# Patient Record
Sex: FEMALE | Race: WHITE | NOT HISPANIC OR LATINO | Employment: OTHER
[De-identification: names, ages, dates, MRNs, and addresses within clinical notes are randomized per-mention and may not be internally consistent; named-entity substitution may affect disease eponyms.]

---

## 2023-03-29 LAB
APPEARANCE, URINE: CLEAR
BILIRUBIN, URINE: NEGATIVE
BLOOD, URINE: NEGATIVE
COLOR, URINE: YELLOW
GLUCOSE, URINE: NEGATIVE MG/DL
KETONES, URINE: NEGATIVE MG/DL
LEUKOCYTE ESTERASE, URINE: ABNORMAL
MUCUS, URINE: NORMAL /LPF
NITRITE, URINE: NEGATIVE
PH, URINE: 5 (ref 5–8)
PROTEIN, URINE: NEGATIVE MG/DL
RBC, URINE: <1 /HPF (ref 0–5)
SPECIFIC GRAVITY, URINE: 1.01 (ref 1–1.03)
SQUAMOUS EPITHELIAL CELLS, URINE: 1 /HPF
TRANSITIONAL EPITHELIAL CELLS, URINE: <1 /HPF
UROBILINOGEN, URINE: <2 MG/DL (ref 0–1.9)
WBC, URINE: <1 /HPF (ref 0–5)

## 2023-03-31 LAB
URINE CULTURE: ABNORMAL
URINE CULTURE: ABNORMAL

## 2024-02-08 ENCOUNTER — TELEPHONE (OUTPATIENT)
Dept: SCHEDULING | Age: 74
End: 2024-02-08
Payer: MEDICARE

## 2024-02-08 ENCOUNTER — TELEPHONE (OUTPATIENT)
Dept: CARDIOLOGY | Facility: CLINIC | Age: 74
End: 2024-02-08
Payer: MEDICARE

## 2024-02-08 NOTE — TELEPHONE ENCOUNTER
Left a message on patient identified voice mail instructing her to seek medical attention as soon as possible for reported chest pain, tightness and nausea.  Suggested she may want to contact/follow up with MD who prescribed the nitroglycerin she took or prescriber of Crestor. Reiterated the urgency of seeking medical care for any type of chest pain. Encouraged her to call the office if she would like/needs a referral to general cardiology.

## 2024-02-09 ENCOUNTER — APPOINTMENT (OUTPATIENT)
Dept: CARDIOLOGY | Facility: HOSPITAL | Age: 74
End: 2024-02-09
Payer: MEDICARE

## 2024-02-09 ENCOUNTER — HOSPITAL ENCOUNTER (EMERGENCY)
Facility: HOSPITAL | Age: 74
Discharge: HOME | End: 2024-02-09
Attending: EMERGENCY MEDICINE
Payer: MEDICARE

## 2024-02-09 ENCOUNTER — APPOINTMENT (OUTPATIENT)
Dept: RADIOLOGY | Facility: HOSPITAL | Age: 74
End: 2024-02-09
Payer: MEDICARE

## 2024-02-09 VITALS
SYSTOLIC BLOOD PRESSURE: 157 MMHG | HEIGHT: 66 IN | WEIGHT: 153 LBS | HEART RATE: 68 BPM | RESPIRATION RATE: 20 BRPM | OXYGEN SATURATION: 98 % | TEMPERATURE: 98.3 F | BODY MASS INDEX: 24.59 KG/M2 | DIASTOLIC BLOOD PRESSURE: 74 MMHG

## 2024-02-09 DIAGNOSIS — R07.9 CHEST PAIN: Primary | ICD-10-CM

## 2024-02-09 DIAGNOSIS — R94.5 ABNORMAL LIVER FUNCTION: ICD-10-CM

## 2024-02-09 DIAGNOSIS — R07.9 CHEST PAIN, UNSPECIFIED TYPE: ICD-10-CM

## 2024-02-09 LAB
ALBUMIN SERPL BCP-MCNC: 4.3 G/DL (ref 3.4–5)
ALP SERPL-CCNC: 77 U/L (ref 33–136)
ALT SERPL W P-5'-P-CCNC: 192 U/L (ref 7–45)
ANION GAP SERPL CALC-SCNC: 12 MMOL/L (ref 10–20)
APAP SERPL-MCNC: <10 UG/ML
AST SERPL W P-5'-P-CCNC: 351 U/L (ref 9–39)
BASOPHILS # BLD AUTO: 0.04 X10*3/UL (ref 0–0.1)
BASOPHILS NFR BLD AUTO: 0.4 %
BILIRUB SERPL-MCNC: 0.9 MG/DL (ref 0–1.2)
BUN SERPL-MCNC: 14 MG/DL (ref 6–23)
CALCIUM SERPL-MCNC: 9.6 MG/DL (ref 8.6–10.3)
CARDIAC TROPONIN I PNL SERPL HS: 5 NG/L (ref 0–13)
CARDIAC TROPONIN I PNL SERPL HS: 5 NG/L (ref 0–13)
CHLORIDE SERPL-SCNC: 106 MMOL/L (ref 98–107)
CO2 SERPL-SCNC: 27 MMOL/L (ref 21–32)
CREAT SERPL-MCNC: 0.8 MG/DL (ref 0.5–1.05)
EGFRCR SERPLBLD CKD-EPI 2021: 77 ML/MIN/1.73M*2
EOSINOPHIL # BLD AUTO: 0.06 X10*3/UL (ref 0–0.4)
EOSINOPHIL NFR BLD AUTO: 0.5 %
ERYTHROCYTE [DISTWIDTH] IN BLOOD BY AUTOMATED COUNT: 13.5 % (ref 11.5–14.5)
FLUAV RNA RESP QL NAA+PROBE: NOT DETECTED
FLUBV RNA RESP QL NAA+PROBE: NOT DETECTED
GLUCOSE SERPL-MCNC: 99 MG/DL (ref 74–99)
HCT VFR BLD AUTO: 42.7 % (ref 36–46)
HGB BLD-MCNC: 14.5 G/DL (ref 12–16)
IMM GRANULOCYTES # BLD AUTO: 0.03 X10*3/UL (ref 0–0.5)
IMM GRANULOCYTES NFR BLD AUTO: 0.3 % (ref 0–0.9)
LIPASE SERPL-CCNC: 23 U/L (ref 9–82)
LYMPHOCYTES # BLD AUTO: 2.2 X10*3/UL (ref 0.8–3)
LYMPHOCYTES NFR BLD AUTO: 20 %
MCH RBC QN AUTO: 30.6 PG (ref 26–34)
MCHC RBC AUTO-ENTMCNC: 34 G/DL (ref 32–36)
MCV RBC AUTO: 90 FL (ref 80–100)
MONOCYTES # BLD AUTO: 0.86 X10*3/UL (ref 0.05–0.8)
MONOCYTES NFR BLD AUTO: 7.8 %
NEUTROPHILS # BLD AUTO: 7.81 X10*3/UL (ref 1.6–5.5)
NEUTROPHILS NFR BLD AUTO: 71 %
NRBC BLD-RTO: 0 /100 WBCS (ref 0–0)
PLATELET # BLD AUTO: 238 X10*3/UL (ref 150–450)
POTASSIUM SERPL-SCNC: 4 MMOL/L (ref 3.5–5.3)
PROT SERPL-MCNC: 7.4 G/DL (ref 6.4–8.2)
RBC # BLD AUTO: 4.74 X10*6/UL (ref 4–5.2)
SARS-COV-2 RNA RESP QL NAA+PROBE: NOT DETECTED
SODIUM SERPL-SCNC: 141 MMOL/L (ref 136–145)
WBC # BLD AUTO: 11 X10*3/UL (ref 4.4–11.3)

## 2024-02-09 PROCEDURE — 2500000001 HC RX 250 WO HCPCS SELF ADMINISTERED DRUGS (ALT 637 FOR MEDICARE OP): Performed by: EMERGENCY MEDICINE

## 2024-02-09 PROCEDURE — 76705 ECHO EXAM OF ABDOMEN: CPT | Mod: FOREIGN READ | Performed by: RADIOLOGY

## 2024-02-09 PROCEDURE — 99285 EMERGENCY DEPT VISIT HI MDM: CPT | Mod: 25 | Performed by: EMERGENCY MEDICINE

## 2024-02-09 PROCEDURE — 80053 COMPREHEN METABOLIC PANEL: CPT | Performed by: EMERGENCY MEDICINE

## 2024-02-09 PROCEDURE — 36415 COLL VENOUS BLD VENIPUNCTURE: CPT | Performed by: EMERGENCY MEDICINE

## 2024-02-09 PROCEDURE — 87636 SARSCOV2 & INF A&B AMP PRB: CPT | Performed by: EMERGENCY MEDICINE

## 2024-02-09 PROCEDURE — 2500000004 HC RX 250 GENERAL PHARMACY W/ HCPCS (ALT 636 FOR OP/ED): Performed by: STUDENT IN AN ORGANIZED HEALTH CARE EDUCATION/TRAINING PROGRAM

## 2024-02-09 PROCEDURE — 85025 COMPLETE CBC W/AUTO DIFF WBC: CPT | Performed by: EMERGENCY MEDICINE

## 2024-02-09 PROCEDURE — 84484 ASSAY OF TROPONIN QUANT: CPT | Performed by: EMERGENCY MEDICINE

## 2024-02-09 PROCEDURE — 71045 X-RAY EXAM CHEST 1 VIEW: CPT

## 2024-02-09 PROCEDURE — 76705 ECHO EXAM OF ABDOMEN: CPT

## 2024-02-09 PROCEDURE — 99285 EMERGENCY DEPT VISIT HI MDM: CPT | Mod: 25

## 2024-02-09 PROCEDURE — 93005 ELECTROCARDIOGRAM TRACING: CPT

## 2024-02-09 PROCEDURE — 71045 X-RAY EXAM CHEST 1 VIEW: CPT | Mod: FOREIGN READ | Performed by: RADIOLOGY

## 2024-02-09 PROCEDURE — 80143 DRUG ASSAY ACETAMINOPHEN: CPT | Performed by: EMERGENCY MEDICINE

## 2024-02-09 PROCEDURE — 83690 ASSAY OF LIPASE: CPT | Performed by: EMERGENCY MEDICINE

## 2024-02-09 RX ORDER — GUAIFENESIN 600 MG/1
600 TABLET, EXTENDED RELEASE ORAL EVERY 12 HOURS PRN
Status: DISCONTINUED | OUTPATIENT
Start: 2024-02-09 | End: 2024-02-09 | Stop reason: HOSPADM

## 2024-02-09 RX ORDER — ONDANSETRON HYDROCHLORIDE 2 MG/ML
4 INJECTION, SOLUTION INTRAVENOUS EVERY 8 HOURS PRN
Status: DISCONTINUED | OUTPATIENT
Start: 2024-02-09 | End: 2024-02-09 | Stop reason: HOSPADM

## 2024-02-09 RX ORDER — NAPROXEN SODIUM 220 MG/1
324 TABLET, FILM COATED ORAL ONCE
Status: COMPLETED | OUTPATIENT
Start: 2024-02-09 | End: 2024-02-09

## 2024-02-09 RX ORDER — PANTOPRAZOLE SODIUM 40 MG/1
40 TABLET, DELAYED RELEASE ORAL
Status: DISCONTINUED | OUTPATIENT
Start: 2024-02-10 | End: 2024-02-09 | Stop reason: HOSPADM

## 2024-02-09 RX ORDER — ENOXAPARIN SODIUM 100 MG/ML
40 INJECTION SUBCUTANEOUS EVERY 24 HOURS
Status: DISCONTINUED | OUTPATIENT
Start: 2024-02-09 | End: 2024-02-09 | Stop reason: HOSPADM

## 2024-02-09 RX ORDER — BISACODYL 5 MG
10 TABLET, DELAYED RELEASE (ENTERIC COATED) ORAL DAILY PRN
Status: DISCONTINUED | OUTPATIENT
Start: 2024-02-09 | End: 2024-02-09 | Stop reason: HOSPADM

## 2024-02-09 RX ORDER — ONDANSETRON 4 MG/1
4 TABLET, FILM COATED ORAL EVERY 8 HOURS PRN
Status: DISCONTINUED | OUTPATIENT
Start: 2024-02-09 | End: 2024-02-09 | Stop reason: HOSPADM

## 2024-02-09 RX ORDER — PANTOPRAZOLE SODIUM 40 MG/10ML
40 INJECTION, POWDER, LYOPHILIZED, FOR SOLUTION INTRAVENOUS
Status: DISCONTINUED | OUTPATIENT
Start: 2024-02-10 | End: 2024-02-09 | Stop reason: HOSPADM

## 2024-02-09 RX ORDER — POLYETHYLENE GLYCOL 3350 17 G/17G
17 POWDER, FOR SOLUTION ORAL DAILY
Status: DISCONTINUED | OUTPATIENT
Start: 2024-02-09 | End: 2024-02-09 | Stop reason: HOSPADM

## 2024-02-09 RX ORDER — BISACODYL 10 MG/1
10 SUPPOSITORY RECTAL DAILY PRN
Status: DISCONTINUED | OUTPATIENT
Start: 2024-02-09 | End: 2024-02-09 | Stop reason: HOSPADM

## 2024-02-09 RX ORDER — TALC
3 POWDER (GRAM) TOPICAL DAILY
Status: DISCONTINUED | OUTPATIENT
Start: 2024-02-09 | End: 2024-02-09 | Stop reason: HOSPADM

## 2024-02-09 RX ADMIN — POLYETHYLENE GLYCOL 3350 17 G: 17 POWDER, FOR SOLUTION ORAL at 18:13

## 2024-02-09 RX ADMIN — ASPIRIN 81 MG CHEWABLE TABLET 324 MG: 81 TABLET CHEWABLE at 16:04

## 2024-02-09 ASSESSMENT — ENCOUNTER SYMPTOMS
CHILLS: 0
ABDOMINAL PAIN: 0
COUGH: 0
SORE THROAT: 0
FEVER: 0
HEMATURIA: 0
PALPITATIONS: 1
ARTHRALGIAS: 0
VOMITING: 0
WHEEZING: 0
SEIZURES: 0
EYE PAIN: 0
BACK PAIN: 0
CHEST TIGHTNESS: 1
COLOR CHANGE: 0
DYSURIA: 0
SHORTNESS OF BREATH: 0
NAUSEA: 1

## 2024-02-09 ASSESSMENT — COLUMBIA-SUICIDE SEVERITY RATING SCALE - C-SSRS
1. IN THE PAST MONTH, HAVE YOU WISHED YOU WERE DEAD OR WISHED YOU COULD GO TO SLEEP AND NOT WAKE UP?: NO
2. HAVE YOU ACTUALLY HAD ANY THOUGHTS OF KILLING YOURSELF?: NO
6. HAVE YOU EVER DONE ANYTHING, STARTED TO DO ANYTHING, OR PREPARED TO DO ANYTHING TO END YOUR LIFE?: NO

## 2024-02-09 ASSESSMENT — PAIN - FUNCTIONAL ASSESSMENT: PAIN_FUNCTIONAL_ASSESSMENT: 0-10

## 2024-02-09 ASSESSMENT — LIFESTYLE VARIABLES
HAVE PEOPLE ANNOYED YOU BY CRITICIZING YOUR DRINKING: NO
HAVE YOU EVER FELT YOU SHOULD CUT DOWN ON YOUR DRINKING: NO
EVER FELT BAD OR GUILTY ABOUT YOUR DRINKING: NO

## 2024-02-09 NOTE — PROGRESS NOTES
Emergency Medicine Transition of Care Note.    I received Tracy Santoyo in signout from Dr. Palomares.  Please see the previous ED provider note for all HPI, PE and MDM up to the time of signout at 4:30 PM. This is in addition to the primary record.    In brief Tracy Santoyo is an 73 y.o. female presenting for   Chief Complaint   Patient presents with    Chest Pain     At the time of signout we were awaiting: Admission to the hospital    ED Course as of 02/09/24 1839 Fri Feb 09, 2024   1726 ECG: Sinus rhythm, rate 59, , QTc 436.  No ST elevation/depression meeting STEMI criteria. [BR]   1826 Patient signed to me pending ultrasound of the right upper quadrant and hospitalization ultrasound negative for any pathology sent for mild dilatation of pancreatic duct.  Patient troponin negative x 2.  On my assessment the patient is alert and awake O x 3 she is pain-free she wants be discharged home.  I had a long discussion with the patient she had decision making was made I told that in the absence of recent cardiac stress test okay tachycardic catheterization the possibility of unstable angina/unstable plaque cannot be ruled out and she is at high risk for MI/sudden death but patient still does not be hospitalized and she does not leave A either.  So we did a shared decision-making currently she is pain-free EKG is unremarkable troponin x 2 is negative she will see her cardiologist this coming Tuesday Dr. Turner in the interim if she gets any worse and her pain returns she will return to the ED immediately for evaluation.  Again the patient is leaving the ED she does not be hospitalized despite my attempts to convince her. [MT]      ED Course User Index  [BR] Navid Palomares MD  [MT] Crystal Powell MD         Diagnoses as of 02/09/24 1839   Chest pain   Chest pain, unspecified type   Abnormal liver function       Medical Decision Making  73-year-old white female history of no CAD in the past takes nitro at home  today came in after he had an episode of chest pain at home relieved nitroglycerin.  Currently pain-free.  Had EKG which was unremarkable and 2 negative troponins.  Labs concerning for transaminitis ultrasound upper quadrant showed some dilatation of the pinky duct otherwise negative ultrasound.  Patient is pain-free.  Patient was been hospitalized discussed with hospitalist.  Patient on my assessment is pain-free wants to go home I had a long discussion.  She is absolutely refusing to be hospitalized and does not want to leave AMA either.  She is competent we had a shared decision-making she does understand that she is high risk for a massive MI and/or sudden death but she still wants to go home and see her cardiologist Dr. Turner as scheduled next week.  In the interim I have urged that if he changes his mind and her pain returns return to the ED immediately for evaluation.  Currently she is pain-free EKG unremarkable stable vital signs and clinical troponins.    Final diagnoses:   [R07.9] Chest pain   [R07.9] Chest pain, unspecified type   [R94.5] Abnormal liver function           Procedure  Procedures    Crystal Powell MD

## 2024-02-09 NOTE — ED PROVIDER NOTES
HPI   Chief Complaint   Patient presents with    Chest Pain       HPI  Tracy Snatoyo is a 73 y.o. female who presents for chest pain. States that her chest pain started at noon today, rated 8/10 pain that does not radiate anywhere. She did have associated nausea at the start of her chest pain but no episodes of vomiting. Has had this in the past, once roughly a year ago around this time of year and then more recently Thursday night 2/08/24 at 2:45 am. Today she took two of her nitroglycerine pills and the chest pain eased.  Notes that she has been experiencing increased dyspnea with exertion over the last several days.  Notes similar symptoms approximately 1 year ago for which she was evaluated at [facility], workup at that time was reportedly largely unremarkable.  Patient denies any history of ACS/AMI personally.  Denies any history of PE or DVT.  Denies any pain with deep inspiration.  Denies any lower extremity edema or pain.  Denies any other recent illness or injury.                      [place] Coma Scale Score: 15                  No current facility-administered medications on file prior to encounter.     No current outpatient medications on file prior to encounter.        No Known Allergies    Review of Systems   Constitutional:  Negative for chills and fever.   HENT:  Negative for ear pain and sore throat.    Eyes:  Negative for pain and visual disturbance.   Respiratory:  Positive for chest tightness. Negative for cough, shortness of breath and wheezing.    Cardiovascular:  Positive for chest pain and palpitations.   Gastrointestinal:  Positive for nausea. Negative for abdominal pain and vomiting.   Genitourinary:  Negative for dysuria and hematuria.   Musculoskeletal:  Negative for arthralgias and back pain.   Skin:  Negative for color change and rash.   Neurological:  Negative for seizures and syncope.   All other systems reviewed and are negative.      ROS: Complete 12 point review of  systems performed, otherwise negative except as noted in the history of present illness    PMH: Reviewed, documented below in note. Pertinents in HPI  PSH: Reviewed and documented below in note. Pertinents in HPI  SH: No tobacco alcohol or illicits   Fam: Reviewed, noncontributory to patients current complaint  MEDS: Reviewed and documented below in note. Pertinents in HPI  ALLERGIES: Reviewed and documented below in note.    Patient History   Past Medical History:   Diagnosis Date    Encounter for fitting and adjustment of other specified devices 08/23/2022    Fitting and adjustment of pessary    Encounter for fitting and adjustment of other specified devices 12/02/2019    Fitting and adjustment of pessary    Personal history of other diseases of the nervous system and sense organs 01/15/2019    History of trigeminal neuralgia    Presence of urogenital implants 09/06/2022    Vaginal pessary present     Past Surgical History:   Procedure Laterality Date    OTHER SURGICAL HISTORY  01/15/2019    Abdominoplasty    OTHER SURGICAL HISTORY  01/15/2019    Cholecystectomy    OTHER SURGICAL HISTORY  01/15/2019    Urethropexy    OTHER SURGICAL HISTORY  01/15/2019    Hysterectomy    OTHER SURGICAL HISTORY  01/15/2019    Liver surgery    OTHER SURGICAL HISTORY  01/15/2019    Sinus surgery    OTHER SURGICAL HISTORY  01/15/2019    Craniotomy    OTHER SURGICAL HISTORY  10/18/2022    Sacrospinous fixation of vaginal vault     No family history on file.  Social History     Tobacco Use    Smoking status: Not on file    Smokeless tobacco: Not on file   Substance Use Topics    Alcohol use: Not on file    Drug use: Not on file       Physical Exam   ED Triage Vitals   Temperature Heart Rate Respirations BP   02/09/24 1300 02/09/24 1300 02/09/24 1300 02/09/24 1300   36.8 °C (98.3 °F) 60 20 164/71      Pulse Ox Temp src Heart Rate Source Patient Position   02/09/24 1300 -- -- 02/09/24 1431   98 %   Sitting      BP Location FiO2 (%)      02/09/24 1431 --     Left arm        Physical Exam  Vitals and nursing note reviewed.   Constitutional:       General: She is not in acute distress.     Appearance: She is well-developed.   HENT:      Head: Normocephalic and atraumatic.   Eyes:      Conjunctiva/sclera: Conjunctivae normal.   Cardiovascular:      Rate and Rhythm: Normal rate and regular rhythm.      Pulses:           Radial pulses are 2+ on the right side and 2+ on the left side.      Heart sounds: No murmur heard.  Pulmonary:      Effort: Pulmonary effort is normal. No tachypnea, bradypnea or respiratory distress.      Breath sounds: Normal breath sounds. No decreased breath sounds, wheezing, rhonchi or rales.   Chest:      Chest wall: No tenderness.   Abdominal:      Palpations: Abdomen is soft.      Tenderness: There is no abdominal tenderness.   Musculoskeletal:         General: No swelling.      Cervical back: Neck supple.      Right lower leg: No edema.      Left lower leg: No edema.   Skin:     General: Skin is warm and dry.      Capillary Refill: Capillary refill takes less than 2 seconds.   Neurological:      Mental Status: She is alert and oriented to person, place, and time.      GCS: GCS eye subscore is 4. GCS verbal subscore is 5. GCS motor subscore is 6.      Cranial Nerves: Cranial nerves 2-12 are intact.      Sensory: Sensation is intact.      Motor: Motor function is intact.      Deep Tendon Reflexes:      Reflex Scores:       Patellar reflexes are 2+ on the right side and 2+ on the left side.  Psychiatric:         Mood and Affect: Mood normal.       Labs Reviewed - No data to display  Pain Management Panel           No data to display              No orders to display     ED Course & MDM   ED Course as of 02/09/24 1727   Fri Feb 09, 2024   1726 ECG: Sinus rhythm, rate 59, , QTc 436.  No ST elevation/depression meeting STEMI criteria. [BR]      ED Course User Index  [BR] Navid Palomares MD           Medical Decision  Making  Patient presenting with dull substernal chest pain that improved with nitroglycerin, patient currently pain-free at time of ED evaluation.  ECG showing no ischemic changes.  Patient has an otherwise benign cardiopulmonary, abdominal examination.  Demonstrating normal range vital signs at time of assessment, in no acute distress.  Given substernal chest pain radiating upwards with improvement with nitroglycerin, does raise concern for cardiac source of chest pain, will also assess for intrathoracic process such as pneumonia, pneumothorax, mass with chest x-ray, will obtain CMP, lipase to assess for hepatobiliary/epigastric pathology although patient does not have any abdominal tenderness and seems to localize most of this to her chest.    Patient's troponin unremarkable, chest x-ray clear, LFTs were mildly elevated, did order acetaminophen level which was undetectably low and on reassessment patient denies utilizing any acetaminophen containing products.  Is status post cholecystectomy.  Did order right upper quadrant ultrasound to assess for structural liver pathology as well.  Given patient's chest pain resolving with nitroglycerin and with her elevated heart score (5) plan to admit for further cardiac evaluation, patient signed out pending right upper quadrant ultrasound results, discussion with hospitalist.  Patient signed out in stable condition.        Procedure  Procedures          The diagnosis and plan of care was also discussed with the patient. All questions were answered. The patient was receptive and agreeable to the plan of care. The patient was instructed to return to the emergency department if any symptoms recurred, worsened, or if there were any additional concerns.    *Disclaimer: This note was dictated by speech recognition. Minor errors in transcription may be present. Please call with questions.    Aleksey Mcmahan, [facility]  Medical student on Emergency Medicine service       Navid NAVARRO  MD Jelani  02/09/24 9806

## 2024-02-12 ENCOUNTER — OFFICE VISIT (OUTPATIENT)
Dept: CARDIOLOGY | Facility: CLINIC | Age: 74
End: 2024-02-12
Payer: MEDICARE

## 2024-02-12 VITALS
BODY MASS INDEX: 25.07 KG/M2 | WEIGHT: 156 LBS | HEART RATE: 66 BPM | TEMPERATURE: 98 F | HEIGHT: 66 IN | DIASTOLIC BLOOD PRESSURE: 70 MMHG | SYSTOLIC BLOOD PRESSURE: 135 MMHG

## 2024-02-12 DIAGNOSIS — R07.9 CHEST PAIN, UNSPECIFIED TYPE: Primary | ICD-10-CM

## 2024-02-12 DIAGNOSIS — R93.1 AGATSTON CORONARY ARTERY CALCIUM SCORE BETWEEN 200 AND 399: ICD-10-CM

## 2024-02-12 PROBLEM — R07.89 OTHER CHEST PAIN: Status: ACTIVE | Noted: 2024-02-12

## 2024-02-12 PROBLEM — R07.89 OTHER CHEST PAIN: Status: RESOLVED | Noted: 2024-02-12 | Resolved: 2024-02-12

## 2024-02-12 LAB
ATRIAL RATE: 58 BPM
P AXIS: 54 DEGREES
PR INTERVAL: 168 MS
Q ONSET: 251 MS
QRS COUNT: 10 BEATS
QRS DURATION: 101 MS
QT INTERVAL: 440 MS
QTC CALCULATION(BAZETT): 436 MS
QTC FREDERICIA: 437 MS
R AXIS: 46 DEGREES
T AXIS: 42 DEGREES
T OFFSET: 471 MS
VENTRICULAR RATE: 59 BPM

## 2024-02-12 PROCEDURE — 1160F RVW MEDS BY RX/DR IN RCRD: CPT | Performed by: INTERNAL MEDICINE

## 2024-02-12 PROCEDURE — 99214 OFFICE O/P EST MOD 30 MIN: CPT | Performed by: INTERNAL MEDICINE

## 2024-02-12 PROCEDURE — 1036F TOBACCO NON-USER: CPT | Performed by: INTERNAL MEDICINE

## 2024-02-12 PROCEDURE — 1126F AMNT PAIN NOTED NONE PRSNT: CPT | Performed by: INTERNAL MEDICINE

## 2024-02-12 PROCEDURE — 1157F ADVNC CARE PLAN IN RCRD: CPT | Performed by: INTERNAL MEDICINE

## 2024-02-12 PROCEDURE — 1159F MED LIST DOCD IN RCRD: CPT | Performed by: INTERNAL MEDICINE

## 2024-02-12 RX ORDER — NITROGLYCERIN 0.3 MG/1
0.3 TABLET SUBLINGUAL
COMMUNITY
Start: 2023-03-13

## 2024-02-12 RX ORDER — MULTIVITAMIN
1 TABLET ORAL DAILY
COMMUNITY
Start: 2022-01-26

## 2024-02-12 RX ORDER — MAGNESIUM 250 MG
250 TABLET ORAL 2 TIMES DAILY
COMMUNITY

## 2024-02-12 RX ORDER — ROSUVASTATIN CALCIUM 5 MG/1
1 TABLET, COATED ORAL DAILY
COMMUNITY
End: 2024-02-12 | Stop reason: SINTOL

## 2024-02-12 RX ORDER — OMEGA-3-ACID ETHYL ESTERS 1 G/1
1 CAPSULE, LIQUID FILLED ORAL 2 TIMES DAILY
COMMUNITY

## 2024-02-12 RX ORDER — DIMETHICONE 13 MG/ML
1 LOTION TOPICAL DAILY
Status: ON HOLD | COMMUNITY
Start: 2022-07-27 | End: 2024-03-15 | Stop reason: ALTCHOICE

## 2024-02-12 RX ORDER — DILTIAZEM HYDROCHLORIDE 240 MG/1
240 CAPSULE, COATED, EXTENDED RELEASE ORAL
COMMUNITY
Start: 2023-05-24 | End: 2024-05-16 | Stop reason: SDUPTHER

## 2024-02-12 RX ORDER — NITROFURANTOIN MACROCRYSTALS 50 MG/1
50 CAPSULE ORAL
COMMUNITY
Start: 2022-08-23

## 2024-02-12 RX ORDER — ESTRADIOL 0.1 MG/G
CREAM VAGINAL
COMMUNITY
Start: 2019-11-27

## 2024-02-12 RX ORDER — UBIDECARENONE 75 MG
CAPSULE ORAL
COMMUNITY
Start: 2022-07-27

## 2024-02-12 RX ORDER — ASPIRIN 325 MG
1 TABLET, DELAYED RELEASE (ENTERIC COATED) ORAL 2 TIMES DAILY
COMMUNITY
Start: 2022-07-27

## 2024-02-12 RX ORDER — PANTOPRAZOLE SODIUM 40 MG/1
40 TABLET, DELAYED RELEASE ORAL
Qty: 30 TABLET | Refills: 11 | Status: ON HOLD
Start: 2024-02-12 | End: 2024-03-15 | Stop reason: ALTCHOICE

## 2024-02-12 RX ORDER — POLYETHYLENE GLYCOL 3350 17 G/17G
POWDER, FOR SOLUTION ORAL
COMMUNITY
Start: 2022-01-26

## 2024-02-12 RX ORDER — LEVOTHYROXINE SODIUM 125 UG/1
125 TABLET ORAL
COMMUNITY
Start: 2023-08-21

## 2024-02-12 RX ORDER — MELATON/THEAN/VAL/LEM/CHAM/LAV 10MG-200MG
TABLET,IMMED, EXTENDED RELEASE, BIPHASIC ORAL
Status: ON HOLD | COMMUNITY
Start: 2022-07-27 | End: 2024-03-15 | Stop reason: ALTCHOICE

## 2024-02-12 RX ORDER — IBUPROFEN 600 MG/1
TABLET ORAL
COMMUNITY
Start: 2022-10-07

## 2024-02-12 NOTE — PROGRESS NOTES
Chief Complaint:   Chest Pain     History of Present Illness     Tracy Santoyo is a 73 y.o. 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   The patient is tolerating guideline-directed medical therapy with statin medication and is compliant.  The patient exercises regularly and follows a heart healthy diet.  The patient was in the ER 2/9/14 for SSCP (pressure) that awoke her from sleep non-radiating with nausea and no SOB lasted 15-20 mins relieved with SL NTG x 2. Discharged from ED. No further CP.    Review of Systems  All pertinent systems have been reviewed and are negative except for what is stated in the history of present illness.    All other systems have been reviewed and are negative and noncontributory to this patient's current ailments.   .       Previous History     Past Medical History:  She has a past medical history of Agatston coronary artery calcium score between 200 and 399 (02/12/2024), Encounter for fitting and adjustment of other specified devices (08/23/2022), Encounter for fitting and adjustment of other specified devices (12/02/2019), Other chest pain (02/12/2024), Personal history of other diseases of the nervous system and sense organs (01/15/2019), and Presence of urogenital implants (09/06/2022).    Past Surgical History:  She has a past surgical history that includes Other surgical history (01/15/2019); Other surgical history (01/15/2019); Other surgical history (01/15/2019); Other surgical history (01/15/2019); Other surgical history (01/15/2019); Other surgical history (01/15/2019); Other surgical history (01/15/2019); and Other surgical history (10/18/2022).      Social History:  She reports that she has never smoked. She has never used smokeless tobacco. No history on file for alcohol use and drug use.    Family History:  No family history on file.     Allergies:  Adhesive tape-silicones    Outpatient  "Medications:  Current Outpatient Medications   Medication Instructions    co-enzyme Q-10 50 mg capsule 1 capsule, oral, 2 times daily    cranberry extract 425 mg capsule 1 capsule, oral, Daily    cyanocobalamin (Vitamin B-12) 500 mcg tablet oral    dilTIAZem CD (CARDIZEM CD) 240 mg, oral, Daily RT    estradiol (Estrace) 0.01 % (0.1 mg/gram) vaginal cream vaginal    EVENING PRIMROSE OIL ORAL oral    folic acid/vit B complex and C (B complex-vitamin C-folic acid) 400 mcg tablet extended release oral    ibuprofen 600 mg tablet oral    levothyroxine (SYNTHROID, LEVOXYL) 125 mcg, oral, Daily RT    magnesium 250 mg, oral, 2 times daily    multivitamin tablet 1 tablet, oral, Daily    nitrofurantoin (MACRODANTIN) 50 mg, Enteral    nitroglycerin (NITROSTAT) 0.3 mg, sublingual    omega-3 acid ethyl esters (LOVAZA) 1 g, oral, 2 times daily    polyethylene glycol (Miralax) 17 gram/dose powder oral       Physical Examination   Vitals:  Visit Vitals  /70 (BP Location: Right arm)   Pulse 66   Temp 36.7 °C (98 °F)   Ht 1.676 m (5' 6\")   Wt 70.8 kg (156 lb)   BMI 25.18 kg/m²   Smoking Status Never   BSA 1.82 m²      Physical Exam  Vitals reviewed.   Constitutional:       General: She is not in acute distress.     Appearance: Normal appearance.   HENT:      Head: Normocephalic and atraumatic.      Nose: Nose normal.   Eyes:      Conjunctiva/sclera: Conjunctivae normal.   Cardiovascular:      Rate and Rhythm: Normal rate and regular rhythm.      Pulses: Normal pulses.      Heart sounds: No murmur heard.  Pulmonary:      Effort: Pulmonary effort is normal. No respiratory distress.      Breath sounds: Normal breath sounds. No wheezing, rhonchi or rales.   Abdominal:      General: Bowel sounds are normal. There is no distension.      Palpations: Abdomen is soft.      Tenderness: There is no abdominal tenderness.   Musculoskeletal:         General: No swelling.      Right lower leg: No edema.      Left lower leg: No edema.   Skin:   " "  General: Skin is warm and dry.      Capillary Refill: Capillary refill takes less than 2 seconds.   Neurological:      General: No focal deficit present.      Mental Status: She is alert.   Psychiatric:         Mood and Affect: Mood normal.            Labs/Imaging/Cardiac Studies     Last Labs:  CBC -  Lab Results   Component Value Date    WBC 11.0 02/09/2024    HGB 14.5 02/09/2024    HCT 42.7 02/09/2024    MCV 90 02/09/2024     02/09/2024       CMP -  Lab Results   Component Value Date    CALCIUM 9.6 02/09/2024    PROT 7.4 02/09/2024    ALBUMIN 4.3 02/09/2024     (H) 02/09/2024     (H) 02/09/2024    ALKPHOS 77 02/09/2024    BILITOT 0.9 02/09/2024       LIPID PANEL -   Lab Results   Component Value Date    CHOL 222 (H) 11/01/2022    HDL 48.8 11/01/2022    CHHDL 4.5 11/01/2022    VLDL 27 11/01/2022    TRIG 135 11/01/2022    NHDL 173 07/29/2021       RENAL FUNCTION PANEL -   Lab Results   Component Value Date    K 4.0 02/09/2024       No results found for: \"BNP\", \"HGBA1C\"    ECG:    Echo:  No echocardiogram results found for the past 12 months       Assessment and Recommendations     Assessment/Plan   1. Chest pain, unspecified type  Chest pain: The patient presents with atypical anginal chest pain.  The ECG is non-ischemic.  ER no ACS.  The differential diagnosis includes CAD, gastrointestinal, pulmonary, and musculoskeletal etiologies.  There is no clinical evidence to suggest acute coronary syndrome, aortic dissection, or pulmonary embolism.  The ECG shows no evidence of ischemia.  An outpatient evaluation of the patient's chest pain is appropriate with a stress test which will be scheduled as soon as can be arranged. For severe and/or prolonged chest pain, the patient was instructed to call 911.  Start PPI for possible esophageal spasm and schedule CCTA.  Continue Cardizem.  IF CCTA negative then referral to GI.  Start ASA 81 every day.      2. Agatston coronary artery calcium score between " 200 and 399  Hold statin for elevated LFTs.       Wood Turner MD    Exclusive of any other services or procedures performed, I, Wood Turner MD , spent 30 minutes in duration for this visit today.  This time consisted of chart review, obtaining history, and/or performing the exam as documented above as well as documenting the clinical information for the encounter in the electronic record, discussing treatment options, plans, and/or goals with patient, family, and/or caregiver, refilling medications, updating the electronic record, ordering medicines, lab work, imaging, referrals, and/or procedures as documented above and communicating with other [facility] professionals. I have discussed the results of laboratory, radiology, and cardiology studies with the patient and their family/caregiver.

## 2024-02-14 ENCOUNTER — APPOINTMENT (OUTPATIENT)
Dept: CARDIOLOGY | Facility: CLINIC | Age: 74
End: 2024-02-14
Payer: MEDICARE

## 2024-02-28 ENCOUNTER — HOSPITAL ENCOUNTER (OUTPATIENT)
Dept: RADIOLOGY | Facility: HOSPITAL | Age: 74
Discharge: HOME | End: 2024-02-28
Payer: MEDICARE

## 2024-02-28 VITALS
OXYGEN SATURATION: 98 % | TEMPERATURE: 97.4 F | WEIGHT: 153 LBS | RESPIRATION RATE: 16 BRPM | BODY MASS INDEX: 24.59 KG/M2 | HEIGHT: 66 IN | DIASTOLIC BLOOD PRESSURE: 64 MMHG | SYSTOLIC BLOOD PRESSURE: 120 MMHG | HEART RATE: 56 BPM

## 2024-02-28 DIAGNOSIS — R07.9 CHEST PAIN, UNSPECIFIED TYPE: ICD-10-CM

## 2024-02-28 DIAGNOSIS — R93.1 AGATSTON CORONARY ARTERY CALCIUM SCORE BETWEEN 200 AND 399: ICD-10-CM

## 2024-02-28 DIAGNOSIS — R93.1 ABNORMAL FINDINGS ON DIAGNOSTIC IMAGING OF HEART AND CORONARY CIRCULATION: ICD-10-CM

## 2024-02-28 PROCEDURE — 75574 CT ANGIO HRT W/3D IMAGE: CPT | Performed by: RADIOLOGY

## 2024-02-28 PROCEDURE — 2500000005 HC RX 250 GENERAL PHARMACY W/O HCPCS: Performed by: RADIOLOGY

## 2024-02-28 PROCEDURE — 75580 N-INVAS EST C FFR SW ALY CTA: CPT | Performed by: RADIOLOGY

## 2024-02-28 PROCEDURE — 2550000001 HC RX 255 CONTRASTS: Performed by: INTERNAL MEDICINE

## 2024-02-28 PROCEDURE — 75574 CT ANGIO HRT W/3D IMAGE: CPT

## 2024-02-28 PROCEDURE — 2500000001 HC RX 250 WO HCPCS SELF ADMINISTERED DRUGS (ALT 637 FOR MEDICARE OP): Performed by: RADIOLOGY

## 2024-02-28 PROCEDURE — 75580 N-INVAS EST C FFR SW ALY CTA: CPT

## 2024-02-28 RX ORDER — METOPROLOL TARTRATE 1 MG/ML
5 INJECTION, SOLUTION INTRAVENOUS ONCE AS NEEDED
Status: DISCONTINUED | OUTPATIENT
Start: 2024-02-28 | End: 2024-02-29 | Stop reason: HOSPADM

## 2024-02-28 RX ORDER — METOPROLOL TARTRATE 1 MG/ML
5 INJECTION, SOLUTION INTRAVENOUS ONCE
Status: COMPLETED | OUTPATIENT
Start: 2024-02-28 | End: 2024-02-28

## 2024-02-28 RX ORDER — METOPROLOL TARTRATE 50 MG/1
100 TABLET ORAL ONCE AS NEEDED
Status: DISCONTINUED | OUTPATIENT
Start: 2024-02-28 | End: 2024-02-29 | Stop reason: HOSPADM

## 2024-02-28 RX ORDER — ASPIRIN 81 MG/1
81 TABLET ORAL DAILY
COMMUNITY

## 2024-02-28 RX ORDER — NITROGLYCERIN 0.4 MG/1
0.8 TABLET SUBLINGUAL ONCE
Status: COMPLETED | OUTPATIENT
Start: 2024-02-28 | End: 2024-02-28

## 2024-02-28 RX ORDER — LORAZEPAM 2 MG/ML
0.5 INJECTION INTRAMUSCULAR EVERY 5 MIN PRN
Status: DISCONTINUED | OUTPATIENT
Start: 2024-02-28 | End: 2024-02-29 | Stop reason: HOSPADM

## 2024-02-28 RX ORDER — METOPROLOL TARTRATE 50 MG/1
100 TABLET ORAL ONCE
Status: DISCONTINUED | OUTPATIENT
Start: 2024-02-28 | End: 2024-02-29 | Stop reason: HOSPADM

## 2024-02-28 RX ADMIN — METOPROLOL TARTRATE 5 MG: 5 INJECTION, SOLUTION INTRAVENOUS at 13:39

## 2024-02-28 RX ADMIN — NITROGLYCERIN 0.8 MG: 0.4 TABLET SUBLINGUAL at 13:54

## 2024-02-28 RX ADMIN — IOHEXOL 70 ML: 350 INJECTION, SOLUTION INTRAVENOUS at 13:52

## 2024-02-28 ASSESSMENT — PAIN - FUNCTIONAL ASSESSMENT: PAIN_FUNCTIONAL_ASSESSMENT: 0-10

## 2024-02-28 ASSESSMENT — PAIN SCALES - GENERAL: PAINLEVEL_OUTOF10: 0 - NO PAIN

## 2024-02-29 ENCOUNTER — TELEPHONE (OUTPATIENT)
Dept: CARDIOLOGY | Facility: CLINIC | Age: 74
End: 2024-02-29
Payer: MEDICARE

## 2024-02-29 NOTE — TELEPHONE ENCOUNTER
----- Message from Wood Turner MD sent at 2/29/2024  8:28 AM EST -----  Coronary CTA severe LAD.  Arrange [facility].  Start ASA 81 every day. No exercise.  ----- Message -----  From: Interface, Radiology Results In  Sent: 2/28/2024   4:15 PM EST  To: Wood Turner MD

## 2024-03-05 DIAGNOSIS — I25.118 CORONARY ARTERY DISEASE OF NATIVE ARTERY OF NATIVE HEART WITH STABLE ANGINA PECTORIS (CMS-HCC): Primary | ICD-10-CM

## 2024-03-15 ENCOUNTER — HOSPITAL ENCOUNTER (OUTPATIENT)
Facility: HOSPITAL | Age: 74
Setting detail: OUTPATIENT SURGERY
Discharge: HOME | End: 2024-03-15
Attending: INTERNAL MEDICINE | Admitting: INTERNAL MEDICINE
Payer: MEDICARE

## 2024-03-15 VITALS
OXYGEN SATURATION: 98 % | WEIGHT: 153 LBS | HEART RATE: 62 BPM | TEMPERATURE: 97.7 F | BODY MASS INDEX: 24.69 KG/M2 | RESPIRATION RATE: 14 BRPM | SYSTOLIC BLOOD PRESSURE: 144 MMHG | DIASTOLIC BLOOD PRESSURE: 65 MMHG

## 2024-03-15 DIAGNOSIS — I25.10 ATHEROSCLEROTIC HEART DISEASE OF NATIVE CORONARY ARTERY WITHOUT ANGINA PECTORIS: ICD-10-CM

## 2024-03-15 DIAGNOSIS — R07.9 CHEST PAIN: ICD-10-CM

## 2024-03-15 DIAGNOSIS — R94.30 ABNORMAL RESULT OF CARDIOVASCULAR FUNCTION STUDY, UNSPECIFIED: ICD-10-CM

## 2024-03-15 DIAGNOSIS — I25.118 CORONARY ARTERY DISEASE OF NATIVE ARTERY OF NATIVE HEART WITH STABLE ANGINA PECTORIS (CMS-HCC): Primary | ICD-10-CM

## 2024-03-15 LAB
ALBUMIN SERPL BCP-MCNC: 3.8 G/DL (ref 3.4–5)
ALP SERPL-CCNC: 63 U/L (ref 33–136)
ALT SERPL W P-5'-P-CCNC: 14 U/L (ref 7–45)
ANION GAP SERPL CALC-SCNC: 14 MMOL/L (ref 10–20)
ANION GAP SERPL CALC-SCNC: 15 MMOL/L (ref 10–20)
AST SERPL W P-5'-P-CCNC: 14 U/L (ref 9–39)
BILIRUB SERPL-MCNC: 0.7 MG/DL (ref 0–1.2)
BUN SERPL-MCNC: 11 MG/DL (ref 6–23)
BUN SERPL-MCNC: 12 MG/DL (ref 6–23)
CALCIUM SERPL-MCNC: 8.6 MG/DL (ref 8.6–10.3)
CALCIUM SERPL-MCNC: 9.4 MG/DL (ref 8.6–10.3)
CHLORIDE SERPL-SCNC: 104 MMOL/L (ref 98–107)
CHLORIDE SERPL-SCNC: 106 MMOL/L (ref 98–107)
CO2 SERPL-SCNC: 23 MMOL/L (ref 21–32)
CO2 SERPL-SCNC: 27 MMOL/L (ref 21–32)
CREAT SERPL-MCNC: 0.65 MG/DL (ref 0.5–1.05)
CREAT SERPL-MCNC: 0.83 MG/DL (ref 0.5–1.05)
EGFRCR SERPLBLD CKD-EPI 2021: 74 ML/MIN/1.73M*2
EGFRCR SERPLBLD CKD-EPI 2021: >90 ML/MIN/1.73M*2
ERYTHROCYTE [DISTWIDTH] IN BLOOD BY AUTOMATED COUNT: 13.6 % (ref 11.5–14.5)
GLUCOSE SERPL-MCNC: 96 MG/DL (ref 74–99)
GLUCOSE SERPL-MCNC: 99 MG/DL (ref 74–99)
HCT VFR BLD AUTO: 41.9 % (ref 36–46)
HGB BLD-MCNC: 14.5 G/DL (ref 12–16)
MCH RBC QN AUTO: 31.3 PG (ref 26–34)
MCHC RBC AUTO-ENTMCNC: 34.6 G/DL (ref 32–36)
MCV RBC AUTO: 91 FL (ref 80–100)
NRBC BLD-RTO: 0 /100 WBCS (ref 0–0)
PLATELET # BLD AUTO: 276 X10*3/UL (ref 150–450)
POTASSIUM SERPL-SCNC: 3.6 MMOL/L (ref 3.5–5.3)
POTASSIUM SERPL-SCNC: 3.8 MMOL/L (ref 3.5–5.3)
PROT SERPL-MCNC: 6.1 G/DL (ref 6.4–8.2)
RBC # BLD AUTO: 4.63 X10*6/UL (ref 4–5.2)
SODIUM SERPL-SCNC: 140 MMOL/L (ref 136–145)
SODIUM SERPL-SCNC: 141 MMOL/L (ref 136–145)
WBC # BLD AUTO: 6.4 X10*3/UL (ref 4.4–11.3)

## 2024-03-15 PROCEDURE — 93458 L HRT ARTERY/VENTRICLE ANGIO: CPT | Performed by: INTERNAL MEDICINE

## 2024-03-15 PROCEDURE — 80053 COMPREHEN METABOLIC PANEL: CPT | Performed by: NURSE PRACTITIONER

## 2024-03-15 PROCEDURE — C1769 GUIDE WIRE: HCPCS | Performed by: INTERNAL MEDICINE

## 2024-03-15 PROCEDURE — 2550000001 HC RX 255 CONTRASTS: Performed by: INTERNAL MEDICINE

## 2024-03-15 PROCEDURE — 7100000010 HC PHASE TWO TIME - EACH INCREMENTAL 1 MINUTE: Performed by: INTERNAL MEDICINE

## 2024-03-15 PROCEDURE — 99222 1ST HOSP IP/OBS MODERATE 55: CPT | Performed by: NURSE PRACTITIONER

## 2024-03-15 PROCEDURE — 80048 BASIC METABOLIC PNL TOTAL CA: CPT | Mod: CCI | Performed by: NURSE PRACTITIONER

## 2024-03-15 PROCEDURE — 2500000004 HC RX 250 GENERAL PHARMACY W/ HCPCS (ALT 636 FOR OP/ED): Performed by: INTERNAL MEDICINE

## 2024-03-15 PROCEDURE — 36415 COLL VENOUS BLD VENIPUNCTURE: CPT | Performed by: NURSE PRACTITIONER

## 2024-03-15 PROCEDURE — 99153 MOD SED SAME PHYS/QHP EA: CPT | Performed by: INTERNAL MEDICINE

## 2024-03-15 PROCEDURE — 2500000005 HC RX 250 GENERAL PHARMACY W/O HCPCS: Performed by: INTERNAL MEDICINE

## 2024-03-15 PROCEDURE — 2720000007 HC OR 272 NO HCPCS: Performed by: INTERNAL MEDICINE

## 2024-03-15 PROCEDURE — 99152 MOD SED SAME PHYS/QHP 5/>YRS: CPT | Performed by: INTERNAL MEDICINE

## 2024-03-15 PROCEDURE — 2500000004 HC RX 250 GENERAL PHARMACY W/ HCPCS (ALT 636 FOR OP/ED): Performed by: NURSE PRACTITIONER

## 2024-03-15 PROCEDURE — C1894 INTRO/SHEATH, NON-LASER: HCPCS | Performed by: INTERNAL MEDICINE

## 2024-03-15 PROCEDURE — C1760 CLOSURE DEV, VASC: HCPCS | Performed by: INTERNAL MEDICINE

## 2024-03-15 PROCEDURE — 85027 COMPLETE CBC AUTOMATED: CPT | Performed by: NURSE PRACTITIONER

## 2024-03-15 PROCEDURE — 7100000009 HC PHASE TWO TIME - INITIAL BASE CHARGE: Performed by: INTERNAL MEDICINE

## 2024-03-15 RX ORDER — ACETAMINOPHEN 650 MG/1
650 SUPPOSITORY RECTAL EVERY 6 HOURS PRN
Status: DISCONTINUED | OUTPATIENT
Start: 2024-03-15 | End: 2024-03-18 | Stop reason: HOSPADM

## 2024-03-15 RX ORDER — VERAPAMIL HYDROCHLORIDE 2.5 MG/ML
INJECTION, SOLUTION INTRAVENOUS AS NEEDED
Status: DISCONTINUED | OUTPATIENT
Start: 2024-03-15 | End: 2024-03-15 | Stop reason: HOSPADM

## 2024-03-15 RX ORDER — LIDOCAINE HYDROCHLORIDE 10 MG/ML
INJECTION, SOLUTION EPIDURAL; INFILTRATION; INTRACAUDAL; PERINEURAL AS NEEDED
Status: DISCONTINUED | OUTPATIENT
Start: 2024-03-15 | End: 2024-03-15 | Stop reason: HOSPADM

## 2024-03-15 RX ORDER — HEPARIN SODIUM 1000 [USP'U]/ML
INJECTION, SOLUTION INTRAVENOUS; SUBCUTANEOUS AS NEEDED
Status: DISCONTINUED | OUTPATIENT
Start: 2024-03-15 | End: 2024-03-15 | Stop reason: HOSPADM

## 2024-03-15 RX ORDER — ISOSORBIDE MONONITRATE 30 MG/1
30 TABLET, EXTENDED RELEASE ORAL DAILY
Qty: 30 TABLET | Refills: 1 | Status: SHIPPED | OUTPATIENT
Start: 2024-03-15 | End: 2024-04-02 | Stop reason: SDUPTHER

## 2024-03-15 RX ORDER — ACETAMINOPHEN 325 MG/1
650 TABLET ORAL EVERY 6 HOURS PRN
Status: DISCONTINUED | OUTPATIENT
Start: 2024-03-15 | End: 2024-03-18 | Stop reason: HOSPADM

## 2024-03-15 RX ORDER — SODIUM CHLORIDE 9 MG/ML
100 INJECTION, SOLUTION INTRAVENOUS CONTINUOUS
Status: DISCONTINUED | OUTPATIENT
Start: 2024-03-15 | End: 2024-03-15

## 2024-03-15 RX ORDER — FENTANYL CITRATE 50 UG/ML
INJECTION, SOLUTION INTRAMUSCULAR; INTRAVENOUS AS NEEDED
Status: DISCONTINUED | OUTPATIENT
Start: 2024-03-15 | End: 2024-03-15 | Stop reason: HOSPADM

## 2024-03-15 RX ORDER — ACETAMINOPHEN 160 MG/5ML
650 SOLUTION ORAL EVERY 6 HOURS PRN
Status: DISCONTINUED | OUTPATIENT
Start: 2024-03-15 | End: 2024-03-18 | Stop reason: HOSPADM

## 2024-03-15 RX ORDER — SODIUM CHLORIDE 9 MG/ML
1.5 INJECTION, SOLUTION INTRAVENOUS CONTINUOUS
Status: DISCONTINUED | OUTPATIENT
Start: 2024-03-15 | End: 2024-03-15 | Stop reason: HOSPADM

## 2024-03-15 RX ORDER — RANOLAZINE 500 MG/1
500 TABLET, EXTENDED RELEASE ORAL 2 TIMES DAILY
Qty: 60 TABLET | Refills: 1 | Status: SHIPPED | OUTPATIENT
Start: 2024-03-15 | End: 2024-03-15 | Stop reason: HOSPADM

## 2024-03-15 RX ORDER — MIDAZOLAM HYDROCHLORIDE 1 MG/ML
INJECTION, SOLUTION INTRAMUSCULAR; INTRAVENOUS AS NEEDED
Status: DISCONTINUED | OUTPATIENT
Start: 2024-03-15 | End: 2024-03-15 | Stop reason: HOSPADM

## 2024-03-15 RX ADMIN — SODIUM CHLORIDE 100 ML/HR: 9 INJECTION, SOLUTION INTRAVENOUS at 07:47

## 2024-03-15 RX ADMIN — SODIUM CHLORIDE 1.5 ML/KG/HR: 9 INJECTION, SOLUTION INTRAVENOUS at 09:15

## 2024-03-15 ASSESSMENT — ENCOUNTER SYMPTOMS
ENDOCRINE NEGATIVE: 1
SHORTNESS OF BREATH: 1
ALLERGIC/IMMUNOLOGIC NEGATIVE: 1
CONSTITUTIONAL NEGATIVE: 1
PSYCHIATRIC NEGATIVE: 1
NEUROLOGICAL NEGATIVE: 1
EYES NEGATIVE: 1
GASTROINTESTINAL NEGATIVE: 1
MUSCULOSKELETAL NEGATIVE: 1
HEMATOLOGIC/LYMPHATIC NEGATIVE: 1

## 2024-03-15 ASSESSMENT — PAIN SCALES - GENERAL
PAINLEVEL_OUTOF10: 0 - NO PAIN
PAINLEVEL_OUTOF10: 8
PAINLEVEL_OUTOF10: 0 - NO PAIN
PAINLEVEL_OUTOF10: 7
PAINLEVEL_OUTOF10: 7
PAINLEVEL_OUTOF10: 0 - NO PAIN
PAINLEVEL_OUTOF10: 7
PAINLEVEL_OUTOF10: 7
PAINLEVEL_OUTOF10: 0 - NO PAIN
PAINLEVEL_OUTOF10: 8
PAINLEVEL_OUTOF10: 0 - NO PAIN

## 2024-03-15 ASSESSMENT — COLUMBIA-SUICIDE SEVERITY RATING SCALE - C-SSRS
2. HAVE YOU ACTUALLY HAD ANY THOUGHTS OF KILLING YOURSELF?: NO
1. IN THE PAST MONTH, HAVE YOU WISHED YOU WERE DEAD OR WISHED YOU COULD GO TO SLEEP AND NOT WAKE UP?: NO
6. HAVE YOU EVER DONE ANYTHING, STARTED TO DO ANYTHING, OR PREPARED TO DO ANYTHING TO END YOUR LIFE?: NO

## 2024-03-15 NOTE — POST-PROCEDURE NOTE
Physician Transition of Care Summary  Invasive Cardiovascular Lab    Procedure Date: 3/15/2024  Attending:    * Wood Turner - Primary  Resident/Fellow/Other Assistant: Surgeon(s) and Role:    Indications:   Pre-op Diagnosis     * Coronary artery disease of native artery of native heart with stable angina pectoris (CMS/HCC) [I25.118]    Post-procedure diagnosis:   Post-op Diagnosis     * Coronary artery disease of native artery of native heart with stable angina pectoris (CMS/HCC) [I25.118]    Procedure(s):   Left Heart Cath with Coronary Angiography and LV  21562 - IA CATH PLMT L HRT & ARTS W/NJX & ANGIO IMG S&I        Procedure Findings:   70% mid-LAD    Description of the Procedure:   Right radial LHC    Complications:   None    Stents/Implants:       Anticoagulation/Antiplatelet Plan:   ASA    Estimated Blood Loss:   5 mL    Anesthesia: Moderate Sedation Anesthesia Staff: No anesthesia staff entered.    Any Specimen(s) Removed:   Order Name Source Comment Collection Info Order Time   CBC Blood, Venous  Collected By: Aleksey Morales RN 3/15/2024  7:25 AM     Release result to [facility]   Immediate        BASIC METABOLIC PANEL Blood, Venous  Collected By: Aleksey Morales RN 3/15/2024  7:25 AM     Release result to [employer] [employer]   Immediate        COMPREHENSIVE METABOLIC PANEL Blood, Venous  Collected By: Erika Link RN 3/15/2024  8:57 AM     Release result to [employer] [employer]   Immediate            Disposition:   Home      Electronically signed by: Wood Turner MD, 3/15/2024 10:30 AM

## 2024-03-15 NOTE — DISCHARGE INSTRUCTIONS
Start Imdur 30 mg daily.    Follow up with Dr. Turner, his office will contact you to schedule.           CARDIAC CATHETERIZATION DISCHARGE INSTRUCTIONS     FOR SUDDEN AND SEVERE CHEST PAIN, SHORTNESS OF BREATH, EXCESSIVE BLEEDING, SIGNS OF STROKE, OR CHANGES IN MENTAL STATUS YOU SHOULD CALL 911 IMMEDIATELY.     If your provider has prescribed aspirin and/or clopidogrel (Plavix), or prasugrel (Effient), or ticagrelor (Brilinta), DO NOT STOP THESE MEDICATIONS for any reason without talking to your cardiologist first. If any of these were prescribed, you must take them every day without missing a single dose. If you are getting low on these medications, contact your provider immediately for a refill.     FOR NEXT 24 HOURS  - Upon discharge, you should return home and rest for the remainder of the day and evening. You do not have to stay on bed rest but should not be very active.  It is recommended a responsible adult be with you for the first 24 hours after the procedure.    - No driving for 24 hours after procedure. Please arrange for someone to drive you home from the hospital today.     - Do not drive, operate machinery, or use power tools for 24 hours after your procedure.     - Do not make any legal decisions for 24 hours after your procedure.     - Do not drink alcoholic beverages for 24 hours after your procedure.    WOUND CARE   *FOR FEMORAL (LEG) ACCESS*  ·      Avoid heavy lifting (over 10 pounds) for 7 days, squatting or excessive bending for 2 days, and strenuous exercise for 7 days.  ·      No submerged bathing, swimming, or hot tubs for the next 7 days, or until fully healed.  ·      Avoid sexual activity for 3-4 days until any groin discomfort has ceased.     *FOR RADIAL (WRIST) ACCESS*  ·      No lifting more than 5 pounds or excessive use of the wrist for 24 hours - for example, treat your wrist as if it is sprained.  ·      Do not engage in vigorous activities (tennis, golf, bowling, weights) for at  least 48 hours after the procedure.  ·      Do not submerge the wrist for 7 days after the procedure.  ·      You should expect mild tingling in your hand and tenderness at the puncture site for up to 3 days.    - The transparent dressing should be removed from the site 24 hours after the procedure.  Wash the site gently with soap and water. Rinse well and pat dry. Keep the area clean and dry. You may apply a Band-Aid to the site. Avoid lotions, ointments, or powders until fully healed.     - You may shower the day after your procedure.      - It is normal to notice a small bruise around the puncture site and/or a small grape sized or smaller lump. Any large bruising or large lump warrants a call to the office.     - If bleeding should occur, lay down and apply pressure to the affected area for 10 minutes.  If the bleeding stops notify your physician.  If there is a large amount of bleeding or spurting of blood CALL 911 immediately.  DO NOT drive yourself to the hospital.    - You may experience some tenderness, bruising or minimal inflammation.  If you have any concerns, you may contact the Cath Lab or if any of these symptoms become excessive, contact your cardiologist or go to the emergency room.     OTHER INSTRUCTIONS  - You may take acetaminophen (Tylenol) as directed for discomfort.  If pain is not relieved with acetaminophen (Tylenol), contact your doctor.    - If you notice or experience any of the following, you should notify your doctor or seek medical attention  Chest pain or discomfort  Change in mental status or weakness in extremities.  Dizziness, light headedness, or feeling faint.  Change in the site where the procedure was performed, such as bleeding or an increased area of bruising or swelling.  Tingling, numbness, pain, or coolness in the leg/arm beyond the site where the procedure was performed.  Signs of infection (i.e. shaking chills, temperature > 100 degrees Fahrenheit, warmth, redness) in  the leg/arm area where the procedure was performed.  Changes in urination   Bloody or black stools  Vomiting blood  Severe nose bleeds  Any excessive bleeding    - If you DO NOT have an appointment with your cardiologist within 2-4 weeks following your procedure, please contact their office.

## 2024-03-15 NOTE — H&P
History Of Present Illness  Tracy Santoyo is a 73 y.o. female presenting with CAD.     CT coronary angio with heartlow 2/28/24:   IMPRESSION:  1. Multiple calcified plaques in proximal and mid LAD causing to  50-70% stenosis. There is also dense calcified plaque in the proximal  1st diagonal causing approximately 50-70% stenosis. CT FFR suggest  presence of hemodynamically stenosis in the mid and distal LAD.  2. Minimal atherosclerotic calcification in the proximal LCX causing  no significant stenosis. There is also no significant atherosclerotic  disease or stenosis in the RCA.  3. Normal coronary anatomy with right dominant system.      Pmhx significant for CAD, HTN, HLD, PVCs.      Past Medical History  She has a past medical history of Agatston coronary artery calcium score between 200 and 399 (02/12/2024), Encounter for fitting and adjustment of other specified devices (08/23/2022), Encounter for fitting and adjustment of other specified devices (12/02/2019), Hypertension, Other chest pain (02/12/2024), Personal history of other diseases of the nervous system and sense organs (01/15/2019), and Presence of urogenital implants (09/06/2022).    Surgical History  She has a past surgical history that includes Other surgical history (01/15/2019); Other surgical history (01/15/2019); Other surgical history (01/15/2019); Other surgical history (01/15/2019); Other surgical history (01/15/2019); Other surgical history (01/15/2019); Other surgical history (01/15/2019); and Other surgical history (10/18/2022).     Social History  She reports that she has never smoked. She has never used smokeless tobacco. She reports that she does not drink alcohol and does not use drugs.    Family History  No family history on file.     Allergies  Adhesive tape-silicones    Home Medications  No current facility-administered medications on file prior to encounter.     Current Outpatient Medications on File Prior to Encounter   Medication  Sig Dispense Refill   • aspirin 81 mg EC tablet Take 1 tablet (81 mg) by mouth once daily.     • co-enzyme Q-10 50 mg capsule Take 1 capsule (50 mg) by mouth 2 times a day.     • cyanocobalamin (Vitamin B-12) 500 mcg tablet Take by mouth.     • dilTIAZem CD (Cardizem CD) 240 mg 24 hr capsule Take 1 capsule (240 mg) by mouth once daily.     • estradiol (Estrace) 0.01 % (0.1 mg/gram) vaginal cream Insert into the vagina.     • ibuprofen 600 mg tablet Take by mouth.     • levothyroxine (Synthroid, Levoxyl) 125 mcg tablet Take 1 tablet (125 mcg) by mouth once daily.     • magnesium 250 mg tablet Take 1 tablet (250 mg) by mouth twice a day.     • multivitamin tablet Take 1 tablet by mouth once daily.     • nitrofurantoin (Macrodantin) 50 mg capsule 1 capsule (50 mg) by Enteral route.     • nitroglycerin (Nitrostat) 0.3 mg SL tablet Place 1 tablet (0.3 mg) under the tongue.     • omega-3 acid ethyl esters (Lovaza) 1 gram capsule Take 1 capsule (1 g) by mouth 2 times a day.     • polyethylene glycol (Miralax) 17 gram/dose powder Take by mouth.     • [DISCONTINUED] cranberry extract 425 mg capsule Take 1 capsule by mouth once daily.     • [DISCONTINUED] EVENING PRIMROSE OIL ORAL Take by mouth.     • [DISCONTINUED] folic acid/vit B complex and C (B complex-vitamin C-folic acid) 400 mcg tablet extended release Take by mouth.     • [DISCONTINUED] pantoprazole (Protonix) 40 mg EC tablet Take 1 tablet (40 mg) by mouth once daily in the morning. Take before meals. Do not crush, chew, or split. 30 tablet 11          Inpatient Medications:  Scheduled medications   Medication Dose Route Frequency     PRN medications   Medication   • fentaNYL PF   • heparin   • lidocaine PF   • midazolam   • verapamil     Continuous Medications   Medication Dose Last Rate   • sodium chloride 0.9%  100 mL/hr 100 mL/hr (03/15/24 3423)         Review of Systems   Constitutional: Negative.    HENT: Negative.     Eyes: Negative.    Respiratory:  Positive  for shortness of breath (JOVEL).    Cardiovascular:  Positive for chest pain.   Gastrointestinal: Negative.    Endocrine: Negative.    Genitourinary: Negative.    Musculoskeletal: Negative.    Skin: Negative.    Allergic/Immunologic: Negative.    Neurological: Negative.    Hematological: Negative.    Psychiatric/Behavioral: Negative.            Physical Exam    General:  Patient is awake, alert, and oriented.  Patient is in no acute distress.  HEENT:  Pupils equal and reactive.  Normocephalic.  Moist mucosa.    Neck:  No JVD.   Cardiovascular:  Regular rate and rhythm.  Normal S1 and S2. No murmurs/rubs/gallops. Radial pulses 2+.   Pulmonary:  Clear to auscultation bilaterally.  Abdomen:  Soft. Non-tender.   Non-distended.  Positive bowel sounds.  Lower Extremities:  Pedal pulses 2+ No LE edema.  Neurologic:  Cranial nerves II-XII grossly intact.   No focal deficit.   Skin: Skin warm and dry, no lesions. Normal skin turgor.   Psychiatric: Normal affect.     Sedation Preparation   Last Recorded Vitals  Blood pressure 164/75, pulse 59, temperature 36.5 °C (97.7 °F), temperature source Oral, resp. rate 18, SpO2 99 %.         Vitals from the Past 24 Hours  Heart Rate:  [59-62]   Temp:  [36.5 °C (97.7 °F)]   Resp:  [16-18]   BP: (163-164)/(75)   SpO2:  [98 %-99 %]          Relevant Results    Labs    CBC:   Recent Labs     03/15/24  0737 02/09/24  1506 01/29/23  1801 11/01/22  0819 09/27/22  1214 01/14/22  0912   WBC 6.4 11.0 7.5 7.5 7.7 7.0   HGB 14.5 14.5 13.0 13.3 13.5 14.3   HCT 41.9 42.7 38.7 40.3 40.7 44.8    238 235 307 289 280   MCV 91 90 89 93 90 94     BMP/CMP:   Recent Labs     03/15/24  0737 02/09/24  1506 01/29/23  1801 11/01/22  0819 09/27/22  1214 06/19/21  0806    141 138 140 138 140   K 3.6 4.0 3.5 3.8 4.0 3.8    106 104 106 103 105   BUN 12 14 13 12 9 14   CREATININE 0.83 0.80 0.73 0.67 0.71 0.80   CO2 27 27 25 26 28 27   CALCIUM 9.4 9.6 9.3 9.1 9.1 9.5   PROT  --  7.4 6.5 6.7 6.6  --   "  BILITOT  --  0.9 0.6 0.7 0.7  --    ALKPHOS  --  77 50 57 56  --    ALT  --  192* 23 18 15  --    AST  --  351* 39 16 17  --    GLUCOSE 99 99 93 84 85 89      Lipid Panel:   Recent Labs     11/01/22  0819 04/26/22 0756 01/14/22  0912 07/29/21  1405   CHOL 222* 248* 274* 218*   HDL 48.8 53.2 54.7 44.8   CHHDL 4.5 4.7 5.0 4.9   VLDL 27 29 29 50*   TRIG 135 144 146 249*   NHDL  --   --   --  173     Cardiac       No lab exists for component: \"CK\", \"CKMBP\"   Hemoglobin A1C: No results for input(s): \"HGBA1C\" in the last 04154 hours.  TSH/ Free T4:   Recent Labs     11/01/22 0819 04/26/22 0756 01/14/22  0912   TSH 0.99 3.36 4.38*   FREET4 1.39*  --   --      Iron: No results for input(s): \"FERRITIN\", \"TIBC\", \"IRONSAT\", \"BNP\" in the last 59758 hours.  Coag:     ABO: No results found for: \"ABO\"    Past Cardiology Tests (Last 3 Years):  EKG:  Encounter Date: 02/09/24   ECG 12 lead   Result Value    Ventricular Rate 59    Atrial Rate 58    NC Interval 168    QRS Duration 101    QT Interval 440    QTC Calculation(Bazett) 436    P Axis 54    R Axis 46    T Axis 42    QRS Count 10    Q Onset 251    T Offset 471    QTC Fredericia 437    Narrative    Sinus rhythm  Atrial premature complex  Baseline wander in lead(s) V2    See ED provider note for full interpretation and clinical correlation  Confirmed by Kingsley Galo ([id number]) on 2/12/2024 6:56:41 PM     Echo:  No results found for this or any previous visit.    Ejection Fractions:  No results found for: \"EF\"  Cath:  No results found for this or any previous visit.    Stress Test:  No results found for this or any previous visit.    Cardiac Imaging:  Results for orders placed during the hospital encounter of 02/28/24    CT angio coronary art with heartflow if score >30%    Narrative  Interpreted By:  Rosa Maria Zhao,  STUDY:  CT ANGIO CORONARY ART WITH HEARTFLOW IF SCORE >30%;  2/28/2024 2:13 pm    INDICATION:  Signs/Symptoms:Chest pain.  .  There is need to define " coronary  anatomy.    COMPARISON:  None.    ACCESSION NUMBER(S):  [id number]    ORDERING CLINICIAN:  JOSEPHINE ISLAS    TECHNIQUE:  Using multi-detector CT technology,  axial, sequential imaging with  prospective gating was performed of the chest following the  intravenous administration of contrast material.  A low-osmolar  contrast agent was used (70 cc of Omnipaque 350). CT FFR analysis was  also performed.    The patient was premedicated with  5 mg i.v metoprolol and 0.8 mg  sublingual nitroglycerin for heart rate control and coronary  dilation, respectively.    For optimization of anatomic evaluation, multiplanar reconstruction,  maximum intensity projections, and advanced 3-D off-line  postprocessing were performed on a dedicated stand-alone workstation  under the direct supervision of the interpreting physician.    CT Dose-Length Product (DLP):   1122 mGy/cm  CT Dose Reduction Employed: Yes (Prospective triggering, iterative  reconstruction)    FINDINGS:  POTENTIAL STUDY LIMITATIONS:  None.    CORONARY ARTERIES:    CORONARY ANATOMY:  There is normal origin of the coronary arteries.    LEFT MAIN CORONARY ARTERY:  The left main is normal sized vessel that  trifurcates into  LAD,circumflex artery and ramus intermedius. A calcified plaque in  the distal left main coronary artery causing less than 30% stenosis.    LEFT ANTERIOR DESCENDING ARTERY:  The LAD is a normal size vessel that  wraps around the apex.  It gives rise to  2 acute diagonal branches.  Calcified and noncalcified plaques are identified in the proximal and  mid LAD. The most dominant area of stenosis is located within the  most proximal portion of the LAD causing approximately 50-70%  stenosis. There is also dense calcified plaque in the proximal 1st  diagonal causing approximately 50-70% stenosis.    LEFT CIRCUMFLEX ARTERY:  The LCX is a normal size vessel, which is  non-dominant.  It gives rise to  1 obtuse marginal branches.  Calcified plaque in  the proximal LCX causing no significant stenosis.    RAMUS INTERMEDIUS:  The ramus is a small sized vessel.  There is no significant atherosclerotic change or stenotic disease.    RIGHT CORONARY ARTERY:  The RCA is a normal size vessel, which is  dominant .  It gives rise to a  conus branch,  brandon branch, and  3 acute  marginal branches.  In its distal segment it bifurcates into the PDA  and PV branch. There is no significant atherosclerotic change or  stenotic disease.    CARDIAC CHAMBERS:  The cardiac chambers demonstrate normal atrioventricular and  ventriculoarterial concordance, and systemic and pulmonary venous  return.    Cardiac chambers are not enlarged    AORTIC VALVE:  The aortic valve is  trileaflet in morphology.  No calcifications.    MITRAL VALVE:  No thickening/calcification.    THORACIC AORTA:  The visualized thoracic aorta is normal in course, caliber, and  contour. Moderate atherosclerotic disease of the visualized portion  of the thoracic aorta. There is no acute aortic pathology, such as  dissection, intramural hematoma, or contained rupture. The aortic  arch is not included on this examination.    PERICARDIUM:  There is no pericardial effusion of thickening.    CHEST:  The chest wall is normal.  No significant lymphadenopathy or mass is seen in limited images of  the mediastinum. Limited imaging through the lungs reveals no gross  abnormalities. No pleural effusion or pneumothorax.    UPPER ABDOMEN:  Limited imaging through the upper abdomen reveals no abnormalities of  the visualized organs.    Impression  1. Multiple calcified plaques in proximal and mid LAD causing to  50-70% stenosis. There is also dense calcified plaque in the proximal  1st diagonal causing approximately 50-70% stenosis. CT FFR suggest  presence of hemodynamically stenosis in the mid and distal LAD.  2. Minimal atherosclerotic calcification in the proximal LCX causing  no significant stenosis. There is also no significant  atherosclerotic  disease or stenosis in the RCA.  3. Normal coronary anatomy with right dominant system.      MACRO:  None    Signed by: Rosa Maria Roth 2/28/2024 4:13 PM  Dictation workstation:   [id number]        === 02/28/24 ===    CT HEARTFLOW ANALYSIS     Assessment/Plan  Assessment/Plan   Principal Problem:    Coronary artery disease of native artery of native heart with stable angina pectoris (CMS/[facility])        #CAD  -C with Dr. Turner 3/15/24    I spent 30 minutes in the professional and overall care of this patient.      Dilcia Mohamud, APRN-CNP

## 2024-03-20 ENCOUNTER — TELEPHONE (OUTPATIENT)
Dept: CARDIOLOGY | Facility: CLINIC | Age: 74
End: 2024-03-20
Payer: MEDICARE

## 2024-03-20 DIAGNOSIS — I25.118 CORONARY ARTERY DISEASE OF NATIVE ARTERY OF NATIVE HEART WITH STABLE ANGINA PECTORIS (CMS-HCC): Primary | ICD-10-CM

## 2024-04-02 ENCOUNTER — HOSPITAL ENCOUNTER (OUTPATIENT)
Dept: CARDIOLOGY | Facility: CLINIC | Age: 74
Discharge: HOME | End: 2024-04-02
Payer: MEDICARE

## 2024-04-02 ENCOUNTER — OFFICE VISIT (OUTPATIENT)
Dept: CARDIOLOGY | Facility: CLINIC | Age: 74
End: 2024-04-02
Payer: MEDICARE

## 2024-04-02 VITALS
HEART RATE: 76 BPM | SYSTOLIC BLOOD PRESSURE: 120 MMHG | TEMPERATURE: 97.5 F | HEIGHT: 66 IN | BODY MASS INDEX: 24.88 KG/M2 | WEIGHT: 154.8 LBS | DIASTOLIC BLOOD PRESSURE: 65 MMHG

## 2024-04-02 DIAGNOSIS — E78.00 HYPERCHOLESTEREMIA: ICD-10-CM

## 2024-04-02 DIAGNOSIS — I25.10 ATHEROSCLEROTIC HEART DISEASE OF NATIVE CORONARY ARTERY WITHOUT ANGINA PECTORIS: ICD-10-CM

## 2024-04-02 DIAGNOSIS — I25.118 CORONARY ARTERY DISEASE OF NATIVE ARTERY OF NATIVE HEART WITH STABLE ANGINA PECTORIS (CMS-HCC): ICD-10-CM

## 2024-04-02 DIAGNOSIS — I10 ESSENTIAL HYPERTENSION: Primary | ICD-10-CM

## 2024-04-02 DIAGNOSIS — R07.9 CHEST PAIN: ICD-10-CM

## 2024-04-02 LAB
AORTIC VALVE PEAK VELOCITY: 1.31 M/S
AV PEAK GRADIENT: 6.9 MMHG
AVA (PEAK VEL): 2.5 CM2
EJECTION FRACTION APICAL 4 CHAMBER: 64
LEFT ATRIUM VOLUME AREA LENGTH INDEX BSA: 17.5 ML/M2
LEFT VENTRICLE INTERNAL DIMENSION DIASTOLE: 4.17 CM (ref 3.5–6)
LEFT VENTRICULAR OUTFLOW TRACT DIAMETER: 2.07 CM
LV EJECTION FRACTION BIPLANE: 63 %
MITRAL VALVE E/A RATIO: 0.88
MITRAL VALVE E/E' RATIO: 8.51
RIGHT VENTRICLE FREE WALL PEAK S': 11 CM/S
RIGHT VENTRICLE PEAK SYSTOLIC PRESSURE: 23.7 MMHG
TRICUSPID ANNULAR PLANE SYSTOLIC EXCURSION: 2.4 CM

## 2024-04-02 PROCEDURE — 93306 TTE W/DOPPLER COMPLETE: CPT | Performed by: INTERNAL MEDICINE

## 2024-04-02 PROCEDURE — 1159F MED LIST DOCD IN RCRD: CPT | Performed by: INTERNAL MEDICINE

## 2024-04-02 PROCEDURE — 93306 TTE W/DOPPLER COMPLETE: CPT

## 2024-04-02 PROCEDURE — 1160F RVW MEDS BY RX/DR IN RCRD: CPT | Performed by: INTERNAL MEDICINE

## 2024-04-02 PROCEDURE — 99214 OFFICE O/P EST MOD 30 MIN: CPT | Performed by: INTERNAL MEDICINE

## 2024-04-02 PROCEDURE — 3074F SYST BP LT 130 MM HG: CPT | Performed by: INTERNAL MEDICINE

## 2024-04-02 PROCEDURE — 1036F TOBACCO NON-USER: CPT | Performed by: INTERNAL MEDICINE

## 2024-04-02 PROCEDURE — 3078F DIAST BP <80 MM HG: CPT | Performed by: INTERNAL MEDICINE

## 2024-04-02 PROCEDURE — 1157F ADVNC CARE PLAN IN RCRD: CPT | Performed by: INTERNAL MEDICINE

## 2024-04-02 RX ORDER — ROSUVASTATIN CALCIUM 20 MG/1
20 TABLET, COATED ORAL DAILY
Qty: 30 TABLET | Refills: 11 | Status: SHIPPED | OUTPATIENT
Start: 2024-04-02 | End: 2025-04-02

## 2024-04-02 RX ORDER — ISOSORBIDE MONONITRATE 30 MG/1
30 TABLET, EXTENDED RELEASE ORAL DAILY
Qty: 90 TABLET | Refills: 3 | Status: SHIPPED | OUTPATIENT
Start: 2024-04-02 | End: 2025-04-02

## 2024-04-02 NOTE — PROGRESS NOTES
Chief Complaint:   Coronary Artery Disease     History of Present Illness     Tracy Santoyo is a 73 y.o. female presenting with coronary artery disease.  Tracy initially presented with angina and has been treated with anti-anginal medical therapy.  He has not undergone revascularization.  The patient is tolerating guideline-directed medical therapy with antiplatelet and statin medication and is compliant.  The patient walks regularly and follows a heart healthy diet.  The patient has been well since their cath and is not having any anginal symptoms but has stable dyspnea on exertion.      Review of symptoms  All pertinent systems have been reviewed and are negative except for what is stated in the history of present illness.    All other systems have been reviewed and are negative and noncontributory to this patient's current ailments.  .       Previous History     Past Medical History:  She has a past medical history of Agatston coronary artery calcium score between 200 and 399 (02/12/2024), Coronary artery disease involving native coronary artery of native heart without angina pectoris (04/02/2024), Encounter for fitting and adjustment of other specified devices (08/23/2022), Encounter for fitting and adjustment of other specified devices (12/02/2019), Essential hypertension (04/02/2024), Hypercholesteremia (04/02/2024), Hypertension, Other chest pain (02/12/2024), Personal history of other diseases of the nervous system and sense organs (01/15/2019), and Presence of urogenital implants (09/06/2022).    Past Surgical History:  She has a past surgical history that includes Other surgical history (01/15/2019); Other surgical history (01/15/2019); Other surgical history (01/15/2019); Other surgical history (01/15/2019); Other surgical history (01/15/2019); Other surgical history (01/15/2019); Other surgical history (01/15/2019); Other surgical history (10/18/2022); and Cardiac catheterization (N/A, 3/15/2024).     "  Social History:  She reports that she has never smoked. She has never used smokeless tobacco. She reports that she does not drink alcohol and does not use drugs.    Family History:  No family history on file.     Allergies:  Adhesive tape-silicones    Outpatient Medications:  Current Outpatient Medications   Medication Instructions    aspirin 81 mg, oral, Daily    co-enzyme Q-10 50 mg capsule 1 capsule, oral, 2 times daily    cyanocobalamin (Vitamin B-12) 500 mcg tablet oral    dilTIAZem CD (CARDIZEM CD) 240 mg, oral, Daily RT    estradiol (Estrace) 0.01 % (0.1 mg/gram) vaginal cream vaginal    ibuprofen 600 mg tablet oral    isosorbide mononitrate ER (IMDUR) 30 mg, oral, Daily, Do not crush or chew.    levothyroxine (SYNTHROID, LEVOXYL) 125 mcg, oral, Daily RT    magnesium 250 mg, oral, 2 times daily    multivitamin tablet 1 tablet, oral, Daily    nitrofurantoin (MACRODANTIN) 50 mg, Enteral    nitroglycerin (NITROSTAT) 0.3 mg, sublingual    omega-3 acid ethyl esters (LOVAZA) 1 g, oral, 2 times daily    polyethylene glycol (Miralax) 17 gram/dose powder oral       Physical Examination   Vitals:  Visit Vitals  /65 (BP Location: Left arm)   Pulse 76   Temp 36.4 °C (97.5 °F)   Ht 1.676 m (5' 6\")   Wt 70.2 kg (154 lb 12.8 oz)   BMI 24.99 kg/m²   OB Status Postmenopausal   Smoking Status Never   BSA 1.81 m²      Physical Exam  Vitals reviewed.   Constitutional:       General: She is not in acute distress.     Appearance: Normal appearance.   HENT:      Head: Normocephalic and atraumatic.      Nose: Nose normal.   Eyes:      Conjunctiva/sclera: Conjunctivae normal.   Cardiovascular:      Rate and Rhythm: Normal rate and regular rhythm.      Pulses: Normal pulses.      Heart sounds: No murmur heard.  Pulmonary:      Effort: Pulmonary effort is normal. No respiratory distress.      Breath sounds: Normal breath sounds. No wheezing, rhonchi or rales.   Abdominal:      General: Bowel sounds are normal. There is no " "distension.      Palpations: Abdomen is soft.      Tenderness: There is no abdominal tenderness.   Musculoskeletal:         General: No swelling.      Right lower leg: No edema.      Left lower leg: No edema.   Skin:     General: Skin is warm and dry.      Capillary Refill: Capillary refill takes less than 2 seconds.   Neurological:      General: No focal deficit present.      Mental Status: She is alert.   Psychiatric:         Mood and Affect: Mood normal.            Labs/Imaging/Cardiac Studies     Last Labs:  CBC -  Lab Results   Component Value Date    WBC 6.4 03/15/2024    HGB 14.5 03/15/2024    HCT 41.9 03/15/2024    MCV 91 03/15/2024     03/15/2024       CMP -  Lab Results   Component Value Date    CALCIUM 8.6 03/15/2024    PROT 6.1 (L) 03/15/2024    ALBUMIN 3.8 03/15/2024    AST 14 03/15/2024    ALT 14 03/15/2024    ALKPHOS 63 03/15/2024    BILITOT 0.7 03/15/2024       LIPID PANEL -   Lab Results   Component Value Date    CHOL 222 (H) 11/01/2022    HDL 48.8 11/01/2022    CHHDL 4.5 11/01/2022    VLDL 27 11/01/2022    TRIG 135 11/01/2022    NHDL 173 07/29/2021       RENAL FUNCTION PANEL -   Lab Results   Component Value Date    K 3.8 03/15/2024       No results found for: \"BNP\", \"HGBA1C\"    ECG:    Echo: Normal  No echocardiogram results found for the past 12 months       Assessment and Recommendations     Assessment/Plan   1. Essential hypertension  The patient's blood pressure has been well-controlled at today's appointment or by recent primary care provider's measurements/home measurements and meets their goal blood pressure per the ACC/AHA guidelines.  The patient has been compliant with their anti-hypertensive medications and is following a low sodium/DASH diet. I advised continuation of their present medical treatment and lifestyle modification.        2. Hypercholesteremia  The patient's lipids are well controlled on chronic statin therapy and they are meeting their goal LDL cholesterol per the " ACC/AHA guidelines.        3. Coronary artery disease of native artery of native heart with stable angina pectoris (CMS/HCC)  The patient's CAD, as detailed in the HPI, has been clinically stable, without any anginal symptoms or dyspnea.  The patient will continue treatment with guideline-directed medical therapy with antiplatelet and statin medications and was advised regular exercise and a heart healthy diet.  No further angina since on 2 anti-anginals.  No indication for PCI at this time to mid LAD.           Wood Turner MD    Exclusive of any other services or procedures performed, I, Wood Turner MD , spent 30 minutes in duration for this visit today.  This time consisted of chart review, obtaining history, and/or performing the exam as documented above as well as documenting the clinical information for the encounter in the electronic record, discussing treatment options, plans, and/or goals with patient, family, and/or caregiver, refilling medications, updating the electronic record, ordering medicines, lab work, imaging, referrals, and/or procedures as documented above and communicating with other [facility] professionals. I have discussed the results of laboratory, radiology, and cardiology studies with the patient and their family/caregiver.

## 2024-05-16 DIAGNOSIS — I10 ESSENTIAL HYPERTENSION: Primary | ICD-10-CM

## 2024-05-20 ENCOUNTER — TELEPHONE (OUTPATIENT)
Dept: SCHEDULING | Age: 74
End: 2024-05-20
Payer: MEDICARE

## 2024-05-20 DIAGNOSIS — I10 ESSENTIAL HYPERTENSION: ICD-10-CM

## 2024-05-20 RX ORDER — DILTIAZEM HYDROCHLORIDE 240 MG/1
240 CAPSULE, COATED, EXTENDED RELEASE ORAL
Qty: 30 CAPSULE | Refills: 11 | Status: SHIPPED | OUTPATIENT
Start: 2024-05-20 | End: 2024-05-23 | Stop reason: SDUPTHER

## 2024-05-23 RX ORDER — DILTIAZEM HYDROCHLORIDE 240 MG/1
240 CAPSULE, COATED, EXTENDED RELEASE ORAL
Qty: 30 CAPSULE | Refills: 11 | Status: SHIPPED | OUTPATIENT
Start: 2024-05-23 | End: 2025-05-23

## 2024-05-24 ENCOUNTER — LAB (OUTPATIENT)
Dept: LAB | Facility: LAB | Age: 74
End: 2024-05-24
Payer: MEDICARE

## 2024-05-24 DIAGNOSIS — I25.118 CORONARY ARTERY DISEASE OF NATIVE ARTERY OF NATIVE HEART WITH STABLE ANGINA PECTORIS (CMS-HCC): ICD-10-CM

## 2024-05-24 DIAGNOSIS — E78.00 HYPERCHOLESTEREMIA: ICD-10-CM

## 2024-05-24 DIAGNOSIS — I10 ESSENTIAL HYPERTENSION: ICD-10-CM

## 2024-05-24 DIAGNOSIS — R07.9 CHEST PAIN: ICD-10-CM

## 2024-05-24 LAB
ALBUMIN SERPL BCP-MCNC: 4.1 G/DL (ref 3.4–5)
ALP SERPL-CCNC: 66 U/L (ref 33–136)
ALT SERPL W P-5'-P-CCNC: 20 U/L (ref 7–45)
AST SERPL W P-5'-P-CCNC: 17 U/L (ref 9–39)
BILIRUB DIRECT SERPL-MCNC: 0.1 MG/DL (ref 0–0.3)
BILIRUB SERPL-MCNC: 0.6 MG/DL (ref 0–1.2)
CHOLEST SERPL-MCNC: 143 MG/DL (ref 0–199)
CHOLESTEROL/HDL RATIO: 2.4
HDLC SERPL-MCNC: 59.4 MG/DL
LDLC SERPL CALC-MCNC: 70 MG/DL
NON HDL CHOLESTEROL: 84 MG/DL (ref 0–149)
PROT SERPL-MCNC: 6.9 G/DL (ref 6.4–8.2)
TRIGL SERPL-MCNC: 68 MG/DL (ref 0–149)
VLDL: 14 MG/DL (ref 0–40)

## 2024-05-24 PROCEDURE — 36415 COLL VENOUS BLD VENIPUNCTURE: CPT

## 2024-05-24 PROCEDURE — 80076 HEPATIC FUNCTION PANEL: CPT

## 2024-05-24 PROCEDURE — 80061 LIPID PANEL: CPT

## 2024-07-31 ENCOUNTER — OFFICE VISIT (OUTPATIENT)
Dept: CARDIOLOGY | Facility: CLINIC | Age: 74
End: 2024-07-31
Payer: MEDICARE

## 2024-07-31 VITALS
SYSTOLIC BLOOD PRESSURE: 124 MMHG | DIASTOLIC BLOOD PRESSURE: 73 MMHG | OXYGEN SATURATION: 96 % | WEIGHT: 159 LBS | HEART RATE: 64 BPM | HEIGHT: 66 IN | BODY MASS INDEX: 25.55 KG/M2

## 2024-07-31 DIAGNOSIS — I20.89 STABLE ANGINA PECTORIS (CMS-HCC): Primary | ICD-10-CM

## 2024-07-31 DIAGNOSIS — I49.3 PREMATURE VENTRICULAR CONTRACTIONS: ICD-10-CM

## 2024-07-31 DIAGNOSIS — I10 ESSENTIAL HYPERTENSION: ICD-10-CM

## 2024-07-31 PROBLEM — E78.5 HYPERLIPIDEMIA: Status: ACTIVE | Noted: 2024-07-31

## 2024-07-31 PROBLEM — N81.89 PELVIC FLOOR WEAKNESS: Status: ACTIVE | Noted: 2024-07-31

## 2024-07-31 PROBLEM — N39.0 CHRONIC UTI: Status: ACTIVE | Noted: 2024-07-31

## 2024-07-31 PROBLEM — R00.2 HEART PALPITATIONS: Status: ACTIVE | Noted: 2024-07-31

## 2024-07-31 PROBLEM — Z86.010 HISTORY OF COLON POLYPS: Status: ACTIVE | Noted: 2024-07-31

## 2024-07-31 PROBLEM — E03.9 HYPOTHYROIDISM: Status: ACTIVE | Noted: 2024-07-31

## 2024-07-31 PROBLEM — N99.3 VAGINAL VAULT PROLAPSE, POSTHYSTERECTOMY: Status: ACTIVE | Noted: 2024-07-31

## 2024-07-31 PROBLEM — Z86.0100 HISTORY OF COLON POLYPS: Status: ACTIVE | Noted: 2024-07-31

## 2024-07-31 PROBLEM — N39.41 URGE INCONTINENCE: Status: ACTIVE | Noted: 2024-07-31

## 2024-07-31 PROCEDURE — 93005 ELECTROCARDIOGRAM TRACING: CPT | Performed by: INTERNAL MEDICINE

## 2024-07-31 PROCEDURE — 3078F DIAST BP <80 MM HG: CPT | Performed by: INTERNAL MEDICINE

## 2024-07-31 PROCEDURE — 1159F MED LIST DOCD IN RCRD: CPT | Performed by: INTERNAL MEDICINE

## 2024-07-31 PROCEDURE — 1157F ADVNC CARE PLAN IN RCRD: CPT | Performed by: INTERNAL MEDICINE

## 2024-07-31 PROCEDURE — 99214 OFFICE O/P EST MOD 30 MIN: CPT | Performed by: INTERNAL MEDICINE

## 2024-07-31 PROCEDURE — 3074F SYST BP LT 130 MM HG: CPT | Performed by: INTERNAL MEDICINE

## 2024-07-31 PROCEDURE — 1036F TOBACCO NON-USER: CPT | Performed by: INTERNAL MEDICINE

## 2024-07-31 RX ORDER — OMEGA-3 FATTY ACIDS/FISH OIL 340-1000MG
1000 CAPSULE ORAL
COMMUNITY

## 2024-07-31 RX ORDER — NITROGLYCERIN 0.3 MG/1
0.3 TABLET SUBLINGUAL EVERY 5 MIN PRN
Qty: 30 TABLET | Refills: 2 | Status: SHIPPED | OUTPATIENT
Start: 2024-07-31

## 2024-07-31 RX ORDER — GINKGO BILOBA LEAF EXTRACT 60 MG
1 CAPSULE ORAL DAILY
COMMUNITY
Start: 2022-07-27

## 2024-07-31 RX ORDER — DILTIAZEM HYDROCHLORIDE 240 MG/1
240 CAPSULE, COATED, EXTENDED RELEASE ORAL
Qty: 90 CAPSULE | Refills: 3 | Status: SHIPPED | OUTPATIENT
Start: 2024-07-31 | End: 2025-07-31

## 2024-07-31 ASSESSMENT — ENCOUNTER SYMPTOMS
LOSS OF SENSATION IN FEET: 0
DEPRESSION: 0
OCCASIONAL FEELINGS OF UNSTEADINESS: 0

## 2024-07-31 ASSESSMENT — PATIENT HEALTH QUESTIONNAIRE - PHQ9
2. FEELING DOWN, DEPRESSED OR HOPELESS: NOT AT ALL
1. LITTLE INTEREST OR PLEASURE IN DOING THINGS: NOT AT ALL
SUM OF ALL RESPONSES TO PHQ9 QUESTIONS 1 AND 2: 0

## 2024-07-31 NOTE — PATIENT INSTRUCTIONS
It was nice to see you today!  The rhythm looks very good - no  PVCs.  Continue the diltiazem   mg one a day.     Return in one year.

## 2024-07-31 NOTE — PROGRESS NOTES
Returns for follow up visit for    Chief Complaint   Patient presents with    Follow-up    PVCs     Patient denies lightheadedness, syncope, dyspnea, orthopnea and chest pain/discomfort.  JUANCARLOS Lopez RN         History Of Present Illness:    Tracy Santoyo is a 73 y.o. year old female patient   She was having significant chest discomfort associated with nausea and diaphoresis in April 2024. She was evaluated and severe single vessel disease was diagnosed. It was not amenable to stent and therefore isosorbide was initiated and she has had a very good response.   In addition to having resolution of her chest pain she  notes fewer palpitations.    Past Medical History:  Past Medical History:   Diagnosis Date    Agatston coronary artery calcium score between 200 and 399 02/12/2024    CDD=288 (3/21/23)    Coronary artery disease involving native coronary artery of native heart without angina pectoris 04/02/2024    [facility] (3/15/24) 70% mid LAD, 70% OM, 80% D1    Encounter for fitting and adjustment of other specified devices 08/23/2022    Fitting and adjustment of pessary    Encounter for fitting and adjustment of other specified devices 12/02/2019    Fitting and adjustment of pessary    Essential hypertension 04/02/2024    Hypercholesteremia 04/02/2024    Hypertension     Other chest pain 02/12/2024    Personal history of other diseases of the nervous system and sense organs 01/15/2019    History of trigeminal neuralgia    Presence of urogenital implants 09/06/2022    Vaginal pessary present       Past Surgical History:  Past Surgical History:   Procedure Laterality Date    CARDIAC CATHETERIZATION N/A 3/15/2024    Procedure: Left Heart Cath with Coronary Angiography and LV;  Surgeon: Wood Turner MD;  Location: [facility];  Service: Cardiovascular;  Laterality: N/A;    OTHER SURGICAL HISTORY  01/15/2019    Abdominoplasty    OTHER SURGICAL HISTORY  01/15/2019    Cholecystectomy    OTHER SURGICAL HISTORY   01/15/2019    Urethropexy    OTHER SURGICAL HISTORY  01/15/2019    Hysterectomy    OTHER SURGICAL HISTORY  01/15/2019    Liver surgery    OTHER SURGICAL HISTORY  01/15/2019    Sinus surgery    OTHER SURGICAL HISTORY  01/15/2019    Craniotomy    OTHER SURGICAL HISTORY  10/18/2022    Sacrospinous fixation of vaginal vault          Social History:  Caffeine:  none  Cigarettes:  none  ETOH:none  Drugs: none  Exercise: walks 1 x week, free weights 2 x week, golfs once a week,  Occ: retired   Marital status: M    Family History:  Father with CAD/MI age 70's     Mother with mitral valve issues     Allergies:  Allergies   Allergen Reactions    Carbamazepine Other     Myalgia- generalized weakness    Adhesive Tape-Silicones Itching and Hives        Outpatient Medications:  Current Outpatient Medications   Medication Instructions    aspirin 81 mg, oral, Daily    co-enzyme Q-10 50 mg capsule 1 capsule, oral, 2 times daily    cyanocobalamin (Vitamin B-12) 500 mcg tablet oral    dilTIAZem CD (CARDIZEM CD) 240 mg, oral, Daily RT    estradiol (Estrace) 0.01 % (0.1 mg/gram) vaginal cream vaginal    evening primrose oil 500 mg 1 capsule, oral, Daily    isosorbide mononitrate ER (IMDUR) 30 mg, oral, Daily, Do not crush or chew.    levothyroxine (SYNTHROID, LEVOXYL) 125 mcg, oral, Daily RT    magnesium 250 mg, oral, 2 times daily    multivitamin tablet 1 tablet, oral, Daily    nitrofurantoin (MACRODANTIN) 50 mg, Enteral    nitroglycerin (NITROSTAT) 0.3 mg, sublingual    omega-3 acid ethyl esters (LOVAZA) 1 g, oral, 2 times daily    omega-3 fatty acids-fish oil (Fish OiL) 340-1,000 mg capsule 1,000 mg, oral, Daily RT    polyethylene glycol (Miralax) 17 gram/dose powder oral    rosuvastatin (CRESTOR) 20 mg, oral, Daily         Last Recorded Vitals:      3/15/2024    11:00 AM 3/15/2024    11:15 AM 3/15/2024    11:29 AM 3/15/2024    11:45 AM 3/15/2024    12:30 PM 4/2/2024     9:21 AM 7/31/2024     3:44 PM   Vitals   Systolic 133 148  145  "144 120 124   Diastolic 63 73  69 65 65 73   Heart Rate 55 61 59 61 62 76 64   Temp      36.4 °C (97.5 °F)    Resp 12 12 12 14 14     Height (in)      1.676 m (5' 6\") 1.676 m (5' 6\")   Weight (lb)      154.8 159   BMI      24.99 kg/m2 25.66 kg/m2   BSA (m2)      1.81 m2 1.83 m2   Visit Report      Report Report        Physical Exam:  Physical Exam  Constitutional:       Appearance: Normal appearance.   HENT:      Head: Normocephalic.      Nose: Nose normal.   Neck:      Vascular: No carotid bruit.   Cardiovascular:      Rate and Rhythm: Normal rate and regular rhythm.      Pulses: Normal pulses.      Heart sounds: Normal heart sounds. No murmur heard.     No gallop.   Pulmonary:      Effort: Pulmonary effort is normal.      Breath sounds: Normal breath sounds.   Musculoskeletal:         General: Normal range of motion.      Right lower leg: No edema.      Left lower leg: No edema.   Skin:     General: Skin is warm and dry.   Neurological:      General: No focal deficit present.      Mental Status: She is alert and oriented to person, place, and time.   Psychiatric:         Mood and Affect: Mood normal.         Behavior: Behavior normal.          Last Cardiology Tests:  ECG:  Encounter Date: 24   ECG 12 lead   Result Value    Ventricular Rate 59    Atrial Rate 58    NY Interval 168    QRS Duration 101    QT Interval 440    QTC Calculation(Bazett) 436    P Axis 54    R Axis 46    T Axis 42    QRS Count 10    Q Onset 251    T Offset 471    QTC Fredericia 437    Narrative    Sinus rhythm  Atrial premature complex  Baseline wander in lead(s) V2    See ED provider note for full interpretation and clinical correlation  Confirmed by Kingsley Galo ([id number]) on 2024 6:56:41 PM      [id number]  NSR 64 bpm non specific ST abnormality    Echo:  Transthoracic echo (TTE) complete    Result Date: 2024        [facility]       [address] [place] [address]        Tel [phone] and Fax [phone] TRANSTHORACIC ECHOCARDIOGRAM REPORT  Patient Name:      MANE MILLER       Alta Physician:    [id number] Islas MD Study Date:        4/2/2024             Ordering Provider:    [id number] ISLAS MRN/PID:           [id number]             Fellow: Accession#:        [id number]         Nurse: Date of Birth/Age: 1950 / 73      Sonographer:          Jacy LIRA,                    years                                      RDCS Gender:            F                    Additional Staff: Height:            167.64 cm            Admit Date:           4/2/2024 Weight:            68.04 kg             Admission Status:     Outpatient BSA / BMI:         1.77 m2 / 24.21      Encounter#:           [id number]                    kg/m2                                         Department Location:  [facility] Blood Pressure: 120 /65 mmHg Study Type:    TRANSTHORACIC ECHO (TTE) COMPLETE Diagnosis/ICD: Atherosclerotic heart disease of native coronary artery without                angina pectoris-I25.10 Indication:    CAD CPT Code:      Echo Complete w Full Doppler-[id number]  Study Detail: The following Echo studies were performed: M-Mode, 2D, Doppler and               color flow. Image quality for this study is suboptimal.               Technically challenging study due to body habitus. A bubble study               was not performed.  PHYSICIAN INTERPRETATION: Left Ventricle: The left ventricular systolic function is normal. There are no regional wall motion abnormalities. The left ventricular cavity size is normal. There is moderate asymmetric left ventricular hypertrophy involving the septal wall. Spectral Doppler shows an impaired relaxation pattern of left ventricular diastolic filling. Left Atrium: The left atrium is normal in size. Right Ventricle: The right ventricle is normal  in size. There is normal right ventricular global systolic function. Right Atrium: The right atrium is normal in size. Aortic Valve: The aortic valve appears structurally normal. There is no evidence of aortic valve regurgitation. The peak instantaneous gradient of the aortic valve is 6.9 mmHg. Mitral Valve: The mitral valve is mildly thickened. There is mild mitral valve regurgitation. Tricuspid Valve: The tricuspid valve is structurally normal. There is mild tricuspid regurgitation. Pulmonic Valve: The pulmonic valve is structurally normal. There is mild pulmonic valve regurgitation. Pericardium: There is no pericardial effusion noted. Aorta: The aortic root is normal. In comparison to the previous echocardiogram(s): There are no prior studies on this patient for comparison purposes.  CONCLUSIONS:  1. Left ventricular systolic function is normal.  2. Spectral Doppler shows an impaired relaxation pattern of left ventricular diastolic filling.  3. There is moderate asymmetric left ventricular hypertrophy. QUANTITATIVE DATA SUMMARY: 2D MEASUREMENTS:                           Normal Ranges: LAs:           3.60 cm    (2.7-4.0cm) RVIDd:         1.92 cm    (0.9-3.6cm) IVSd:          1.50 cm    (0.6-1.1cm) LVPWd:         1.18 cm    (0.6-1.1cm) LVIDd:         4.17 cm    (3.9-5.9cm) LVIDs:         2.55 cm LV Mass Index: 117.5 g/m2 LV % FS        38.8 % LA VOLUME:                               Normal Ranges: LA Vol A4C:        20.5 ml    (22+/-6mL/m2) LA Vol A2C:        39.6 ml LA Vol BP:         31.0 ml LA Vol Index A4C:  11.6 ml/m2 LA Vol Index A2C:  22.4 ml/m2 LA Vol Index BP:   17.5 ml/m2 LA Area A4C:       9.9 cm2 LA Area A2C:       14.9 cm2 LA Major Axis A4C: 4.0 cm LA Major Axis A2C: 4.8 cm LA Vol A4C:        20.5 ml LA Vol A2C:        36.7 ml RA VOLUME BY A/L METHOD:                               Normal Ranges: RA Vol A4C:        20.1 ml    (8.3-19.5ml) RA Vol Index A4C:  11.4 ml/m2 RA Area A4C:       10.5 cm2 RA  Major Axis A4C: 4.7 cm LV SYSTOLIC FUNCTION BY 2D PLANIMETRY (MOD):                     Normal Ranges: EF-A4C View: 64.0 % (>=55%) EF-A2C View: 63.0 % EF-Biplane:  62.9 % LV DIASTOLIC FUNCTION:                           Normal Ranges: MV Peak E:    0.60 m/s    (0.7-1.2 m/s) MV Peak A:    0.68 m/s    (0.42-0.7 m/s) E/A Ratio:    0.88        (1.0-2.2) MV e'         0.07 m/s    (>8.0) MV lateral e' 0.06 m/s MV medial e'  0.07 m/s MV A Dur:     140.82 msec E/e' Ratio:   8.51        (<8.0) MITRAL VALVE:                 Normal Ranges: MV DT: 290 msec (150-240msec) AORTIC VALVE:                         Normal Ranges: AoV Vmax:      1.31 m/s (<=1.7m/s) AoV Peak P.9 mmHg (<20mmHg) LVOT Max Julio:  0.97 m/s (<=1.1m/s) LVOT VTI:      18.70 cm LVOT Diameter: 2.07 cm  (1.8-2.4cm) AoV Area,Vmax: 2.50 cm2 (2.5-4.5cm2)  RIGHT VENTRICLE: RV Basal 2.69 cm RV Mid   1.92 cm RV Major 5.6 cm TAPSE:   24.1 mm RV s'    0.11 m/s TRICUSPID VALVE/RVSP:                             Normal Ranges: Peak TR Velocity: 2.27 m/s Est. RA Pressure: 3 mmHg RV Syst Pressure: 23.7 mmHg (< 30mmHg) IVC Diam:         0.94 cm PULMONIC VALVE:                      Normal Ranges: PV Max Julio: 1.0 m/s  (0.6-0.9m/s) PV Max P.2 mmHg AORTA: Asc Ao Diam 3.18 cm  [id number] Wood Turner MD Electronically signed on 2024 at 10:41:19 AM  ** Final **         Cath:  3/2024    Cardiac Cath Post Procedure Notes:  Post Procedure Diagnosis: Severe 1V CAD.  Blood Loss:               Estimated blood loss during the procedure was 0 mls.  Specimens Removed:        Number of specimen(s) removed: none.        Recommendations:  Maximize medical therapy.  Agressive risk factor modification efforts.  Severe long mid-LAD and moderate branch vessel disease.     ____________________________________________________________________________________  CONCLUSIONS:   1. Single vessel disease.    Cardiac Imaging:  CT CARDIAC SCORING;  3/21/2023 10:10 am      INDICATION:   CP E78.5:  Hyperlipidemia.      COMPARISON:   None.      ACCESSION NUMBER(S):   [id number]      ORDERING CLINICIAN:   JOSEPHINE ISLAS      TECHNIQUE:   Using prospective ECG gating, CT scan of the coronary arteries was   performed without intravenous contrast. Coronary calcium scoring  was   performed according to the method of Agatston.      FINDINGS:   The score and distribution of calcium in the coronary arteries is as   follows:      LM 0,   .81,   LCx 12.84,   RCA 4.07,      Total   329.72       CT angio 2024    IMPRESSION:  1. Multiple calcified plaques in proximal and mid LAD causing to  50-70% stenosis. There is also dense calcified plaque in the proximal  1st diagonal causing approximately 50-70% stenosis. CT FFR suggest  presence of hemodynamically stenosis in the mid and distal LAD.  2. Minimal atherosclerotic calcification in the proximal LCX causing  no significant stenosis. There is also no significant atherosclerotic  disease or stenosis in the RCA.  3. Normal coronary anatomy with right dominant system.        Lab review: I have Chemistry CMP:   Lab Results   Component Value Date    ALBUMIN 4.1 05/24/2024    CALCIUM 8.6 03/15/2024    CO2 23 03/15/2024    CREATININE 0.65 03/15/2024    GLUCOSE 96 03/15/2024    BILITOT 0.6 05/24/2024    PROT 6.9 05/24/2024    ALT 20 05/24/2024    AST 17 05/24/2024    ALKPHOS 66 05/24/2024   , Chemistry BMP   Lab Results   Component Value Date    GLUCOSE 96 03/15/2024    CALCIUM 8.6 03/15/2024    CO2 23 03/15/2024    CREATININE 0.65 03/15/2024   , and CBC:  Lab Results   Component Value Date    WBC 6.4 03/15/2024    RBC 4.63 03/15/2024    HGB 14.5 03/15/2024    HCT 41.9 03/15/2024    MCV 91 03/15/2024    MCH 31.3 03/15/2024    MCHC 34.6 03/15/2024    RDW 13.6 03/15/2024    NRBC 0.0 03/15/2024       Assessment/Plan   Problem List Items Addressed This Visit             ICD-10-CM    Chest pain - Primary R07.9    Relevant Medications    nitroglycerin (Nitrostat) 0.3 mg SL tablet     Essential hypertension I10    Relevant Medications    dilTIAZem CD (Cardizem CD) 240 mg 24 hr capsule     Other Visit Diagnoses         Codes    Premature ventricular contractions     I49.3    Relevant Medications    dilTIAZem CD (Cardizem CD) 240 mg 24 hr capsule    nitroglycerin (Nitrostat) 0.3 mg SL tablet    Other Relevant Orders    ECG 12 lead (Clinic Performed)            Silvia Can MD

## 2024-08-01 LAB
ATRIAL RATE: 64 BPM
P AXIS: 73 DEGREES
P OFFSET: 181 MS
P ONSET: 146 MS
PR INTERVAL: 164 MS
Q ONSET: 228 MS
QRS COUNT: 11 BEATS
QRS DURATION: 76 MS
QT INTERVAL: 396 MS
QTC CALCULATION(BAZETT): 408 MS
QTC FREDERICIA: 404 MS
R AXIS: 14 DEGREES
T AXIS: 16 DEGREES
T OFFSET: 426 MS
VENTRICULAR RATE: 64 BPM

## 2024-09-24 ENCOUNTER — TELEPHONE (OUTPATIENT)
Dept: CARDIOLOGY | Facility: CLINIC | Age: 74
End: 2024-09-24
Payer: MEDICARE

## 2024-09-24 DIAGNOSIS — E78.00 HYPERCHOLESTEREMIA: Primary | ICD-10-CM

## 2024-09-27 ENCOUNTER — LAB (OUTPATIENT)
Dept: LAB | Facility: LAB | Age: 74
End: 2024-09-27
Payer: MEDICARE

## 2024-09-27 DIAGNOSIS — E78.00 HYPERCHOLESTEREMIA: ICD-10-CM

## 2024-09-27 LAB
ALBUMIN SERPL BCP-MCNC: 4 G/DL (ref 3.4–5)
ALP SERPL-CCNC: 61 U/L (ref 33–136)
ALT SERPL W P-5'-P-CCNC: 40 U/L (ref 7–45)
AST SERPL W P-5'-P-CCNC: 29 U/L (ref 9–39)
BILIRUB DIRECT SERPL-MCNC: 0.1 MG/DL (ref 0–0.3)
BILIRUB SERPL-MCNC: 0.6 MG/DL (ref 0–1.2)
CHOLEST SERPL-MCNC: 140 MG/DL (ref 0–199)
CHOLESTEROL/HDL RATIO: 2.4
HDLC SERPL-MCNC: 58.2 MG/DL
LDLC SERPL CALC-MCNC: 67 MG/DL
NON HDL CHOLESTEROL: 82 MG/DL (ref 0–149)
PROT SERPL-MCNC: 6.9 G/DL (ref 6.4–8.2)
TRIGL SERPL-MCNC: 74 MG/DL (ref 0–149)
VLDL: 15 MG/DL (ref 0–40)

## 2024-09-27 PROCEDURE — 80076 HEPATIC FUNCTION PANEL: CPT

## 2024-09-27 PROCEDURE — 80061 LIPID PANEL: CPT

## 2024-09-27 PROCEDURE — 36415 COLL VENOUS BLD VENIPUNCTURE: CPT

## 2024-10-02 ENCOUNTER — OFFICE VISIT (OUTPATIENT)
Dept: CARDIOLOGY | Facility: CLINIC | Age: 74
End: 2024-10-02
Payer: MEDICARE

## 2024-10-02 ENCOUNTER — HOSPITAL ENCOUNTER (OUTPATIENT)
Dept: CARDIOLOGY | Facility: CLINIC | Age: 74
Discharge: HOME | End: 2024-10-02
Payer: MEDICARE

## 2024-10-02 VITALS
BODY MASS INDEX: 25.5 KG/M2 | DIASTOLIC BLOOD PRESSURE: 61 MMHG | HEART RATE: 75 BPM | SYSTOLIC BLOOD PRESSURE: 119 MMHG | WEIGHT: 158 LBS

## 2024-10-02 DIAGNOSIS — I25.118 CORONARY ARTERY DISEASE OF NATIVE ARTERY OF NATIVE HEART WITH STABLE ANGINA PECTORIS: ICD-10-CM

## 2024-10-02 DIAGNOSIS — R00.2 HEART PALPITATIONS: ICD-10-CM

## 2024-10-02 DIAGNOSIS — R07.9 CHEST PAIN: ICD-10-CM

## 2024-10-02 DIAGNOSIS — I25.10 CORONARY ARTERY DISEASE INVOLVING NATIVE CORONARY ARTERY OF NATIVE HEART WITHOUT ANGINA PECTORIS: Primary | ICD-10-CM

## 2024-10-02 DIAGNOSIS — I25.10 CORONARY ARTERY DISEASE INVOLVING NATIVE CORONARY ARTERY OF NATIVE HEART WITHOUT ANGINA PECTORIS: ICD-10-CM

## 2024-10-02 DIAGNOSIS — E78.00 HYPERCHOLESTEREMIA: ICD-10-CM

## 2024-10-02 DIAGNOSIS — I10 ESSENTIAL HYPERTENSION: ICD-10-CM

## 2024-10-02 PROCEDURE — 3078F DIAST BP <80 MM HG: CPT | Performed by: INTERNAL MEDICINE

## 2024-10-02 PROCEDURE — 99214 OFFICE O/P EST MOD 30 MIN: CPT | Performed by: INTERNAL MEDICINE

## 2024-10-02 PROCEDURE — 3074F SYST BP LT 130 MM HG: CPT | Performed by: INTERNAL MEDICINE

## 2024-10-02 PROCEDURE — 1159F MED LIST DOCD IN RCRD: CPT | Performed by: INTERNAL MEDICINE

## 2024-10-02 PROCEDURE — 1157F ADVNC CARE PLAN IN RCRD: CPT | Performed by: INTERNAL MEDICINE

## 2024-10-02 PROCEDURE — 93246 EXT ECG>7D<15D RECORDING: CPT

## 2024-10-02 PROCEDURE — 1160F RVW MEDS BY RX/DR IN RCRD: CPT | Performed by: INTERNAL MEDICINE

## 2024-10-02 PROCEDURE — 1036F TOBACCO NON-USER: CPT | Performed by: INTERNAL MEDICINE

## 2024-10-02 RX ORDER — ISOSORBIDE MONONITRATE 30 MG/1
30 TABLET, EXTENDED RELEASE ORAL DAILY
Qty: 90 TABLET | Refills: 3 | Status: SHIPPED | OUTPATIENT
Start: 2024-10-02 | End: 2025-10-02

## 2024-10-02 NOTE — PROGRESS NOTES
Chief Complaint:   Coronary Artery Disease     History of Present Illness     Tracy Santoyo is a 73 y.o. female presenting with coronary artery disease.  Tracy initially presented with angina and has been treated with anti-anginal medical therapy for MVD by [facility] 3/15/24.  She has not undergone revascularization.  The patient is tolerating guideline-directed medical therapy with antiplatelet and statin medication and is compliant.  The patient exercises by walking and free weights and follows a heart healthy diet.  The patient has been well since their last office appointment and is not having any anginal symptoms or dyspnea on exertion.  This week had rapid irregular HB x 2 hrs. Different than PVC's.    Review of symptoms  All pertinent systems have been reviewed and are negative except for what is stated in the history of present illness.    All other systems have been reviewed and are negative and noncontributory to this patient's current ailments.  .       Previous History     Past Medical History:  She has a past medical history of Agatston coronary artery calcium score between 200 and 399 (02/12/2024), Coronary artery disease involving native coronary artery of native heart without angina pectoris (04/02/2024), Encounter for fitting and adjustment of other specified devices (08/23/2022), Encounter for fitting and adjustment of other specified devices (12/02/2019), Essential hypertension (04/02/2024), Hypercholesteremia (04/02/2024), Hypertension, Other chest pain (02/12/2024), Personal history of other diseases of the nervous system and sense organs (01/15/2019), and Presence of urogenital implants (09/06/2022).    Past Surgical History:  She has a past surgical history that includes Other surgical history (01/15/2019); Other surgical history (01/15/2019); Other surgical history (01/15/2019); Other surgical history (01/15/2019); Other surgical history (01/15/2019); Other surgical history (01/15/2019); Other  surgical history (01/15/2019); Other surgical history (10/18/2022); and Cardiac catheterization (N/A, 3/15/2024).      Social History:  She reports that she has never smoked. She has never used smokeless tobacco. She reports that she does not drink alcohol and does not use drugs.    Family History:  No family history on file.     Allergies:  Carbamazepine and Adhesive tape-silicones    Outpatient Medications:  Current Outpatient Medications   Medication Instructions    aspirin 81 mg, oral, Daily    co-enzyme Q-10 50 mg capsule 1 capsule, oral, 2 times daily    cyanocobalamin (Vitamin B-12) 500 mcg tablet oral    dilTIAZem CD (CARDIZEM CD) 240 mg, oral, Daily RT    estradiol (Estrace) 0.01 % (0.1 mg/gram) vaginal cream vaginal    evening primrose oil 500 mg 1 capsule, oral, Daily    isosorbide mononitrate ER (IMDUR) 30 mg, oral, Daily, Do not crush or chew.    levothyroxine (SYNTHROID, LEVOXYL) 125 mcg, oral, Daily RT    magnesium 250 mg, oral, 2 times daily    multivitamin tablet 1 tablet, oral, Daily    nitrofurantoin (MACRODANTIN) 50 mg, Enteral    nitroglycerin (NITROSTAT) 0.3 mg, sublingual, Every 5 min PRN    omega-3 acid ethyl esters (LOVAZA) 1 g, oral, 2 times daily    omega-3 fatty acids-fish oil (Fish OiL) 340-1,000 mg capsule 1,000 mg, oral, Daily RT    polyethylene glycol (Miralax) 17 gram/dose powder oral    rosuvastatin (CRESTOR) 20 mg, oral, Daily       Physical Examination   Vitals:  Visit Vitals  /61 (BP Location: Right arm, Patient Position: Sitting, BP Cuff Size: Adult)   Pulse 75   Wt 71.7 kg (158 lb)   BMI 25.50 kg/m²   OB Status Postmenopausal   Smoking Status Never   BSA 1.83 m²    Physical Exam  Vitals reviewed.   Constitutional:       General: She is not in acute distress.     Appearance: Normal appearance.   HENT:      Head: Normocephalic and atraumatic.      Nose: Nose normal.   Eyes:      Conjunctiva/sclera: Conjunctivae normal.   Cardiovascular:      Rate and Rhythm: Normal rate and  "regular rhythm.      Pulses: Normal pulses.      Heart sounds: No murmur heard.  Pulmonary:      Effort: Pulmonary effort is normal. No respiratory distress.      Breath sounds: Normal breath sounds. No wheezing, rhonchi or rales.   Abdominal:      General: Bowel sounds are normal. There is no distension.      Palpations: Abdomen is soft.      Tenderness: There is no abdominal tenderness.   Musculoskeletal:         General: No swelling.      Right lower leg: No edema.      Left lower leg: No edema.   Skin:     General: Skin is warm and dry.      Capillary Refill: Capillary refill takes less than 2 seconds.   Neurological:      General: No focal deficit present.      Mental Status: She is alert.   Psychiatric:         Mood and Affect: Mood normal.             Labs/Imaging/Cardiac Studies     Last Labs:  CBC -  Lab Results   Component Value Date    WBC 6.4 03/15/2024    HGB 14.5 03/15/2024    HCT 41.9 03/15/2024    MCV 91 03/15/2024     03/15/2024       CMP -  Lab Results   Component Value Date    CALCIUM 8.6 03/15/2024    PROT 6.9 09/27/2024    ALBUMIN 4.0 09/27/2024    AST 29 09/27/2024    ALT 40 09/27/2024    ALKPHOS 61 09/27/2024    BILITOT 0.6 09/27/2024       LIPID PANEL -   Lab Results   Component Value Date    CHOL 140 09/27/2024    HDL 58.2 09/27/2024    CHHDL 2.4 09/27/2024    VLDL 15 09/27/2024    TRIG 74 09/27/2024    NHDL 82 09/27/2024       RENAL FUNCTION PANEL -   Lab Results   Component Value Date    K 3.8 03/15/2024       No results found for: \"BNP\", \"HGBA1C\"    ECG:    Echo:  Transthoracic echo (TTE) complete    Result Date: 4/2/2024        [facility] [place], [place] [address]        Tel [phone] and Fax [phone] TRANSTHORACIC ECHOCARDIOGRAM REPORT  Patient Name:      MANE MILLER       Alta Physician:    Jean Carlos Turner                                                          "      MD Study Date:        4/2/2024             Ordering Provider:    [id number] JOSEPHINE ISLAS MRN/PID:           [id number]             Fellow: Accession#:        [id number]         Nurse: Date of Birth/Age: 1950 / 73      Sonographer:          Jacy Amador ACS,                    years                                      RDCS Gender:            F                    Additional Staff: Height:            167.64 cm            Admit Date:           4/2/2024 Weight:            68.04 kg             Admission Status:     Outpatient BSA / BMI:         1.77 m2 / 24.21      Encounter#:           [id number]                    kg/m2                                         Department Location:  [facility] Blood Pressure: 120 /65 mmHg Study Type:    TRANSTHORACIC ECHO (TTE) COMPLETE Diagnosis/ICD: Atherosclerotic heart disease of native coronary artery without                angina pectoris-I25.10 Indication:    CAD CPT Code:      Echo Complete w Full Doppler-[id number]  Study Detail: The following Echo studies were performed: M-Mode, 2D, Doppler and               color flow. Image quality for this study is suboptimal.               Technically challenging study due to body habitus. A bubble study               was not performed.  PHYSICIAN INTERPRETATION: Left Ventricle: The left ventricular systolic function is normal. There are no regional wall motion abnormalities. The left ventricular cavity size is normal. There is moderate asymmetric left ventricular hypertrophy involving the septal wall. Spectral Doppler shows an impaired relaxation pattern of left ventricular diastolic filling. Left Atrium: The left atrium is normal in size. Right Ventricle: The right ventricle is normal in size. There is normal right ventricular global systolic function. Right Atrium: The right atrium is normal in size. Aortic Valve: The aortic valve appears structurally normal. There is no evidence of aortic valve regurgitation. The peak instantaneous  gradient of the aortic valve is 6.9 mmHg. Mitral Valve: The mitral valve is mildly thickened. There is mild mitral valve regurgitation. Tricuspid Valve: The tricuspid valve is structurally normal. There is mild tricuspid regurgitation. Pulmonic Valve: The pulmonic valve is structurally normal. There is mild pulmonic valve regurgitation. Pericardium: There is no pericardial effusion noted. Aorta: The aortic root is normal. In comparison to the previous echocardiogram(s): There are no prior studies on this patient for comparison purposes.  CONCLUSIONS:  1. Left ventricular systolic function is normal.  2. Spectral Doppler shows an impaired relaxation pattern of left ventricular diastolic filling.  3. There is moderate asymmetric left ventricular hypertrophy. QUANTITATIVE DATA SUMMARY: 2D MEASUREMENTS:                           Normal Ranges: LAs:           3.60 cm    (2.7-4.0cm) RVIDd:         1.92 cm    (0.9-3.6cm) IVSd:          1.50 cm    (0.6-1.1cm) LVPWd:         1.18 cm    (0.6-1.1cm) LVIDd:         4.17 cm    (3.9-5.9cm) LVIDs:         2.55 cm LV Mass Index: 117.5 g/m2 LV % FS        38.8 % LA VOLUME:                               Normal Ranges: LA Vol A4C:        20.5 ml    (22+/-6mL/m2) LA Vol A2C:        39.6 ml LA Vol BP:         31.0 ml LA Vol Index A4C:  11.6 ml/m2 LA Vol Index A2C:  22.4 ml/m2 LA Vol Index BP:   17.5 ml/m2 LA Area A4C:       9.9 cm2 LA Area A2C:       14.9 cm2 LA Major Axis A4C: 4.0 cm LA Major Axis A2C: 4.8 cm LA Vol A4C:        20.5 ml LA Vol A2C:        36.7 ml RA VOLUME BY A/L METHOD:                               Normal Ranges: RA Vol A4C:        20.1 ml    (8.3-19.5ml) RA Vol Index A4C:  11.4 ml/m2 RA Area A4C:       10.5 cm2 RA Major Axis A4C: 4.7 cm LV SYSTOLIC FUNCTION BY 2D PLANIMETRY (MOD):                     Normal Ranges: EF-A4C View: 64.0 % (>=55%) EF-A2C View: 63.0 % EF-Biplane:  62.9 % LV DIASTOLIC FUNCTION:                           Normal Ranges: MV Peak E:    0.60 m/s     (0.7-1.2 m/s) MV Peak A:    0.68 m/s    (0.42-0.7 m/s) E/A Ratio:    0.88        (1.0-2.2) MV e'         0.07 m/s    (>8.0) MV lateral e' 0.06 m/s MV medial e'  0.07 m/s MV A Dur:     140.82 msec E/e' Ratio:   8.51        (<8.0) MITRAL VALVE:                 Normal Ranges: MV DT: 290 msec (150-240msec) AORTIC VALVE:                         Normal Ranges: AoV Vmax:      1.31 m/s (<=1.7m/s) AoV Peak P.9 mmHg (<20mmHg) LVOT Max Julio:  0.97 m/s (<=1.1m/s) LVOT VTI:      18.70 cm LVOT Diameter: 2.07 cm  (1.8-2.4cm) AoV Area,Vmax: 2.50 cm2 (2.5-4.5cm2)  RIGHT VENTRICLE: RV Basal 2.69 cm RV Mid   1.92 cm RV Major 5.6 cm TAPSE:   24.1 mm RV s'    0.11 m/s TRICUSPID VALVE/RVSP:                             Normal Ranges: Peak TR Velocity: 2.27 m/s Est. RA Pressure: 3 mmHg RV Syst Pressure: 23.7 mmHg (< 30mmHg) IVC Diam:         0.94 cm PULMONIC VALVE:                      Normal Ranges: PV Max Julio: 1.0 m/s  (0.6-0.9m/s) PV Max P.2 mmHg AORTA: Asc Ao Diam 3.18 cm  [id number] Wood Turner MD Electronically signed on 2024 at 10:41:19 AM  ** Final **         Assessment and Recommendations     Assessment/Plan       1. Essential hypertension  The patient's blood pressure has been well-controlled at today's appointment or by recent primary care provider's measurements/home measurements and meets their goal blood pressure per the ACC/AHA guidelines.  The patient has been compliant with their anti-hypertensive medications and is following a low sodium/DASH diet. I advised continuation of their present medical treatment and lifestyle modification.      - Follow Up In Cardiology    2. Hypercholesteremia  The patient's lipids are well controlled on chronic statin therapy and they are meeting their goal LDL cholesterol per the ACC/AHA guidelines.      - Follow Up In Cardiology    3. Coronary artery disease involving native coronary artery of native heart without angina pectoris  The patient's CAD, as detailed in the HPI, has  been clinically stable, without any anginal symptoms or dyspnea.  The patient will continue treatment with guideline-directed medical therapy with antiplatelet and statin medications and was advised regular exercise and a heart healthy diet.    '     4. Palpitations  2 week Holter.    Tracy Santoyo will return in 1 year for an office visit.       Wood Turner MD    Exclusive of any other services or procedures performed, I, Wood Turner MD , spent 30 minutes in duration for this visit today.  This time consisted of chart review, obtaining history, and/or performing the exam as documented above as well as documenting the clinical information for the encounter in the electronic record, discussing treatment options, plans, and/or goals with patient, family, and/or caregiver, refilling medications, updating the electronic record, ordering medicines, lab work, imaging, referrals, and/or procedures as documented above and communicating with other [facility] professionals. I have discussed the results of laboratory, radiology, and cardiology studies with the patient and their family/caregiver.

## 2024-10-22 ENCOUNTER — HOSPITAL ENCOUNTER (EMERGENCY)
Facility: HOSPITAL | Age: 74
Discharge: HOME | End: 2024-10-22
Payer: MEDICARE

## 2024-10-22 VITALS
DIASTOLIC BLOOD PRESSURE: 76 MMHG | WEIGHT: 155 LBS | TEMPERATURE: 98.2 F | SYSTOLIC BLOOD PRESSURE: 144 MMHG | HEART RATE: 75 BPM | OXYGEN SATURATION: 98 % | BODY MASS INDEX: 24.91 KG/M2 | RESPIRATION RATE: 17 BRPM | HEIGHT: 66 IN

## 2024-10-22 DIAGNOSIS — R04.0 ACUTE ANTERIOR EPISTAXIS: Primary | ICD-10-CM

## 2024-10-22 PROCEDURE — 2500000001 HC RX 250 WO HCPCS SELF ADMINISTERED DRUGS (ALT 637 FOR MEDICARE OP): Performed by: PHYSICIAN ASSISTANT

## 2024-10-22 PROCEDURE — 99283 EMERGENCY DEPT VISIT LOW MDM: CPT

## 2024-10-22 PROCEDURE — 30901 CONTROL OF NOSEBLEED: CPT | Mod: LT

## 2024-10-22 RX ORDER — OXYMETAZOLINE HCL 0.05 %
2 SPRAY, NON-AEROSOL (ML) NASAL EVERY 12 HOURS PRN
Status: DISCONTINUED | OUTPATIENT
Start: 2024-10-22 | End: 2024-10-22 | Stop reason: HOSPADM

## 2024-10-22 RX ORDER — AMOXICILLIN AND CLAVULANATE POTASSIUM 875; 125 MG/1; MG/1
1 TABLET, FILM COATED ORAL EVERY 12 HOURS
Qty: 14 TABLET | Refills: 0 | Status: SHIPPED | OUTPATIENT
Start: 2024-10-22 | End: 2024-10-29

## 2024-10-22 RX ORDER — SILVER NITRATE 38.21; 12.74 MG/1; MG/1
STICK TOPICAL ONCE
Status: COMPLETED | OUTPATIENT
Start: 2024-10-22 | End: 2024-10-22

## 2024-10-22 ASSESSMENT — COLUMBIA-SUICIDE SEVERITY RATING SCALE - C-SSRS
6. HAVE YOU EVER DONE ANYTHING, STARTED TO DO ANYTHING, OR PREPARED TO DO ANYTHING TO END YOUR LIFE?: NO
1. IN THE PAST MONTH, HAVE YOU WISHED YOU WERE DEAD OR WISHED YOU COULD GO TO SLEEP AND NOT WAKE UP?: NO
2. HAVE YOU ACTUALLY HAD ANY THOUGHTS OF KILLING YOURSELF?: NO

## 2024-10-22 ASSESSMENT — PAIN SCALES - GENERAL: PAINLEVEL_OUTOF10: 0 - NO PAIN

## 2024-10-22 ASSESSMENT — PAIN - FUNCTIONAL ASSESSMENT: PAIN_FUNCTIONAL_ASSESSMENT: 0-10

## 2024-10-22 NOTE — ED PROVIDER NOTES
HPI   Chief Complaint   Patient presents with    Epistaxis (Nose Bleed)     Pt c/o nosebleed (L nare) since 0900 today, significant amount when it started, flow is much less now. Pt states this is her 5th nosebleed in two weeks. Denies any associated S/Sx. Reports daily baby aspirin use.        History of present illness: 73-year-old female complains of nosebleed today.  Patient states this is the fifth time this is occurred over the last 2 weeks.  Patient states that each of the other times she was able to hold her nose for 15 minutes and it stopped bleeding.  Today she blew her nose and it began bleeding again.  she attempted holding pressure once and it began bleeding once again.  She says that it soiled A couple tissues but there was not significant amount of blood loss.  Denies dizziness lightheadedness chest pain shortness of breath fevers chills sweats.  Patient takes daily aspirin with no oral anticoagulants otherwise.    Review of systems: Constitutional, eye, ENT, cardiovascular, respiratory, gastrointestinal, genitourinary, neurologic, musculoskeletal, dermatologic, hematologic, endocrine systems were evaluated and were negative unless otherwise specified in history of present illness.    Medications: Reviewed and per nursing note.    Family history: Denies relevant medical conditions.    Social history: Denies tobacco, alcohol, drug use.      Physical exam:    Appearance: Well-developed, well-nourished, nontoxic-appearing, alert and oriented x3. Talking in complete sentences.    HEENT: Scant blood in left anterior septum.  No blood in oropharynx.  Head normocephalic atraumatic, extraocular movements intact, pupils equal round reactive to light, mucous membranes are moist and pink, normal facial symmetry. No pharyngeal erythema, edema, exudates. Uvula is midline with no stridor, drooling, trismus. No induration the floor of the mouth.  Normal tympanic membranes.    NECK:  Nml Inspection, no meningismus,  no thyromegaly, no lymphadenopathy, trachea is midline, no JVD.    Respiratory: Clear to auscultation bilaterally with normal bilateral excursion. No wheezes, rhonchi, rales.    Cardiovascular: Regular rate and rhythm, no murmurs rubs or gallops. Pulses 2+ symmetrically in the dorsalis pedis and radial pulses.    Abdomen/GI:  Soft, nontender.    :  No CVA tenderness    Neuro:  Oriented x 3, Speech Clear, cranial nerves grossly intact.    Musculoskeletal: Patient spontaneously moves all 4 extremities.    Skin:  No rashes.            Patient History   Past Medical History:   Diagnosis Date    Agatston coronary artery calcium score between 200 and 399 02/12/2024    TLD=962 (3/21/23)    Coronary artery disease involving native coronary artery of native heart without angina pectoris 04/02/2024    [facility] (3/15/24) 70% mid LAD, 70% OM, 80% D1    Encounter for fitting and adjustment of other specified devices 08/23/2022    Fitting and adjustment of pessary    Encounter for fitting and adjustment of other specified devices 12/02/2019    Fitting and adjustment of pessary    Essential hypertension 04/02/2024    Hypercholesteremia 04/02/2024    Hypertension     Other chest pain 02/12/2024    Personal history of other diseases of the nervous system and sense organs 01/15/2019    History of trigeminal neuralgia    Presence of urogenital implants 09/06/2022    Vaginal pessary present     Past Surgical History:   Procedure Laterality Date    CARDIAC CATHETERIZATION N/A 3/15/2024    Procedure: Left Heart Cath with Coronary Angiography and LV;  Surgeon: Wood Turner MD;  Location: [facility] Card[facility] Lab;  Service: Cardiovascular;  Laterality: N/A;    OTHER SURGICAL HISTORY  01/15/2019    Abdominoplasty    OTHER SURGICAL HISTORY  01/15/2019    Cholecystectomy    OTHER SURGICAL HISTORY  01/15/2019    Urethropexy    OTHER SURGICAL HISTORY  01/15/2019    Hysterectomy    OTHER SURGICAL HISTORY  01/15/2019    Liver surgery    OTHER SURGICAL  HISTORY  01/15/2019    Sinus surgery    OTHER SURGICAL HISTORY  01/15/2019    Craniotomy    OTHER SURGICAL HISTORY  10/18/2022    Sacrospinous fixation of vaginal vault     No family history on file.  Social History     Tobacco Use    Smoking status: Never    Smokeless tobacco: Never   Vaping Use    Vaping status: Never Used   Substance Use Topics    Alcohol use: Never    Drug use: Never       Physical Exam   ED Triage Vitals [10/22/24 1156]   Temperature Heart Rate Respirations BP   36.8 °C (98.2 °F) 61 16 132/72      Pulse Ox Temp Source Heart Rate Source Patient Position   97 % Temporal Monitor Lying      BP Location FiO2 (%)     Left arm --       Physical Exam      ED Course & MDM   Diagnoses as of 10/22/24 1329   Acute anterior epistaxis                 No data recorded     Neelam Coma Scale Score: 15 (10/22/24 1156 : Ruben Patrick RN)                           Medical Decision Making  23-year-old female with nosebleed today.  Differential diagnosis of anterior posterior epistaxis, acute blood loss, hypertension.  Blood pressure minimally elevated.  No hypotension.  No cardiopulmonary symptoms.  Patient has small amount of bleeding in left anterior nares.  Epistaxis repair performed with silver nitrate.  Patient bleeding discontinued.    Procedure: Epistaxis repair  Epistaxis repair was performed physician assistant.  Patient had bleeding left anterior nares.  Oxymetazoline on cotton swabs were placed in bilateral nares.  After removal there was small amount of bleeding in the left anterior septum.  Silver nitrate applied with complete resolution with cauterization.  Patient was observed for over 10 minutes with no residual bleeding.  No complications.    Patient will be discharged to home with prescription for Augmentin and given oxymetazoline.  To be used for 2 days.  Recommend nasal saline daily.  Patient is educated in signs and symptoms of worsening symptoms and reasons to come back to the emergency  department.  Will need to follow up with primary care provider and follow-up with ENT if persistent issues.  Patient does not report social determinants of health impacting ability to obtain care that is needed.  Patient agrees with plan.    This is a transcription.  Text was reviewed for errors, but some transcription errors may remain.  Please call for any questions.          Procedure  Procedures     Kobe Mcgovern PA-C  10/22/24 1415

## 2024-10-22 NOTE — ED TRIAGE NOTES
Pt c/o nosebleed (L nare) since 0900 today, significant amount when it started, flow is much less now. Pt states this is her 5th nosebleed in two weeks. Denies any associated S/Sx. Reports daily baby aspirin use.

## 2024-10-24 ENCOUNTER — TELEPHONE (OUTPATIENT)
Dept: CARDIOLOGY | Facility: CLINIC | Age: 74
End: 2024-10-24
Payer: MEDICARE

## 2024-10-24 NOTE — TELEPHONE ENCOUNTER
----- Message from Wood Turner sent at 10/24/2024  7:46 AM EDT -----  Holter just PAC's 9% no AF; Same meds.  Nothing serious.  ----- Message -----  From: Wood Turner MD  Sent: 10/23/2024   1:14 PM EDT  To: Wood Turner MD

## 2024-12-30 DIAGNOSIS — R06.02 SHORTNESS OF BREATH: Primary | ICD-10-CM

## 2024-12-30 DIAGNOSIS — R07.9 CHEST PAIN: ICD-10-CM

## 2024-12-30 DIAGNOSIS — I25.118 CORONARY ARTERY DISEASE OF NATIVE ARTERY OF NATIVE HEART WITH STABLE ANGINA PECTORIS: ICD-10-CM

## 2024-12-30 RX ORDER — ISOSORBIDE MONONITRATE 60 MG/1
60 TABLET, EXTENDED RELEASE ORAL DAILY
Qty: 30 TABLET | Refills: 0 | Status: SHIPPED | OUTPATIENT
Start: 2024-12-30 | End: 2025-01-29

## 2025-01-06 ENCOUNTER — LAB (OUTPATIENT)
Dept: LAB | Facility: LAB | Age: 75
End: 2025-01-06
Payer: MEDICARE

## 2025-01-06 DIAGNOSIS — R06.02 SHORTNESS OF BREATH: ICD-10-CM

## 2025-01-06 LAB
ANION GAP SERPL CALC-SCNC: 8 MMOL/L (ref 10–20)
BNP SERPL-MCNC: 101 PG/ML (ref 0–99)
BUN SERPL-MCNC: 13 MG/DL (ref 6–23)
CALCIUM SERPL-MCNC: 9.4 MG/DL (ref 8.6–10.3)
CHLORIDE SERPL-SCNC: 105 MMOL/L (ref 98–107)
CO2 SERPL-SCNC: 29 MMOL/L (ref 21–32)
CREAT SERPL-MCNC: 0.81 MG/DL (ref 0.5–1.05)
EGFRCR SERPLBLD CKD-EPI 2021: 75 ML/MIN/1.73M*2
GLUCOSE SERPL-MCNC: 81 MG/DL (ref 74–99)
POTASSIUM SERPL-SCNC: 4 MMOL/L (ref 3.5–5.3)
SODIUM SERPL-SCNC: 138 MMOL/L (ref 136–145)

## 2025-01-06 PROCEDURE — 80048 BASIC METABOLIC PNL TOTAL CA: CPT

## 2025-01-06 PROCEDURE — 83880 ASSAY OF NATRIURETIC PEPTIDE: CPT

## 2025-01-07 ENCOUNTER — TELEPHONE (OUTPATIENT)
Dept: CARDIOLOGY | Facility: CLINIC | Age: 75
End: 2025-01-07
Payer: MEDICARE

## 2025-01-07 NOTE — TELEPHONE ENCOUNTER
----- Message from Wood Turner sent at 1/7/2025 11:31 AM EST -----  No evidence her dyspnea is heart failure,  BNP and BMP normal.  ----- Message -----  From: Lab, Background User  Sent: 1/6/2025   8:52 PM EST  To: Wood Turner MD

## 2025-01-10 ENCOUNTER — LAB (OUTPATIENT)
Dept: LAB | Facility: LAB | Age: 75
End: 2025-01-10
Payer: MEDICARE

## 2025-01-10 DIAGNOSIS — R53.83 OTHER FATIGUE: ICD-10-CM

## 2025-01-10 DIAGNOSIS — Z80.0 FAMILY HISTORY OF MALIGNANT NEOPLASM OF DIGESTIVE ORGANS: ICD-10-CM

## 2025-01-10 DIAGNOSIS — E03.9 HYPOTHYROIDISM, UNSPECIFIED: Primary | ICD-10-CM

## 2025-01-10 LAB
BASOPHILS # BLD AUTO: 0.03 X10*3/UL (ref 0–0.1)
BASOPHILS NFR BLD AUTO: 0.4 %
EOSINOPHIL # BLD AUTO: 0.37 X10*3/UL (ref 0–0.4)
EOSINOPHIL NFR BLD AUTO: 5.5 %
ERYTHROCYTE [DISTWIDTH] IN BLOOD BY AUTOMATED COUNT: 14.3 % (ref 11.5–14.5)
HCT VFR BLD AUTO: 42.8 % (ref 36–46)
HGB BLD-MCNC: 14.4 G/DL (ref 12–16)
IMM GRANULOCYTES # BLD AUTO: 0.01 X10*3/UL (ref 0–0.5)
IMM GRANULOCYTES NFR BLD AUTO: 0.1 % (ref 0–0.9)
LYMPHOCYTES # BLD AUTO: 2.52 X10*3/UL (ref 0.8–3)
LYMPHOCYTES NFR BLD AUTO: 37.8 %
MCH RBC QN AUTO: 30.4 PG (ref 26–34)
MCHC RBC AUTO-ENTMCNC: 33.6 G/DL (ref 32–36)
MCV RBC AUTO: 91 FL (ref 80–100)
MONOCYTES # BLD AUTO: 0.54 X10*3/UL (ref 0.05–0.8)
MONOCYTES NFR BLD AUTO: 8.1 %
NEUTROPHILS # BLD AUTO: 3.2 X10*3/UL (ref 1.6–5.5)
NEUTROPHILS NFR BLD AUTO: 48.1 %
NRBC BLD-RTO: 0 /100 WBCS (ref 0–0)
PLATELET # BLD AUTO: 238 X10*3/UL (ref 150–450)
RBC # BLD AUTO: 4.73 X10*6/UL (ref 4–5.2)
TSH SERPL-ACNC: 1.35 MIU/L (ref 0.44–3.98)
WBC # BLD AUTO: 6.7 X10*3/UL (ref 4.4–11.3)

## 2025-01-10 PROCEDURE — 84484 ASSAY OF TROPONIN QUANT: CPT

## 2025-01-10 PROCEDURE — 85025 COMPLETE CBC W/AUTO DIFF WBC: CPT

## 2025-01-10 PROCEDURE — 84443 ASSAY THYROID STIM HORMONE: CPT

## 2025-01-15 ENCOUNTER — OFFICE VISIT (OUTPATIENT)
Dept: CARDIOLOGY | Facility: CLINIC | Age: 75
End: 2025-01-15
Payer: MEDICARE

## 2025-01-15 ENCOUNTER — HOSPITAL ENCOUNTER (OUTPATIENT)
Dept: CARDIOLOGY | Facility: CLINIC | Age: 75
Discharge: HOME | End: 2025-01-15
Payer: MEDICARE

## 2025-01-15 VITALS
DIASTOLIC BLOOD PRESSURE: 66 MMHG | BODY MASS INDEX: 25.82 KG/M2 | WEIGHT: 160 LBS | SYSTOLIC BLOOD PRESSURE: 144 MMHG | HEART RATE: 60 BPM

## 2025-01-15 DIAGNOSIS — I25.118 CORONARY ARTERY DISEASE OF NATIVE ARTERY OF NATIVE HEART WITH STABLE ANGINA PECTORIS: ICD-10-CM

## 2025-01-15 DIAGNOSIS — I25.10 CORONARY ARTERY DISEASE INVOLVING NATIVE CORONARY ARTERY OF NATIVE HEART WITHOUT ANGINA PECTORIS: ICD-10-CM

## 2025-01-15 DIAGNOSIS — I49.1 PAC (PREMATURE ATRIAL CONTRACTION): Primary | ICD-10-CM

## 2025-01-15 DIAGNOSIS — R06.09 DOE (DYSPNEA ON EXERTION): ICD-10-CM

## 2025-01-15 DIAGNOSIS — E78.2 MIXED HYPERLIPIDEMIA: ICD-10-CM

## 2025-01-15 DIAGNOSIS — I10 ESSENTIAL HYPERTENSION: ICD-10-CM

## 2025-01-15 DIAGNOSIS — R06.02 SHORTNESS OF BREATH: ICD-10-CM

## 2025-01-15 DIAGNOSIS — R07.9 CHEST PAIN: ICD-10-CM

## 2025-01-15 DIAGNOSIS — I20.9 ANGINA PECTORIS, UNSPECIFIED: ICD-10-CM

## 2025-01-15 LAB
AORTIC VALVE PEAK VELOCITY: 1.48 M/S
AV PEAK GRADIENT: 9 MMHG
AVA (PEAK VEL): 2.08 CM2
EJECTION FRACTION APICAL 4 CHAMBER: 66.2
EJECTION FRACTION: 66 %
LEFT ATRIUM VOLUME AREA LENGTH INDEX BSA: 26.8 ML/M2
LEFT VENTRICLE INTERNAL DIMENSION DIASTOLE: 4.72 CM (ref 3.5–6)
LEFT VENTRICULAR OUTFLOW TRACT DIAMETER: 2.03 CM
MITRAL VALVE E/A RATIO: 0.73
RIGHT VENTRICLE FREE WALL PEAK S': 13 CM/S
RIGHT VENTRICLE PEAK SYSTOLIC PRESSURE: 30.2 MMHG
SCAN RESULT: NORMAL
TRICUSPID ANNULAR PLANE SYSTOLIC EXCURSION: 2.5 CM

## 2025-01-15 PROCEDURE — 1036F TOBACCO NON-USER: CPT | Performed by: INTERNAL MEDICINE

## 2025-01-15 PROCEDURE — 99215 OFFICE O/P EST HI 40 MIN: CPT | Performed by: INTERNAL MEDICINE

## 2025-01-15 PROCEDURE — 3077F SYST BP >= 140 MM HG: CPT | Performed by: INTERNAL MEDICINE

## 2025-01-15 PROCEDURE — 3078F DIAST BP <80 MM HG: CPT | Performed by: INTERNAL MEDICINE

## 2025-01-15 PROCEDURE — 93306 TTE W/DOPPLER COMPLETE: CPT | Performed by: INTERNAL MEDICINE

## 2025-01-15 PROCEDURE — 1159F MED LIST DOCD IN RCRD: CPT | Performed by: INTERNAL MEDICINE

## 2025-01-15 PROCEDURE — 1157F ADVNC CARE PLAN IN RCRD: CPT | Performed by: INTERNAL MEDICINE

## 2025-01-15 PROCEDURE — 93306 TTE W/DOPPLER COMPLETE: CPT

## 2025-01-15 PROCEDURE — 1160F RVW MEDS BY RX/DR IN RCRD: CPT | Performed by: INTERNAL MEDICINE

## 2025-01-15 PROCEDURE — 99215 OFFICE O/P EST HI 40 MIN: CPT | Mod: 25 | Performed by: INTERNAL MEDICINE

## 2025-01-15 RX ORDER — ISOSORBIDE MONONITRATE 60 MG/1
60 TABLET, EXTENDED RELEASE ORAL DAILY
Qty: 90 TABLET | Refills: 3 | Status: SHIPPED | OUTPATIENT
Start: 2025-01-15 | End: 2026-01-10

## 2025-01-15 NOTE — PROGRESS NOTES
Chief Complaint:   Coronary Artery Disease     History of Present Illness     Tracy Santoyo is a 73 y.o. female presenting with coronary artery disease.  Tracy initially presented with angina and has been treated with anti-anginal medical therapy for MVD by [facility] 3/15/24.  She has not undergone revascularization.  The patient is tolerating guideline-directed medical therapy with antiplatelet and statin medication and is compliant.  Since last OV 10/2/24 no further palpitations.  She has been having angina relieved with SL NTG despite increased Imdur.  Had orthopnea better with increase Imdur.  Fatigue and JOVEL.     Review of symptoms  All pertinent systems have been reviewed and are negative except for what is stated in the history of present illness.     All other systems have been reviewed and are negative and noncontributory to this patient's current ailments.       Previous History     Past Medical History:  She has a past medical history of Agatston coronary artery calcium score between 200 and 399 (02/12/2024), Coronary artery disease involving native coronary artery of native heart without angina pectoris (04/02/2024), JOVEL (dyspnea on exertion) (01/15/2025), Encounter for fitting and adjustment of other specified devices (08/23/2022), Encounter for fitting and adjustment of other specified devices (12/02/2019), Essential hypertension (04/02/2024), Hypercholesteremia (04/02/2024), Hypertension, Other chest pain (02/12/2024), PAC (premature atrial contraction) (01/15/2025), Personal history of other diseases of the nervous system and sense organs (01/15/2019), and Presence of urogenital implants (09/06/2022).    Past Surgical History:  She has a past surgical history that includes Other surgical history (01/15/2019); Other surgical history (01/15/2019); Other surgical history (01/15/2019); Other surgical history (01/15/2019); Other surgical history (01/15/2019); Other surgical history (01/15/2019); Other  surgical history (01/15/2019); Other surgical history (10/18/2022); and Cardiac catheterization (N/A, 3/15/2024).      Social History:  She reports that she has never smoked. She has never used smokeless tobacco. She reports that she does not drink alcohol and does not use drugs.    Family History:  No family history on file.     Allergies:  Carbamazepine and Adhesive tape-silicones    Outpatient Medications:  Current Outpatient Medications   Medication Instructions   • aspirin 81 mg, Daily   • co-enzyme Q-10 50 mg capsule 1 capsule, 2 times daily   • cyanocobalamin (Vitamin B-12) 500 mcg tablet Take by mouth.   • dilTIAZem CD (CARDIZEM CD) 240 mg, oral, Daily RT   • estradiol (Estrace) 0.01 % (0.1 mg/gram) vaginal cream Insert into the vagina.   • evening primrose oil 500 mg 1 capsule, Daily   • isosorbide mononitrate ER (IMDUR) 60 mg, oral, Daily, Do not crush or chew.   • levothyroxine (SYNTHROID, LEVOXYL) 125 mcg, Daily RT   • magnesium 250 mg, 2 times daily   • multivitamin tablet 1 tablet, Daily   • nitrofurantoin (MACRODANTIN) 50 mg   • nitroglycerin (NITROSTAT) 0.3 mg, sublingual, Every 5 min PRN   • polyethylene glycol (Miralax) 17 gram/dose powder Take by mouth.   • rosuvastatin (CRESTOR) 20 mg, oral, Daily       Physical Examination   Vitals:  Visit Vitals  /66 (BP Location: Right arm, Patient Position: Sitting, BP Cuff Size: Adult)   Pulse 60   Wt 72.6 kg (160 lb)   LMP  (LMP Unknown)   BMI 25.82 kg/m²   OB Status Postmenopausal   Smoking Status Never   BSA 1.84 m²    Physical Exam  Vitals reviewed.   Constitutional:       General: She is not in acute distress.     Appearance: Normal appearance.   HENT:      Head: Normocephalic and atraumatic.      Nose: Nose normal.   Eyes:      Conjunctiva/sclera: Conjunctivae normal.   Cardiovascular:      Rate and Rhythm: Normal rate and regular rhythm.      Pulses: Normal pulses.      Heart sounds: No murmur heard.  Pulmonary:      Effort: Pulmonary effort is  normal. No respiratory distress.      Breath sounds: Normal breath sounds. No wheezing, rhonchi or rales.   Abdominal:      General: Bowel sounds are normal. There is no distension.      Palpations: Abdomen is soft.      Tenderness: There is no abdominal tenderness.   Musculoskeletal:         General: No swelling.      Right lower leg: No edema.      Left lower leg: No edema.   Skin:     General: Skin is warm and dry.      Capillary Refill: Capillary refill takes less than 2 seconds.   Neurological:      General: No focal deficit present.      Mental Status: She is alert.   Psychiatric:         Mood and Affect: Mood normal.           Labs/Imaging/Cardiac Studies   I have personally reviewed the patient's available lab work, primary care appointment notes, pertinent imaging studies, and cardiac studies and have discussed them and my independent interpretation of those results with the patient and caregiver at this appointment.  All pertinent recent Emergency Department evaluations and Hospital admissions were also reviewed in detail with the patient and caregiver.    Reviewed cath, Echo, Labs, Holter (9% PAC's)    Echo:  Transthoracic echo (TTE) complete    Result Date: 4/2/2024        [facility] [id number]        Tel [phone] and Fax [phone] TRANSTHORACIC ECHOCARDIOGRAM REPORT  Patient Name:      MANE Holm Physician:    Jean Carlos Islas MD Study Date:        4/2/2024             Ordering Provider:    Jean Carlos ISLAS MRN/PID:           [id number]             Fellow: Accession#:        [id number]         Nurse: Date of Birth/Age: 1950 / 73      Sonographer:          Jacy Amador ACS,                    years                                      RDCS Gender:            F                    Additional  Staff: Height:            167.64 cm            Admit Date:           4/2/2024 Weight:            68.04 kg             Admission Status:     Outpatient BSA / BMI:         1.77 m2 / 24.21      Encounter#:           [id number]                    kg/m2                                         Department Location:  [facility] Blood Pressure: 120 /65 mmHg Study Type:    TRANSTHORACIC ECHO (TTE) COMPLETE Diagnosis/ICD: Atherosclerotic heart disease of native coronary artery without                angina pectoris-I25.10 Indication:    CAD CPT Code:      Echo Complete w Full Doppler-06007  Study Detail: The following Echo studies were performed: M-Mode, 2D, Doppler and               color flow. Image quality for this study is suboptimal.               Technically challenging study due to body habitus. A bubble study               was not performed.  PHYSICIAN INTERPRETATION: Left Ventricle: The left ventricular systolic function is normal. There are no regional wall motion abnormalities. The left ventricular cavity size is normal. There is moderate asymmetric left ventricular hypertrophy involving the septal wall. Spectral Doppler shows an impaired relaxation pattern of left ventricular diastolic filling. Left Atrium: The left atrium is normal in size. Right Ventricle: The right ventricle is normal in size. There is normal right ventricular global systolic function. Right Atrium: The right atrium is normal in size. Aortic Valve: The aortic valve appears structurally normal. There is no evidence of aortic valve regurgitation. The peak instantaneous gradient of the aortic valve is 6.9 mmHg. Mitral Valve: The mitral valve is mildly thickened. There is mild mitral valve regurgitation. Tricuspid Valve: The tricuspid valve is structurally normal. There is mild tricuspid regurgitation. Pulmonic Valve: The pulmonic valve is structurally normal. There is mild pulmonic valve regurgitation. Pericardium: There is no pericardial  effusion noted. Aorta: The aortic root is normal. In comparison to the previous echocardiogram(s): There are no prior studies on this patient for comparison purposes.  CONCLUSIONS:  1. Left ventricular systolic function is normal.  2. Spectral Doppler shows an impaired relaxation pattern of left ventricular diastolic filling.  3. There is moderate asymmetric left ventricular hypertrophy. QUANTITATIVE DATA SUMMARY: 2D MEASUREMENTS:                           Normal Ranges: LAs:           3.60 cm    (2.7-4.0cm) RVIDd:         1.92 cm    (0.9-3.6cm) IVSd:          1.50 cm    (0.6-1.1cm) LVPWd:         1.18 cm    (0.6-1.1cm) LVIDd:         4.17 cm    (3.9-5.9cm) LVIDs:         2.55 cm LV Mass Index: 117.5 g/m2 LV % FS        38.8 % LA VOLUME:                               Normal Ranges: LA Vol A4C:        20.5 ml    (22+/-6mL/m2) LA Vol A2C:        39.6 ml LA Vol BP:         31.0 ml LA Vol Index A4C:  11.6 ml/m2 LA Vol Index A2C:  22.4 ml/m2 LA Vol Index BP:   17.5 ml/m2 LA Area A4C:       9.9 cm2 LA Area A2C:       14.9 cm2 LA Major Axis A4C: 4.0 cm LA Major Axis A2C: 4.8 cm LA Vol A4C:        20.5 ml LA Vol A2C:        36.7 ml RA VOLUME BY A/L METHOD:                               Normal Ranges: RA Vol A4C:        20.1 ml    (8.3-19.5ml) RA Vol Index A4C:  11.4 ml/m2 RA Area A4C:       10.5 cm2 RA Major Axis A4C: 4.7 cm LV SYSTOLIC FUNCTION BY 2D PLANIMETRY (MOD):                     Normal Ranges: EF-A4C View: 64.0 % (>=55%) EF-A2C View: 63.0 % EF-Biplane:  62.9 % LV DIASTOLIC FUNCTION:                           Normal Ranges: MV Peak E:    0.60 m/s    (0.7-1.2 m/s) MV Peak A:    0.68 m/s    (0.42-0.7 m/s) E/A Ratio:    0.88        (1.0-2.2) MV e'         0.07 m/s    (>8.0) MV lateral e' 0.06 m/s MV medial e'  0.07 m/s MV A Dur:     140.82 msec E/e' Ratio:   8.51        (<8.0) MITRAL VALVE:                 Normal Ranges: MV DT: 290 msec (150-240msec) AORTIC VALVE:                         Normal Ranges: AoV Vmax:       1.31 m/s (<=1.7m/s) AoV Peak P.9 mmHg (<20mmHg) LVOT Max Julio:  0.97 m/s (<=1.1m/s) LVOT VTI:      18.70 cm LVOT Diameter: 2.07 cm  (1.8-2.4cm) AoV Area,Vmax: 2.50 cm2 (2.5-4.5cm2)  RIGHT VENTRICLE: RV Basal 2.69 cm RV Mid   1.92 cm RV Major 5.6 cm TAPSE:   24.1 mm RV s'    0.11 m/s TRICUSPID VALVE/RVSP:                             Normal Ranges: Peak TR Velocity: 2.27 m/s Est. RA Pressure: 3 mmHg RV Syst Pressure: 23.7 mmHg (< 30mmHg) IVC Diam:         0.94 cm PULMONIC VALVE:                      Normal Ranges: PV Max Julio: 1.0 m/s  (0.6-0.9m/s) PV Max P.2 mmHg AORTA: Asc Ao Diam 3.18 cm  [id number] Wood Turner MD Electronically signed on 2024 at 10:41:19 AM  ** Final **         Assessment and Recommendations     Assessment/Plan       1. PAC (premature atrial contraction) (Primary)  ASX on diltiazem.    2. Mixed hyperlipidemia  The patient's lipids are well controlled on chronic rosuvastatin therapy and they are meeting their goal LDL cholesterol per the ACC/AHA guidelines.      3. Essential hypertension  The patient's blood pressure has been well-controlled at today's appointment or by recent primary care provider's measurements/home measurements and meets their goal blood pressure per the ACC/AHA guidelines.  The patient has been compliant with their anti-hypertensive medications and is following a low sodium/DASH diet. I advised continuation of their present medical treatment and lifestyle modification.      4. JOVEL (dyspnea on exertion)  BNP normal.  Likely this is an anginal equivalent (along with her chest discomfort).    5. Coronary artery disease involving native coronary artery of native heart with angina pectoris  Severe calcific MVD not particularly amenable to PCI with angina refractory to medical Rx.  CTS consultation for CABG. D/W patient.           Wood Turner MD    Exclusive of any other services or procedures performed, I, Wood Turner MD , spent 30 minutes in duration for this visit  today.  This time consisted of chart review, obtaining history, and/or performing the exam as documented above as well as documenting the clinical information for the encounter in the electronic record, discussing treatment options, plans, and/or goals with patient, family, and/or caregiver, refilling medications, updating the electronic record, ordering medicines, lab work, imaging, referrals, and/or procedures as documented above and communicating with other [facility] professionals. I have discussed the results of laboratory, radiology, and cardiology studies with the patient and their family/caregiver.

## 2025-01-20 ENCOUNTER — PREP FOR PROCEDURE (OUTPATIENT)
Dept: OPERATING ROOM | Facility: HOSPITAL | Age: 75
End: 2025-01-20

## 2025-01-20 ENCOUNTER — OFFICE VISIT (OUTPATIENT)
Dept: CARDIAC SURGERY | Facility: HOSPITAL | Age: 75
End: 2025-01-20
Payer: MEDICARE

## 2025-01-20 VITALS
HEART RATE: 76 BPM | BODY MASS INDEX: 25.55 KG/M2 | WEIGHT: 159 LBS | SYSTOLIC BLOOD PRESSURE: 133 MMHG | DIASTOLIC BLOOD PRESSURE: 62 MMHG | HEIGHT: 66 IN | OXYGEN SATURATION: 95 %

## 2025-01-20 DIAGNOSIS — I25.118 CORONARY ARTERY DISEASE OF NATIVE ARTERY OF NATIVE HEART WITH STABLE ANGINA PECTORIS: Primary | ICD-10-CM

## 2025-01-20 DIAGNOSIS — I25.10 CAD IN NATIVE ARTERY: Primary | ICD-10-CM

## 2025-01-20 DIAGNOSIS — R82.90 ABNORMAL URINE FINDING: ICD-10-CM

## 2025-01-20 DIAGNOSIS — R07.9 CHEST PAIN, UNSPECIFIED TYPE: ICD-10-CM

## 2025-01-20 PROCEDURE — 3075F SYST BP GE 130 - 139MM HG: CPT | Performed by: THORACIC SURGERY (CARDIOTHORACIC VASCULAR SURGERY)

## 2025-01-20 PROCEDURE — 1157F ADVNC CARE PLAN IN RCRD: CPT | Performed by: THORACIC SURGERY (CARDIOTHORACIC VASCULAR SURGERY)

## 2025-01-20 PROCEDURE — 99215 OFFICE O/P EST HI 40 MIN: CPT | Performed by: THORACIC SURGERY (CARDIOTHORACIC VASCULAR SURGERY)

## 2025-01-20 PROCEDURE — 99205 OFFICE O/P NEW HI 60 MIN: CPT | Performed by: THORACIC SURGERY (CARDIOTHORACIC VASCULAR SURGERY)

## 2025-01-20 PROCEDURE — 3078F DIAST BP <80 MM HG: CPT | Performed by: THORACIC SURGERY (CARDIOTHORACIC VASCULAR SURGERY)

## 2025-01-20 NOTE — PROGRESS NOTES
Subjective   Tracy Santoyo is a 74 y.o. female referred by [facility] for coronary bypass grafting. She reports chest pain, exertional chest pressure/discomfort, and fatigue. She denies paroxysmal nocturnal dyspnea and syncope.      Past Medical History:   Diagnosis Date    Agatston coronary artery calcium score between 200 and 399 02/12/2024    APB=011 (3/21/23)    Coronary artery disease involving native coronary artery of native heart without angina pectoris 04/02/2024    LHC (3/15/24) 70% mid LAD, 70% OM, 80% D1    JOVEL (dyspnea on exertion) 01/15/2025    Encounter for fitting and adjustment of other specified devices 08/23/2022    Fitting and adjustment of pessary    Encounter for fitting and adjustment of other specified devices 12/02/2019    Fitting and adjustment of pessary    Essential hypertension 04/02/2024    Hypercholesteremia 04/02/2024    Hypertension     Other chest pain 02/12/2024    PAC (premature atrial contraction) 01/15/2025    Personal history of other diseases of the nervous system and sense organs 01/15/2019    History of trigeminal neuralgia    Presence of urogenital implants 09/06/2022    Vaginal pessary present     Past Surgical History:   Procedure Laterality Date    CARDIAC CATHETERIZATION N/A 3/15/2024    Procedure: Left Heart Cath with Coronary Angiography and LV;  Surgeon: Wood Turner MD;  Location: [facility] Cardiac Cath Lab;  Service: Cardiovascular;  Laterality: N/A;    OTHER SURGICAL HISTORY  01/15/2019    Abdominoplasty    OTHER SURGICAL HISTORY  01/15/2019    Cholecystectomy    OTHER SURGICAL HISTORY  01/15/2019    Urethropexy    OTHER SURGICAL HISTORY  01/15/2019    Hysterectomy    OTHER SURGICAL HISTORY  01/15/2019    Liver surgery    OTHER SURGICAL HISTORY  01/15/2019    Sinus surgery    OTHER SURGICAL HISTORY  01/15/2019    Craniotomy    OTHER SURGICAL HISTORY  10/18/2022    Sacrospinous fixation of vaginal vault     No family history on file.    Allergies   Allergen Reactions     Carbamazepine Other     Myalgia- generalized weakness    Adhesive Tape-Silicones Itching and Hives         Current Outpatient Medications:     aspirin 81 mg EC tablet, Take 1 tablet (81 mg) by mouth once daily., Disp: , Rfl:     co-enzyme Q-10 50 mg capsule, Take 1 capsule (50 mg) by mouth 2 times a day., Disp: , Rfl:     cyanocobalamin (Vitamin B-12) 500 mcg tablet, Take by mouth., Disp: , Rfl:     dilTIAZem CD (Cardizem CD) 240 mg 24 hr capsule, Take 1 capsule (240 mg) by mouth once daily., Disp: 90 capsule, Rfl: 3    estradiol (Estrace) 0.01 % (0.1 mg/gram) vaginal cream, Insert into the vagina., Disp: , Rfl:     evening primrose oil 500 mg, Take 1 capsule (500 mg) by mouth once daily., Disp: , Rfl:     isosorbide mononitrate ER (Imdur) 60 mg 24 hr tablet, Take 1 tablet (60 mg) by mouth once daily. Do not crush or chew., Disp: 90 tablet, Rfl: 3    levothyroxine (Synthroid, Levoxyl) 125 mcg tablet, Take 1 tablet (125 mcg) by mouth once daily., Disp: , Rfl:     magnesium 250 mg tablet, Take 1 tablet (250 mg) by mouth twice a day., Disp: , Rfl:     multivitamin tablet, Take 1 tablet by mouth once daily., Disp: , Rfl:     nitrofurantoin (Macrodantin) 50 mg capsule, 1 capsule (50 mg) by Enteral route., Disp: , Rfl:     nitroglycerin (Nitrostat) 0.3 mg SL tablet, Place 1 tablet (0.3 mg) under the tongue every 5 minutes if needed for chest pain (up to 3)., Disp: 30 tablet, Rfl: 2    polyethylene glycol (Miralax) 17 gram/dose powder, Take by mouth., Disp: , Rfl:     rosuvastatin (Crestor) 20 mg tablet, Take 1 tablet (20 mg) by mouth once daily., Disp: 30 tablet, Rfl: 11    Review of systems negative .       Objective   Cardiac Studies  Cardiac catheterization revealed anterior ischemia.    EF: >50%  Vessels: Double vessel disease: LAD and Circumflex    Physical Exam  General: She is a pleasant female currently in no distress.  LMP  (LMP Unknown)    There is no height or weight on file to calculate BMI.   HEENT:  Normocephalic and atraumatic. PERRLA. EOMs are full. Dentition is unremarkable.  NECK: Supple without thyromegaly, masses, or carotid bruits.  CHEST: Clear.  HEART: Regular rate and rhythm.  ABDOMEN: Soft, flat, nontender without organomegaly or masses.  NEUROLOGIC: Unremarkable.  EXTREMITIES: Unremarkable. Pedal pulses are palpable.    Data Review: Echocardiogram:  normal LV function, no valvular abnormalities, no significant pericardial effusion      Assessment/Plan   Tracy is a 74-year-old female that was sent to our outpatient clinic after being diagnosed with symptomatic double vessel disease from a cath done almost a year ago.  The patient have a long history of coronary artery disease leading to a left heart cath done in March of last year.  At that time double vessel disease with proximal LAD and circumflex was diagnosed, she was put on medical treatment and did well for almost a year until last December when she started having several episodes of chest pain that were relieved with nitroglycerin.  I think she have a good indication for coronary artery bypass surgery, in terms of symptom relief and prognosis.  Because the last cath was done almost a year ago and her symptoms worsen, I think a new cath should be performed before any surgery    Interim of surgery I think this to option for her, the first 1 will be a full sternotomy and complete revascularization according to the new cath..  The problem with that is at the patient had a previous surgery for vein varicose vein that we need to address in terms of the coronary available.  The second option for her will be the probably hybrid approach with a robotic LIMA to LAD and with possible stenting of the circumflex coronary if the anatomy of the disease is similar to a year ago.  Because she is quite symptomatic I would like to perform the surgery at the beginning of next month.  We will contact his cardiology office so we can perform a new left heart  cath.  We will order the preop test and will we will book tentatively the surgery for February 7.  Problem List Items Addressed This Visit    None      No orders of the defined types were placed in this encounter.

## 2025-01-24 ENCOUNTER — HOSPITAL ENCOUNTER (OUTPATIENT)
Facility: HOSPITAL | Age: 75
Setting detail: OUTPATIENT SURGERY
Discharge: HOME | End: 2025-01-24
Attending: INTERNAL MEDICINE | Admitting: INTERNAL MEDICINE
Payer: MEDICARE

## 2025-01-24 VITALS
WEIGHT: 160 LBS | OXYGEN SATURATION: 94 % | RESPIRATION RATE: 16 BRPM | SYSTOLIC BLOOD PRESSURE: 104 MMHG | HEIGHT: 66 IN | HEART RATE: 68 BPM | TEMPERATURE: 97.7 F | BODY MASS INDEX: 25.71 KG/M2 | DIASTOLIC BLOOD PRESSURE: 59 MMHG

## 2025-01-24 DIAGNOSIS — I25.118 CORONARY ARTERY DISEASE OF NATIVE ARTERY OF NATIVE HEART WITH STABLE ANGINA PECTORIS: Primary | ICD-10-CM

## 2025-01-24 DIAGNOSIS — I25.112 ATHEROSCLEROTIC HEART DISEASE OF NATIVE CORONARY ARTERY WITH REFRACTORY ANGINA PECTORIS: ICD-10-CM

## 2025-01-24 PROCEDURE — 93458 L HRT ARTERY/VENTRICLE ANGIO: CPT | Performed by: INTERNAL MEDICINE

## 2025-01-24 PROCEDURE — 99152 MOD SED SAME PHYS/QHP 5/>YRS: CPT | Performed by: INTERNAL MEDICINE

## 2025-01-24 PROCEDURE — C1894 INTRO/SHEATH, NON-LASER: HCPCS | Performed by: INTERNAL MEDICINE

## 2025-01-24 PROCEDURE — C1760 CLOSURE DEV, VASC: HCPCS | Performed by: INTERNAL MEDICINE

## 2025-01-24 PROCEDURE — 2500000005 HC RX 250 GENERAL PHARMACY W/O HCPCS: Performed by: INTERNAL MEDICINE

## 2025-01-24 PROCEDURE — C1769 GUIDE WIRE: HCPCS | Performed by: INTERNAL MEDICINE

## 2025-01-24 PROCEDURE — 7100000009 HC PHASE TWO TIME - INITIAL BASE CHARGE: Performed by: INTERNAL MEDICINE

## 2025-01-24 PROCEDURE — 7100000010 HC PHASE TWO TIME - EACH INCREMENTAL 1 MINUTE: Performed by: INTERNAL MEDICINE

## 2025-01-24 PROCEDURE — 99222 1ST HOSP IP/OBS MODERATE 55: CPT | Performed by: NURSE PRACTITIONER

## 2025-01-24 PROCEDURE — 2720000007 HC OR 272 NO HCPCS: Performed by: INTERNAL MEDICINE

## 2025-01-24 PROCEDURE — 2500000004 HC RX 250 GENERAL PHARMACY W/ HCPCS (ALT 636 FOR OP/ED): Performed by: INTERNAL MEDICINE

## 2025-01-24 RX ORDER — NAPROXEN SODIUM 220 MG/1
81 TABLET, FILM COATED ORAL ONCE
Status: DISCONTINUED | OUTPATIENT
Start: 2025-01-24 | End: 2025-01-24 | Stop reason: HOSPADM

## 2025-01-24 RX ORDER — HEPARIN SODIUM 1000 [USP'U]/ML
INJECTION, SOLUTION INTRAVENOUS; SUBCUTANEOUS AS NEEDED
Status: DISCONTINUED | OUTPATIENT
Start: 2025-01-24 | End: 2025-01-24 | Stop reason: HOSPADM

## 2025-01-24 RX ORDER — VERAPAMIL HYDROCHLORIDE 2.5 MG/ML
INJECTION, SOLUTION INTRAVENOUS AS NEEDED
Status: DISCONTINUED | OUTPATIENT
Start: 2025-01-24 | End: 2025-01-24 | Stop reason: HOSPADM

## 2025-01-24 RX ORDER — ACETAMINOPHEN 160 MG/5ML
650 SOLUTION ORAL EVERY 6 HOURS PRN
Status: DISCONTINUED | OUTPATIENT
Start: 2025-01-24 | End: 2025-01-24 | Stop reason: HOSPADM

## 2025-01-24 RX ORDER — ACETAMINOPHEN 325 MG/1
650 TABLET ORAL EVERY 6 HOURS PRN
Status: DISCONTINUED | OUTPATIENT
Start: 2025-01-24 | End: 2025-01-24 | Stop reason: HOSPADM

## 2025-01-24 RX ORDER — MIDAZOLAM HYDROCHLORIDE 1 MG/ML
INJECTION, SOLUTION INTRAMUSCULAR; INTRAVENOUS AS NEEDED
Status: DISCONTINUED | OUTPATIENT
Start: 2025-01-24 | End: 2025-01-24 | Stop reason: HOSPADM

## 2025-01-24 RX ORDER — LIDOCAINE HYDROCHLORIDE 10 MG/ML
INJECTION, SOLUTION EPIDURAL; INFILTRATION; INTRACAUDAL; PERINEURAL AS NEEDED
Status: DISCONTINUED | OUTPATIENT
Start: 2025-01-24 | End: 2025-01-24 | Stop reason: HOSPADM

## 2025-01-24 RX ORDER — FENTANYL CITRATE 50 UG/ML
INJECTION, SOLUTION INTRAMUSCULAR; INTRAVENOUS AS NEEDED
Status: DISCONTINUED | OUTPATIENT
Start: 2025-01-24 | End: 2025-01-24 | Stop reason: HOSPADM

## 2025-01-24 RX ORDER — ACETAMINOPHEN 650 MG/1
650 SUPPOSITORY RECTAL EVERY 6 HOURS PRN
Status: DISCONTINUED | OUTPATIENT
Start: 2025-01-24 | End: 2025-01-24 | Stop reason: HOSPADM

## 2025-01-24 ASSESSMENT — PAIN SCALES - GENERAL
PAINLEVEL_OUTOF10: 0 - NO PAIN

## 2025-01-24 ASSESSMENT — ENCOUNTER SYMPTOMS
ENDOCRINE NEGATIVE: 1
SHORTNESS OF BREATH: 1
HEMATOLOGIC/LYMPHATIC NEGATIVE: 1
ALLERGIC/IMMUNOLOGIC NEGATIVE: 1
MUSCULOSKELETAL NEGATIVE: 1
EYES NEGATIVE: 1
PSYCHIATRIC NEGATIVE: 1
GASTROINTESTINAL NEGATIVE: 1
CONSTITUTIONAL NEGATIVE: 1
NEUROLOGICAL NEGATIVE: 1

## 2025-01-24 ASSESSMENT — COLUMBIA-SUICIDE SEVERITY RATING SCALE - C-SSRS
6. HAVE YOU EVER DONE ANYTHING, STARTED TO DO ANYTHING, OR PREPARED TO DO ANYTHING TO END YOUR LIFE?: NO
2. HAVE YOU ACTUALLY HAD ANY THOUGHTS OF KILLING YOURSELF?: NO
1. IN THE PAST MONTH, HAVE YOU WISHED YOU WERE DEAD OR WISHED YOU COULD GO TO SLEEP AND NOT WAKE UP?: NO

## 2025-01-24 ASSESSMENT — PAIN - FUNCTIONAL ASSESSMENT
PAIN_FUNCTIONAL_ASSESSMENT: 0-10

## 2025-01-24 NOTE — NURSING NOTE
Home going instructions specific to procedure given. Easily arousable; responding appropriately. Vs +/- 20% of pre procedure status. Significant complications are absent. Ambulates without dizziness/age appropriate activity, pulse ox above or equal to 92% on room air/ordered oxygen treatment. Care Plan Complete. Discharge to home accompanied by daughter and . Discharged via wheelchair.

## 2025-01-24 NOTE — H&P
History Of Present Illness  Tracy Santoyo is a 74 y.o. female presenting with CAD, here for repeat LHC as part of possible CABG workup. Last LHC in 3/2024 revealed 2VD: pLAD and Circumflex. PMHx significant for HTN, hypothyroidism, CAD.     Past Medical History:  Past Medical History:   Diagnosis Date    Agatston coronary artery calcium score between 200 and 399 02/12/2024    RXK=173 (3/21/23)    Coronary artery disease involving native coronary artery of native heart without angina pectoris 04/02/2024    LHC (3/15/24) 70% mid LAD, 70% OM, 80% D1    JOVEL (dyspnea on exertion) 01/15/2025    Encounter for fitting and adjustment of other specified devices 08/23/2022    Fitting and adjustment of pessary    Encounter for fitting and adjustment of other specified devices 12/02/2019    Fitting and adjustment of pessary    Essential hypertension 04/02/2024    Hypercholesteremia 04/02/2024    Hypertension     Other chest pain 02/12/2024    PAC (premature atrial contraction) 01/15/2025    Personal history of other diseases of the nervous system and sense organs 01/15/2019    History of trigeminal neuralgia    Presence of urogenital implants 09/06/2022    Vaginal pessary present        Past Surgical History:  Past Surgical History:   Procedure Laterality Date    CARDIAC CATHETERIZATION N/A 3/15/2024    Procedure: Left Heart Cath with Coronary Angiography and LV;  Surgeon: Wood Turner MD;  Location: [facility] Lab;  Service: Cardiovascular;  Laterality: N/A;    OTHER SURGICAL HISTORY  01/15/2019    Abdominoplasty    OTHER SURGICAL HISTORY  01/15/2019    Cholecystectomy    OTHER SURGICAL HISTORY  01/15/2019    Urethropexy    OTHER SURGICAL HISTORY  01/15/2019    Hysterectomy    OTHER SURGICAL HISTORY  01/15/2019    Liver surgery    OTHER SURGICAL HISTORY  01/15/2019    Sinus surgery    OTHER SURGICAL HISTORY  01/15/2019    Craniotomy    OTHER SURGICAL HISTORY  10/18/2022    Sacrospinous fixation of vaginal vault           Social History:   reports that she has never smoked. She has never used smokeless tobacco. She reports that she does not drink alcohol and does not use drugs.     Family History:  No family history on file.     Allergies:  Allergies   Allergen Reactions    Carbamazepine Other     Myalgia- generalized weakness    Adhesive Tape-Silicones Itching and Hives        Home Medications:  Current Outpatient Medications   Medication Instructions    aspirin 81 mg, Daily    cyanocobalamin (VITAMIN B-12) 500 mcg, Weekly    dilTIAZem CD (CARDIZEM CD) 240 mg, oral, Daily RT    estradiol (Estrace) 0.01 % (0.1 mg/gram) vaginal cream Insert into the vagina.    evening primrose oil 500 mg 1 capsule, Daily    isosorbide mononitrate ER (IMDUR) 60 mg, oral, Daily, Do not crush or chew.    levothyroxine (SYNTHROID, LEVOXYL) 125 mcg, Daily RT    multivitamin tablet 1 tablet, 3 times weekly    nitrofurantoin (MACRODANTIN) 50 mg, Every other day    nitroglycerin (NITROSTAT) 0.3 mg, sublingual, Every 5 min PRN    polyethylene glycol (Miralax) 17 gram/dose powder Take by mouth.    rosuvastatin (CRESTOR) 20 mg, oral, Daily       Inpatient Medications:  Scheduled medications   Medication Dose Route Frequency    aspirin  81 mg oral Once     PRN medications   Medication     Continuous Medications   Medication Dose Last Rate         Review of Systems   Constitutional: Negative.    HENT: Negative.     Eyes: Negative.    Respiratory:  Positive for shortness of breath.    Cardiovascular:  Positive for chest pain.   Gastrointestinal: Negative.    Endocrine: Negative.    Genitourinary: Negative.    Musculoskeletal: Negative.    Skin: Negative.    Allergic/Immunologic: Negative.    Neurological: Negative.    Hematological: Negative.    Psychiatric/Behavioral: Negative.            Physical Exam  General:  Patient is awake, alert, and oriented.  Patient is in no acute distress.  HEENT:  Pupils equal and reactive.  Normocephalic.  Moist mucosa.    Neck:   "No JVD.   Cardiovascular:  Regular rate and rhythm.  Normal S1 and S2. No murmurs/rubs/gallops. Radial pulses 2+.   Pulmonary:  Clear to auscultation bilaterally.  Abdomen:  Soft. Non-tender.   Non-distended.  Positive bowel sounds.  Lower Extremities:  Pedal pulses 2+ No LE edema.  Neurologic:  Cranial nerves II-XII grossly intact.   No focal deficit.   Skin: Skin warm and dry, no lesions. Normal skin turgor.   Psychiatric: Normal affect.     Sedation Plan    ASA 3     Mallampati class: III.    Risks, benefits, and alternatives discussed with patient.         NPO since 0000    Last Recorded Vitals  Blood pressure 149/71, pulse 71, temperature 36.5 °C (97.7 °F), temperature source Oral, resp. rate 16, height 1.676 m (5' 6\"), weight 72.6 kg (160 lb), SpO2 96%, not currently breastfeeding.         Vitals from the Past 24 Hours  Heart Rate:  [71]   Temp:  [36.5 °C (97.7 °F)]   Resp:  [16]   BP: (149)/(71)   Height:  [167.6 cm (5' 6\")]   Weight:  [72.6 kg (160 lb)]   SpO2:  [96 %]          Relevant Results    Labs    POCT Glucose:      POCT INR:     POCT Urine Pregnancy:       CBC:   Recent Labs     01/10/25  0844 03/15/24  0737 02/09/24  1506 01/29/23  1801 11/01/22  0819 09/27/22  1214   WBC 6.7 6.4 11.0 7.5 7.5 7.7   HGB 14.4 14.5 14.5 13.0 13.3 13.5   HCT 42.8 41.9 42.7 38.7 40.3 40.7    276 238 235 307 289   MCV 91 91 90 89 93 90     BMP/CMP:   Recent Labs     01/06/25  1430 09/27/24  0806 05/24/24  0753 03/15/24  0912 03/15/24  0737 02/09/24  1506 01/29/23  1801 11/01/22  0819     --   --  140 141 141 138 140   K 4.0  --   --  3.8 3.6 4.0 3.5 3.8     --   --  106 104 106 104 106   BUN 13  --   --  11 12 14 13 12   CREATININE 0.81  --   --  0.65 0.83 0.80 0.73 0.67   CO2 29  --   --  23 27 27 25 26   CALCIUM 9.4  --   --  8.6 9.4 9.6 9.3 9.1   PROT  --  6.9 6.9 6.1*  --  7.4 6.5 6.7   BILITOT  --  0.6 0.6 0.7  --  0.9 0.6 0.7   ALKPHOS  --  61 66 63  --  77 50 57   ALT  --  40 20 14  --  192* 23 " "18   AST  --  29 17 14  --  351* 39 16   GLUCOSE 81  --   --  96 99 99 93 84      Magnesium: No results for input(s): \"MG\" in the last 73194 hours.  Lipid Panel:   Recent Labs     09/27/24  0806 05/24/24  0753 11/01/22  0819 04/26/22  0756 01/14/22  0912 07/29/21  1405   CHOL 140 143 222* 248* 274* 218*   HDL 58.2 59.4 48.8 53.2 54.7 44.8   CHHDL 2.4 2.4 4.5 4.7 5.0 4.9   VLDL 15 14 27 29 29 50*   TRIG 74 68 135 144 146 249*   NHDL 82 84  --   --   --  173     Cardiac       No lab exists for component: \"CK\", \"CKMBP\"   Hemoglobin A1C: No results for input(s): \"HGBA1C\" in the last 19192 hours.  TSH/ Free T4:   Recent Labs     01/10/25  0844 11/01/22  0819 04/26/22  0756 01/14/22  0912   TSH 1.35 0.99 3.36 4.38*   FREET4  --  1.39*  --   --      Iron:   Recent Labs     01/06/25  1430   *     Coag:     ABO: No results found for: \"ABO\"    Past Cardiology Tests (Last 3 Years):    EKG:  Recent Labs     07/31/24  1534 02/09/24  1505 07/26/23  1529   ATRRATE 64 58 63   VENTRATE 64 59 63   PRINT 164 168 184   QRSDUR 76 101 76   QTCFRED 404 437 410   QTCCALCB 408 436 413     Encounter Date: 07/31/24   ECG 12 lead (Clinic Performed)   Result Value    Ventricular Rate 64    Atrial Rate 64    TN Interval 164    QRS Duration 76    QT Interval 396    QTC Calculation(Bazett) 408    P Axis 73    R Axis 14    T Axis 16    QRS Count 11    Q Onset 228    P Onset 146    P Offset 181    T Offset 426    QTC Fredericia 404    Narrative    Normal sinus rhythm  Nonspecific ST abnormality  Abnormal ECG  When compared with ECG of 09-FEB-2024 13:07,  No significant change since  Confirmed by Karl Zimmer ([id number]) on 8/16/2024 2:37:22 PM     Echo:  Echocardiogram:   Transthoracic Echo (TTE) Complete 01/15/2025    [facility], [address], [place] [address]  Tel [phone] and Fax [phone]    TRANSTHORACIC ECHOCARDIOGRAM " REPORT      Patient Name:       MANE MILLER      [place] Physician:    [id number] Josephine Turner MD  Study Date:         1/15/2025           Ordering Provider:    [id number] JOSEPHINE TURNER  MRN/PID:            [id number]            Fellow:  Accession#:         [id number]        Nurse:  Date of Birth/Age:  1950 / 74     Sonographer:          Lou uribe                                     [email or web address]  Gender assigned at  F                   Additional Staff:  Birth:  Height:             167.64 cm           Admit Date:           1/15/2025  Weight:             72.57 kg            Admission Status:     Outpatient  BSA / BMI:          1.82 m2 / 25.82     Encounter#:           [id number]  kg/m2  Blood Pressure:     144/66 mmHg         Department Location:  [facility]    Study Type:    TRANSTHORACIC ECHO (TTE) COMPLETE  Diagnosis/ICD: Angina pectoris, unspecified-I20.9  Indication:    Angina, Recent Onset  CPT Code:      Echo Complete w Full Doppler-[id number]  Study Detail: The following Echo studies were performed: 2D, M-Mode, Doppler and  color flow. Patient's heart rhythm is normal sinus rhythm. A  bubble study was not performed.      PHYSICIAN INTERPRETATION:  Left Ventricle: The left ventricular systolic function is normal, with a Buckley's biplane calculated ejection fraction of 66%. There are no regional left ventricular wall motion abnormalities. The left ventricular cavity size is normal. There is mildly increased septal thickness. Spectral Doppler shows a Grade I (impaired relaxation pattern) of left ventricular diastolic filling with normal left atrial filling pressure.  Left Atrium: The left atrium is normal in size. A bubble study using agitated saline was not performed.  Right Ventricle: The right ventricle is normal in size. There is normal right ventricular global systolic function.  Right Atrium: The right atrium is normal in size.  Aortic Valve: The aortic valve is trileaflet. There is evidence of mildly  elevated transaortic gradients consistent with sclerosis of the aortic valve. There is trace aortic valve regurgitation. The peak instantaneous gradient of the aortic valve is 9 mmHg.  Mitral Valve: The mitral valve is mildly thickened. There is mild mitral valve regurgitation.  Tricuspid Valve: The tricuspid valve is structurally normal. There is trace to mild tricuspid regurgitation.  Pulmonic Valve: The pulmonic valve is structurally normal. There is physiologic pulmonic valve regurgitation.  Pericardium: There is no pericardial effusion noted.  Aorta: The aortic root is normal.  In comparison to the previous echocardiogram(s): Compared with study dated 4/2/2024, no significant change.      CONCLUSIONS:  1. The left ventricular systolic function is normal, with a Buckley's biplane calculated ejection fraction of 66%.  2. Spectral Doppler shows a Grade I (impaired relaxation pattern) of left ventricular diastolic filling with normal left atrial filling pressure.  3. There is normal right ventricular global systolic function.  4. Aortic valve sclerosis.    QUANTITATIVE DATA SUMMARY:    2D MEASUREMENTS:          Normal Ranges:  LAs:             4.03 cm  (2.7-4.0cm)  RVIDd:           2.27 cm  (0.9-3.6cm)  IVSd:            1.03 cm  (0.6-1.1cm)  LVPWd:           0.95 cm  (0.6-1.1cm)  LVIDd:           4.72 cm  (3.9-5.9cm)  LVIDs:           2.95 cm  LV Mass Index:   90 g/m2  LVEDV Index:     35 ml/m2  LV % FS          37.5 %      LA VOLUME:                    Normal Ranges:  LA Vol A4C:        38.8 ml    (22+/-6mL/m2)  LA Vol A2C:        58.8 ml  LA Vol BP:         48.7 ml  LA Vol Index A4C:  21.3 ml/m2  LA Vol Index A2C:  32.3 ml/m2  LA Vol Index BP:   26.8 ml/m2  LA Area A4C:       15.4 cm2  LA Area A2C:       18.6 cm2  LA Major Axis A4C: 5.2 cm  LA Major Axis A2C: 5.0 cm  LA Vol A4C:        36.6 ml  LA Vol A2C:        55.6 ml  LA Vol Index BSA:  25.3 ml/m2      RA VOLUME BY A/L METHOD:            Normal Ranges:  RA  Vol A4C:              38.1 ml    (8.3-19.5ml)  RA Vol Index A4C:        20.9 ml/m2  RA Area A4C:             14.2 cm2  RA Major Axis A4C:       4.5 cm      LV SYSTOLIC FUNCTION BY 2D PLANIMETRY (MOD):  Normal Ranges:  EF-A4C View:    66 % (>=55%)  EF-A2C View:    66 %  EF-Biplane:     66 %  LV EF Reported: 66 %      LV DIASTOLIC FUNCTION:             Normal Ranges:  MV Peak E:             0.61 m/s    (0.7-1.2 m/s)  MV Peak A:             0.83 m/s    (0.42-0.7 m/s)  E/A Ratio:             0.73        (1.0-2.2)  MV e'                  0.063 m/s   (>8.0)  MV lateral e'          0.07 m/s  MV medial e'           0.05 m/s  MV A Dur:              106.57 msec  E/e' Ratio:            9.61        (<8.0)      MITRAL VALVE:          Normal Ranges:  MV DT:        289 msec (150-240msec)      AORTIC VALVE:           Normal Ranges:  AoV Vmax:      1.48 m/s (<=1.7m/s)  AoV Peak P.8 mmHg (<20mmHg)  LVOT Max Julio:  0.96 m/s (<=1.1m/s)  LVOT VTI:      22.74 cm  LVOT Diameter: 2.03 cm  (1.8-2.4cm)  AoV Area,Vmax: 2.08 cm2 (2.5-4.5cm2)      RIGHT VENTRICLE:  RV Basal 3.40 cm  RV Major 6.6 cm  TAPSE:   25.1 mm  RV s'    0.13 m/s      TRICUSPID VALVE/RVSP:          Normal Ranges:  Peak TR Velocity:     2.61 m/s  Est. RA Pressure:     3 mmHg  RV Syst Pressure:     30 mmHg  (< 30mmHg)      PULMONIC VALVE:          Normal Ranges:  PV Accel Time:  129 msec (>120ms)  PV Max Julio:     0.9 m/s  (0.6-0.9m/s)  PV Max PG:      3.2 mmHg      AORTA:  Asc Ao Diam 3.05 cm      [id number] Wood Turner MD  Electronically signed on 1/15/2025 at 1:01:36 PM        ** Final **    Ejection Fractions:  LV EF   Date/Time Value Ref Range Status   01/15/2025 12:12 PM 66 %      Cath:  Coronary Angiography:   Left Heart Cath with Coronary Angiography and LV 03/15/2024    [facility], Cath Lab, [address] [place] [address]    Cardiovascular Catheterization Report    Patient Name:      MANE HERRINGERIC      Performing Physician:  [id number] Muir  Yue MILLER  Study Date:        3/15/2024           Verifying Physician:   [id number] Josephine Islas MD  MRN/PID:           [id number]            Cardiologist/Co-scrub:  Accession#:        [id number]        Ordering Provider:     [id number] JOSEPHINE ISLAS  Date of Birth/Age: 1950 / 73     Fellow:  rony  Gender:            F                   Fellow:  Encounter#:        [id number]      Study: Left Heart Cath      Indications:  MANE MILLER is a 73 year old female who presents with dyslipidemia, hypertension and an anginal equivalent chest pain assessment (i.e. dyspnea on exertion believed to be from ischemia). Suspected coronary artery disease.    Procedure Description:  After infiltration with 2% Lidocaine, the right radial artery was cannulated with a modified Seldinger technique. Subsequently a 6 Vietnamese sheath was placed in the right radial artery. Selective coronary catheterization was performed using a 4 Fr catheter(s) exchanged over a guide wire to cannulate the coronary arteries.  Multiple injections of contrast were made into the left and right coronary arteries with angiograms recorded in multiple projections. After completion of the procedure, the arterial sheath was pulled and a TR Band Radial Compression Device was utilized to obtain patent hemostasis.    Coronary Angiography:  The coronary circulation is right dominant.    Left Main Coronary Artery:  The left main coronary artery is a normal caliber vessel. The left main arises normally from the left coronary sinus of Valsalva and bifurcates into the LAD and circumflex coronary arteries. The left main coronary artery showed no significant disease or stenosis greater than 30%.    Left Anterior Descending Coronary Artery Distribution:  The left anterior descending coronary artery is a normal caliber vessel. The LAD arises normally from the left main coronary artery. The mid left anterior descending coronary artery showed 70% stenosis. This lesion was tubular. An  additional lesion, located at the distal left anterior descending coronary artery, revealed 80% stenosis. The 1st diagonal branch is a small caliber vessel. The proximal to mid 1st diagonal branch revealed 80% stenosis. This lesion was long.    Circumflex Coronary Artery Distribution:  The circumflex coronary artery is a normal caliber vessel. The circumflex arises normally from the left main coronary artery and terminates in the AV groove. The circumflex revealed no significant disease or stenosis greater than 30%. The mid 1st obtuse marginal branch showed 70% stenosis. The mid 2nd obtuse marginal branch demonstrated 60% stenosis. This lesion was long.    Right Coronary Artery Distribution:    The right coronary artery is a normal caliber vessel. The RCA arises normally from the right sinus of Valsalva. The RCA showed no significant disease or stenosis greater than 30%.    Coronary Lesion Summary:  Vessel         Stenosis   Vessel Segment  LAD          70% stenosis       mid  LAD          80% stenosis     distal  1st Diagonal 80% stenosis proximal to mid  OM 1         70% stenosis       mid  OM 2         60% stenosis       mid      Hemo Personnel:  +--------------+---------+  Name          Duty       +--------------+---------+  Wood Turner MD 1  +--------------+---------+      Hemodynamic Pressures:    +----+-------------------+---------+------------+-------------+------+---------+  Site     Date Time       Phase    Systolic    Diastolic    ED  Mean mmHg                           Name       mmHg        mmHg      mmHg            +----+-------------------+---------+------------+-------------+------+---------+    AO  3/15/2024 8:35:46 AIR REST         161           73            105                       AM                                                   +----+-------------------+---------+------------+-------------+------+---------+    AO  3/15/2024 8:35:55 AIR REST          163           75            108                       AM                                                   +----+-------------------+---------+------------+-------------+------+---------+    LV  3/15/2024 8:40:17 AIR REST         158           -1    22                                AM                                                   +----+-------------------+---------+------------+-------------+------+---------+    LV  3/15/2024 8:40:27 AIR REST         161            0    23                                AM                                                   +----+-------------------+---------+------------+-------------+------+---------+   LVp  3/15/2024 8:40:42 AIR REST         162           68    83                                AM                                                   +----+-------------------+---------+------------+-------------+------+---------+   AOp  3/15/2024 8:40:49 AIR REST         157           66            102                       AM                                                   +----+-------------------+---------+------------+-------------+------+---------+      Complications:  No in-lab complications observed.    Cardiac Cath Post Procedure Notes:  Post Procedure Diagnosis: Severe 1V CAD.  Blood Loss:               Estimated blood loss during the procedure was 0 mls.  Specimens Removed:        Number of specimen(s) removed: none.      Recommendations:  Maximize medical therapy.  Agressive risk factor modification efforts.  Severe long mid-LAD and moderate branch vessel disease.    ____________________________________________________________________________________  CONCLUSIONS:  1. Single vessel disease.    ICD 10 Codes:  Atherosclerotic heart disease of native coronary artery without angina pectoris-I25.10; Abnormal result of cardiovascular function study, unspecified-R94.30    CPT Codes:  Left  Heart Cath (visualization of coronaries) and -82338; Moderate Sedation Services initial 15 minutes patient >5 years-22576    [id number] Josephine Islas MD  Performing Physician  Electronically signed by [id number] Josephine Islas MD on 3/19/2024 at 1:39:11 PM          ** Final **    Right Heart Cath: No results found for this or any previous visit from the past 1800 days.    Stress Test:  Nuclear:No results found for this or any previous visit from the past 1800 days.    Metabolic Stress: No results found for this or any previous visit from the past 1800 days.    Cardiac Imaging:  Cardiac Scoring:   CT angio coronary art with heartflow if score >30% 02/28/2024    Narrative  Interpreted By:  Rosa Maria Zhao,  STUDY:  CT ANGIO CORONARY ART WITH HEARTFLOW IF SCORE >30%;  2/28/2024 2:13 pm    INDICATION:  Signs/Symptoms:Chest pain.  .  There is need to define coronary  anatomy.    COMPARISON:  None.    ACCESSION NUMBER(S):  [id number]    ORDERING CLINICIAN:  JOSEPHINE ISLAS    TECHNIQUE:  Using multi-detector CT technology,  axial, sequential imaging with  prospective gating was performed of the chest following the  intravenous administration of contrast material.  A low-osmolar  contrast agent was used (70 cc of Omnipaque 350). CT FFR analysis was  also performed.    The patient was premedicated with  5 mg i.v metoprolol and 0.8 mg  sublingual nitroglycerin for heart rate control and coronary  dilation, respectively.    For optimization of anatomic evaluation, multiplanar reconstruction,  maximum intensity projections, and advanced 3-D off-line  postprocessing were performed on a dedicated stand-alone workstation  under the direct supervision of the interpreting physician.    CT Dose-Length Product (DLP):   1122 mGy/cm  CT Dose Reduction Employed: Yes (Prospective triggering, iterative  reconstruction)    FINDINGS:  POTENTIAL STUDY LIMITATIONS:  None.    CORONARY ARTERIES:    CORONARY ANATOMY:  There is normal origin of the  coronary arteries.    LEFT MAIN CORONARY ARTERY:  The left main is normal sized vessel that  trifurcates into  LAD,circumflex artery and ramus intermedius. A calcified plaque in  the distal left main coronary artery causing less than 30% stenosis.    LEFT ANTERIOR DESCENDING ARTERY:  The LAD is a normal size vessel that  wraps around the apex.  It gives rise to  2 acute diagonal branches.  Calcified and noncalcified plaques are identified in the proximal and  mid LAD. The most dominant area of stenosis is located within the  most proximal portion of the LAD causing approximately 50-70%  stenosis. There is also dense calcified plaque in the proximal 1st  diagonal causing approximately 50-70% stenosis.    LEFT CIRCUMFLEX ARTERY:  The LCX is a normal size vessel, which is  non-dominant.  It gives rise to  1 obtuse marginal branches.  Calcified plaque in the proximal LCX causing no significant stenosis.    RAMUS INTERMEDIUS:  The ramus is a small sized vessel.  There is no significant atherosclerotic change or stenotic disease.    RIGHT CORONARY ARTERY:  The RCA is a normal size vessel, which is  dominant .  It gives rise to a  conus branch,  brandon branch, and  3 acute  marginal branches.  In its distal segment it bifurcates into the PDA  and PV branch. There is no significant atherosclerotic change or  stenotic disease.    CARDIAC CHAMBERS:  The cardiac chambers demonstrate normal atrioventricular and  ventriculoarterial concordance, and systemic and pulmonary venous  return.    Cardiac chambers are not enlarged    AORTIC VALVE:  The aortic valve is  trileaflet in morphology.  No calcifications.    MITRAL VALVE:  No thickening/calcification.    THORACIC AORTA:  The visualized thoracic aorta is normal in course, caliber, and  contour. Moderate atherosclerotic disease of the visualized portion  of the thoracic aorta. There is no acute aortic pathology, such as  dissection, intramural hematoma, or contained rupture. The  aortic  arch is not included on this examination.    PERICARDIUM:  There is no pericardial effusion of thickening.    CHEST:  The chest wall is normal.  No significant lymphadenopathy or mass is seen in limited images of  the mediastinum. Limited imaging through the lungs reveals no gross  abnormalities. No pleural effusion or pneumothorax.    UPPER ABDOMEN:  Limited imaging through the upper abdomen reveals no abnormalities of  the visualized organs.    Impression  1. Multiple calcified plaques in proximal and mid LAD causing to  50-70% stenosis. There is also dense calcified plaque in the proximal  1st diagonal causing approximately 50-70% stenosis. CT FFR suggest  presence of hemodynamically stenosis in the mid and distal LAD.  2. Minimal atherosclerotic calcification in the proximal LCX causing  no significant stenosis. There is also no significant atherosclerotic  disease or stenosis in the RCA.  3. Normal coronary anatomy with right dominant system.      MACRO:  None    Signed by: Rosa Maria Roth 2/28/2024 4:13 PM  Dictation workstation:   [id number]    Cardiac MRI: No results found for this or any previous visit from the past 1800 days.         Assessment/Plan  Assessment/Plan   Assessment & Plan  Coronary artery disease of native artery of native heart with stable angina pectoris        -[facility] with Dr. Turner 1/24/25  -ASA 81 mg PO taken home prior to arrival       NP discussed with Dr. Turner regarding plan of care/ discharge plan      I spent 30 minutes in the professional and overall care of this patient.      JAYLAN Montaño-CNP

## 2025-01-29 ENCOUNTER — CLINICAL SUPPORT (OUTPATIENT)
Dept: PREADMISSION TESTING | Facility: HOSPITAL | Age: 75
End: 2025-01-29
Payer: MEDICARE

## 2025-01-29 NOTE — CPM/PAT H&P
CPM/PAT Evaluation       Name: Tracy Santoyo (Tracy Santoyo)  /Age: 1950/74 y.o.     {[facility] Visit Type:74265}      Chief Complaint: ***    HPI  Tracy Santoyo is scheduled fore CABG, ROBOT- ASSISTED, using graft- Left on 25 with Dr. Ian Schmitt.  Past Medical History:   Diagnosis Date    Agatston coronary artery calcium score between 200 and 399 2024    KGS=710 (3/21/23)    Angina pectoris     treated with anti-anginal medical therapy    Chronic sinusitis     Coronary artery disease involving native coronary artery of native heart without angina pectoris 2024    LHC (3/15/24) 70% mid LAD, 70% OM, 80% D1    JOVEL (dyspnea on exertion) 01/15/2025    Encounter for fitting and adjustment of other specified devices 2022    Fitting and adjustment of pessary    Encounter for fitting and adjustment of other specified devices 2019    Fitting and adjustment of pessary    Essential hypertension 2024    Hypercholesteremia 2024    Hypertension     Hypothyroidism     Other chest pain 2024    PAC (premature atrial contraction) 01/15/2025    Presence of urogenital implants 2022    Vaginal pessary present    Trigeminal neuralgia        Past Surgical History:   Procedure Laterality Date    APPENDECTOMY  1950    BLADDER SUSPENSION      CARDIAC CATHETERIZATION N/A 03/15/2024    Procedure: Left Heart Cath with Coronary Angiography and LV;  Surgeon: Wood Turner MD;  Location: [facility];  Service: Cardiovascular;  Laterality: N/A;    CARDIAC CATHETERIZATION N/A 2025    Procedure: Left Heart Cath with Coronary Angiography and LV;  Surgeon: Wood Turner MD;  Location: [facility];  Service: Cardiovascular;  Laterality: N/A;    CHOLECYSTECTOMY      COLONOSCOPY      CRANIOTOMY      craniotomy with trigeminal nerve decompression    HYSTERECTOMY      LIVER SURGERY      resection of liver hemangioma    OTHER SURGICAL HISTORY  01/15/2019     Abdominoplasty    OTHER SURGICAL HISTORY  01/15/2019    Urethropexy    OTHER SURGICAL HISTORY  10/18/2022    Sacrospinous fixation of vaginal vault    SINUS SURGERY  2012       Patient Sexual activity questions deferred to the physician.    No family history on file.    Allergies   Allergen Reactions    Carbamazepine Other     Myalgia- generalized weakness    Adhesive Tape-Silicones Itching and Hives       Prior to Admission medications    Medication Sig Start Date End Date Taking? Authorizing Provider   aspirin 81 mg EC tablet Take 1 tablet (81 mg) by mouth once daily.    Historical Provider, MD   cyanocobalamin (Vitamin B-12) 500 mcg tablet Take 1 tablet (500 mcg) by mouth 1 (one) time per week. 7/27/22   Historical Provider, MD   dilTIAZem CD (Cardizem CD) 240 mg 24 hr capsule Take 1 capsule (240 mg) by mouth once daily. 7/31/24 7/31/25  Silvia Can MD   estradiol (Estrace) 0.01 % (0.1 mg/gram) vaginal cream Insert into the vagina. 11/27/19   Historical Provider, MD   evening primrose oil 500 mg Take 1 capsule (500 mg) by mouth once daily. 7/27/22   Historical Provider, MD   isosorbide mononitrate ER (Imdur) 60 mg 24 hr tablet Take 1 tablet (60 mg) by mouth once daily. Do not crush or chew. 1/15/25 1/10/26  Wood Turner MD   levothyroxine (Synthroid, Levoxyl) 125 mcg tablet Take 1 tablet (125 mcg) by mouth once daily. 8/21/23   Historical Provider, MD   multivitamin tablet Take 1 tablet by mouth 3 times a week. 1/26/22   Historical Provider, MD   nitrofurantoin (Macrodantin) 50 mg capsule 1 capsule (50 mg) by Enteral route every other day. 8/23/22   Historical Provider, MD   nitroglycerin (Nitrostat) 0.3 mg SL tablet Place 1 tablet (0.3 mg) under the tongue every 5 minutes if needed for chest pain (up to 3). 7/31/24   Silvia Can MD   polyethylene glycol (Miralax) 17 gram/dose powder Take by mouth. 1/26/22   Historical Provider, MD   rosuvastatin (Crestor) 20 mg tablet Take 1 tablet (20 mg) by  mouth once daily. 4/2/24 4/2/25  Wood Turner MD   co-enzyme Q-10 50 mg capsule Take 1 capsule (50 mg) by mouth 2 times a day. 7/27/22 1/24/25  Historical Provider, MD   magnesium 250 mg tablet Take 1 tablet (250 mg) by mouth twice a day.  1/24/25  Historical Provider, MD        PAT ROS     PAT Physical Exam     Airway    Testing/Diagnostic:   ECG; 1/9/25- [facility]- [facility]  Sinus Bradycardia  Nonspecific ST Abnormality  Abnormal ECG    CT ANGIO CORONARY ART WITH HEARTFLOW IF SCORE >30%; 2/28/2024   IMPRESSION:  1. Multiple calcified plaques in proximal and mid LAD causing to  50-70% stenosis. There is also dense calcified plaque in the proximal  1st diagonal causing approximately 50-70% stenosis. CT FFR suggest  presence of hemodynamically stenosis in the mid and distal LAD.  2. Minimal atherosclerotic calcification in the proximal LCX causing  no significant stenosis. There is also no significant atherosclerotic  disease or stenosis in the RCA.  3. Normal coronary anatomy with right dominant system.    Cardiac Cath; 1/24/25  CONCLUSIONS:  1. Double vessel disease.    TRANSTHORACIC ECHO   CONCLUSIONS:   1. The left ventricular systolic function is normal, with a Buckley's biplane calculated ejection fraction of 66%.   2. Spectral Doppler shows a Grade I (impaired relaxation pattern) of left ventricular diastolic filling with normal left atrial filling pressure.   3. There is normal right ventricular global systolic function.   4. Aortic valve sclerosis      Holter Monitor; 10/2-10/16/24        Stress Test; 4/4/23         CT CARDIAC SCORING; 3/21/2023   Impression:  1. Coronary artery calcium score of 330*.     Patient Specialist/PCP:   PCP: Marquez Duncan DO 1/9/25 at [facility] seen for constipation    CT surgery: Edgar Schmitt MD 1/20/25 referred by Yue for coronary bypass grafting.     Cards: Wood Turner MD 1/15/25 presenting with coronary artery disease. Tracy initially presented with angina and has been  treated with anti-anginal medical therapy for MVD by [facility] 3/15/24. She has not undergone revascularization.     ENT: Siri Cantor DO 11/18/24 at [facility] seen for nosebleeds. She had one additional left sided nosebleed 5 days after cauterization. History of chronic sinusitis, S/P sinus surgery 10 years ago.     OBGYN: Cristobal Turpin MD 5/8/23  Visit Vitals  LMP  (LMP Unknown)   OB Status Postmenopausal   Smoking Status Never       DASI Risk Score    No data to display       Caprini DVT Assessment      Flowsheet Row Admission (Discharged) from 3/15/[facility] with Wood Turner MD ED from 2/9/2024 in [facility] Emergency Medicine with Crystal Powell MD   DVT Score (IF A SCORE IS NOT CALCULATING, MUST SELECT A BMI TO COMPLETE) 3 filed at 03/15/2024 0857 3 filed at 02/09/2024 1745   BMI (BMI MUST BE CHOSEN) 30 or less filed at 03/15/2024 0857 30 or less filed at 02/09/2024 1745   RETIRED: Age 60-75 years filed at 03/15/2024 0857 60-75 years filed at 02/09/2024 1745          Modified Frailty Index    No data to display       CHADS2 Stroke Risk  Current as of today        N/A 3 to 100%: High Risk   2 to < 3%: Medium Risk   0 to < 2%: Low Risk     Last Change: N/A          This score determines the patient's risk of having a stroke if the patient has atrial fibrillation.        This score is not applicable to this patient. Components are not calculated.          Revised Cardiac Risk Index    No data to display       Apfel Simplified Score    No data to display       Risk Analysis Index Results This Encounter    No data found in the last 10 encounters.       Prodigy: High Risk  Total Score: 12              Prodigy Age Score           ARISCAT Score for Postoperative Pulmonary Complications    No data to display       Poe Perioperative Risk for Myocardial Infarction or Cardiac Arrest (XIAO)    No data to display         Assessment and Plan:    ONLY  Krystle Iyer  RN  Pre-Admission Testing   {[facility] EMBEDDED ASSESSMENT AND PLAN:79925}

## 2025-02-03 ENCOUNTER — HOSPITAL ENCOUNTER (OUTPATIENT)
Dept: RADIOLOGY | Facility: HOSPITAL | Age: 75
Discharge: HOME | End: 2025-02-03
Payer: MEDICARE

## 2025-02-03 ENCOUNTER — TELEMEDICINE (OUTPATIENT)
Dept: CARDIAC SURGERY | Facility: HOSPITAL | Age: 75
End: 2025-02-03
Payer: MEDICARE

## 2025-02-03 ENCOUNTER — ANESTHESIA EVENT (OUTPATIENT)
Dept: OPERATING ROOM | Facility: HOSPITAL | Age: 75
End: 2025-02-03
Payer: MEDICARE

## 2025-02-03 ENCOUNTER — PRE-ADMISSION TESTING (OUTPATIENT)
Dept: PREADMISSION TESTING | Facility: HOSPITAL | Age: 75
End: 2025-02-03
Payer: MEDICARE

## 2025-02-03 VITALS
HEART RATE: 64 BPM | HEIGHT: 66 IN | DIASTOLIC BLOOD PRESSURE: 72 MMHG | WEIGHT: 157.8 LBS | BODY MASS INDEX: 25.36 KG/M2 | RESPIRATION RATE: 18 BRPM | OXYGEN SATURATION: 97 % | TEMPERATURE: 97.9 F | SYSTOLIC BLOOD PRESSURE: 132 MMHG

## 2025-02-03 DIAGNOSIS — Z01.810 PREOPERATIVE CARDIOVASCULAR EXAMINATION: ICD-10-CM

## 2025-02-03 DIAGNOSIS — R82.90 ABNORMAL URINE FINDING: ICD-10-CM

## 2025-02-03 DIAGNOSIS — I65.23 BILATERAL CAROTID ARTERY STENOSIS: ICD-10-CM

## 2025-02-03 DIAGNOSIS — I25.10 CAD IN NATIVE ARTERY: ICD-10-CM

## 2025-02-03 DIAGNOSIS — Z41.9 SURGERY, ELECTIVE: Primary | ICD-10-CM

## 2025-02-03 DIAGNOSIS — I83.893 VARICOSE VEINS OF BILATERAL LOWER EXTREMITIES WITH OTHER COMPLICATIONS: ICD-10-CM

## 2025-02-03 DIAGNOSIS — Z01.818 PREOPERATIVE CLEARANCE: ICD-10-CM

## 2025-02-03 DIAGNOSIS — R94.2 ABNORMAL RESULTS OF PULMONARY FUNCTION STUDIES: ICD-10-CM

## 2025-02-03 DIAGNOSIS — I25.10 CAD IN NATIVE ARTERY: Primary | ICD-10-CM

## 2025-02-03 DIAGNOSIS — R07.9 CHEST PAIN, UNSPECIFIED TYPE: ICD-10-CM

## 2025-02-03 DIAGNOSIS — Z01.811 PREOPERATIVE RESPIRATORY EXAMINATION: ICD-10-CM

## 2025-02-03 LAB
ABO GROUP (TYPE) IN BLOOD: NORMAL
ALBUMIN SERPL BCP-MCNC: 4.3 G/DL (ref 3.4–5)
ALP SERPL-CCNC: 72 U/L (ref 33–136)
ALT SERPL W P-5'-P-CCNC: 23 U/L (ref 7–45)
ANION GAP SERPL CALC-SCNC: 13 MMOL/L (ref 10–20)
ANTIBODY SCREEN: NORMAL
APTT PPP: 29 SECONDS (ref 27–38)
AST SERPL W P-5'-P-CCNC: 17 U/L (ref 9–39)
BASOPHILS # BLD AUTO: 0.04 X10*3/UL (ref 0–0.1)
BASOPHILS NFR BLD AUTO: 0.5 %
BILIRUB SERPL-MCNC: 0.5 MG/DL (ref 0–1.2)
BUN SERPL-MCNC: 13 MG/DL (ref 6–23)
CALCIUM SERPL-MCNC: 10 MG/DL (ref 8.6–10.6)
CHLORIDE SERPL-SCNC: 103 MMOL/L (ref 98–107)
CO2 SERPL-SCNC: 28 MMOL/L (ref 21–32)
CREAT SERPL-MCNC: 0.71 MG/DL (ref 0.5–1.05)
CRP SERPL-MCNC: 0.35 MG/DL
EGFRCR SERPLBLD CKD-EPI 2021: 88 ML/MIN/1.73M*2
EOSINOPHIL # BLD AUTO: 0.34 X10*3/UL (ref 0–0.4)
EOSINOPHIL NFR BLD AUTO: 4.6 %
ERYTHROCYTE [DISTWIDTH] IN BLOOD BY AUTOMATED COUNT: 14 % (ref 11.5–14.5)
GLUCOSE SERPL-MCNC: 107 MG/DL (ref 74–99)
HCT VFR BLD AUTO: 41.2 % (ref 36–46)
HGB BLD-MCNC: 13.8 G/DL (ref 12–16)
IMM GRANULOCYTES # BLD AUTO: 0.01 X10*3/UL (ref 0–0.5)
IMM GRANULOCYTES NFR BLD AUTO: 0.1 % (ref 0–0.9)
INR PPP: 1 (ref 0.9–1.1)
LYMPHOCYTES # BLD AUTO: 2.84 X10*3/UL (ref 0.8–3)
LYMPHOCYTES NFR BLD AUTO: 38.3 %
MCH RBC QN AUTO: 30.1 PG (ref 26–34)
MCHC RBC AUTO-ENTMCNC: 33.5 G/DL (ref 32–36)
MCV RBC AUTO: 90 FL (ref 80–100)
MONOCYTES # BLD AUTO: 0.68 X10*3/UL (ref 0.05–0.8)
MONOCYTES NFR BLD AUTO: 9.2 %
NEUTROPHILS # BLD AUTO: 3.5 X10*3/UL (ref 1.6–5.5)
NEUTROPHILS NFR BLD AUTO: 47.3 %
NRBC BLD-RTO: 0 /100 WBCS (ref 0–0)
PLATELET # BLD AUTO: 279 X10*3/UL (ref 150–450)
POTASSIUM SERPL-SCNC: 4.1 MMOL/L (ref 3.5–5.3)
PROT SERPL-MCNC: 7.2 G/DL (ref 6.4–8.2)
PROTHROMBIN TIME: 11.4 SECONDS (ref 9.8–12.8)
RBC # BLD AUTO: 4.58 X10*6/UL (ref 4–5.2)
RH FACTOR (ANTIGEN D): NORMAL
SODIUM SERPL-SCNC: 140 MMOL/L (ref 136–145)
WBC # BLD AUTO: 7.4 X10*3/UL (ref 4.4–11.3)

## 2025-02-03 PROCEDURE — 84075 ASSAY ALKALINE PHOSPHATASE: CPT

## 2025-02-03 PROCEDURE — 81001 URINALYSIS AUTO W/SCOPE: CPT

## 2025-02-03 PROCEDURE — 36415 COLL VENOUS BLD VENIPUNCTURE: CPT

## 2025-02-03 PROCEDURE — 71046 X-RAY EXAM CHEST 2 VIEWS: CPT

## 2025-02-03 PROCEDURE — 85610 PROTHROMBIN TIME: CPT

## 2025-02-03 PROCEDURE — 87081 CULTURE SCREEN ONLY: CPT

## 2025-02-03 PROCEDURE — 86140 C-REACTIVE PROTEIN: CPT

## 2025-02-03 PROCEDURE — 99204 OFFICE O/P NEW MOD 45 MIN: CPT | Performed by: ANESTHESIOLOGY

## 2025-02-03 PROCEDURE — 85025 COMPLETE CBC W/AUTO DIFF WBC: CPT

## 2025-02-03 PROCEDURE — 99212 OFFICE O/P EST SF 10 MIN: CPT | Performed by: THORACIC SURGERY (CARDIOTHORACIC VASCULAR SURGERY)

## 2025-02-03 PROCEDURE — 1157F ADVNC CARE PLAN IN RCRD: CPT | Performed by: THORACIC SURGERY (CARDIOTHORACIC VASCULAR SURGERY)

## 2025-02-03 PROCEDURE — 87086 URINE CULTURE/COLONY COUNT: CPT

## 2025-02-03 PROCEDURE — 86901 BLOOD TYPING SEROLOGIC RH(D): CPT

## 2025-02-03 RX ORDER — CHLORHEXIDINE GLUCONATE ORAL RINSE 1.2 MG/ML
15 SOLUTION DENTAL DAILY
Qty: 30 ML | Refills: 0 | Status: SHIPPED | OUTPATIENT
Start: 2025-02-03 | End: 2025-02-05

## 2025-02-03 RX ORDER — CHLORHEXIDINE GLUCONATE 40 MG/ML
1 SOLUTION TOPICAL DAILY
Qty: 25 ML | Refills: 0 | Status: SHIPPED | OUTPATIENT
Start: 2025-02-03 | End: 2025-02-08

## 2025-02-03 ASSESSMENT — DUKE ACTIVITY SCORE INDEX (DASI)
CAN YOU DO HEAVY WORK AROUND THE HOUSE LIKE SCRUBBING FLOORS OR LIFTING AND MOVING HEAVY FURNITURE: NO
CAN YOU WALK INDOORS, SUCH AS AROUND YOUR HOUSE: YES
CAN YOU PARTICIPATE IN STRENOUS SPORTS LIKE SWIMMING, SINGLES TENNIS, FOOTBALL, BASKETBALL, OR SKIING: NO
CAN YOU RUN A SHORT DISTANCE: NO
CAN YOU DO YARD WORK LIKE RAKING LEAVES, WEEDING OR PUSHING A MOWER: NO
CAN YOU CLIMB A FLIGHT OF STAIRS OR WALK UP A HILL: NO
CAN YOU TAKE CARE OF YOURSELF (EAT, DRESS, BATHE, OR USE TOILET): YES
TOTAL_SCORE: 9.95
CAN YOU DO MODERATE WORK AROUND THE HOUSE LIKE VACUUMING, SWEEPING FLOORS OR CARRYING GROCERIES: NO
CAN YOU WALK A BLOCK OR TWO ON LEVEL GROUND: YES
CAN YOU PARTICIPATE IN MODERATE RECREATIONAL ACTIVITIES LIKE GOLF, BOWLING, DANCING, DOUBLES TENNIS OR THROWING A BASEBALL OR FOOTBALL: NO
DASI METS SCORE: 4
CAN YOU HAVE SEXUAL RELATIONS: NO
CAN YOU DO LIGHT WORK AROUND THE HOUSE LIKE DUSTING OR WASHING DISHES: YES

## 2025-02-03 ASSESSMENT — LIFESTYLE VARIABLES: SMOKING_STATUS: NONSMOKER

## 2025-02-03 ASSESSMENT — ACTIVITIES OF DAILY LIVING (ADL): ADL_SCORE: 0

## 2025-02-03 ASSESSMENT — PAIN - FUNCTIONAL ASSESSMENT: PAIN_FUNCTIONAL_ASSESSMENT: 0-10

## 2025-02-03 ASSESSMENT — ENCOUNTER SYMPTOMS
CHILLS: 0
PND: 0
FEVER: 0
PALPITATIONS: 1
COUGH: 0
SHORTNESS OF BREATH: 1
DYSPNEA WITH EXERTION: 1
BRUISES/BLEEDS EASILY: 0
SINUS CONGESTION: 0

## 2025-02-03 ASSESSMENT — PAIN SCALES - GENERAL: PAINLEVEL_OUTOF10: 0 - NO PAIN

## 2025-02-03 NOTE — H&P (VIEW-ONLY)
Subjective   Tracy Santoyo is a 74 y.o. female referred by Yue for coronary bypass grafting. She reports chest pain. She denies fatigue.      Past Medical History:   Diagnosis Date    Agatston coronary artery calcium score between 200 and 399 02/12/2024    VUQ=314 (3/21/23)    Angina pectoris     treated with anti-anginal medical therapy    Chronic sinusitis     Coronary artery disease involving native coronary artery of native heart without angina pectoris 04/02/2024    LHC (3/15/24) 70% mid LAD, 70% OM, 80% D1    JOVEL (dyspnea on exertion) 01/15/2025    Encounter for fitting and adjustment of other specified devices 08/23/2022    Fitting and adjustment of pessary    Encounter for fitting and adjustment of other specified devices 12/02/2019    Fitting and adjustment of pessary    Essential hypertension 04/02/2024    Hypercholesteremia 04/02/2024    Hypertension     Hypothyroidism     Other chest pain 02/12/2024    PAC (premature atrial contraction) 01/15/2025    Presence of urogenital implants 09/06/2022    Vaginal pessary present    Trigeminal neuralgia      Past Surgical History:   Procedure Laterality Date    APPENDECTOMY  1950    BLADDER SUSPENSION  1994    CARDIAC CATHETERIZATION N/A 03/15/2024    Procedure: Left Heart Cath with Coronary Angiography and LV;  Surgeon: Wood Turner MD;  Location: [facility] Cardiac [facility];  Service: Cardiovascular;  Laterality: N/A;    CARDIAC CATHETERIZATION N/A 01/24/2025    Procedure: Left Heart Cath with Coronary Angiography and LV;  Surgeon: Wood Turner MD;  Location: [facility] Cardiac [facility];  Service: Cardiovascular;  Laterality: N/A;    CHOLECYSTECTOMY  2010    COLONOSCOPY      CRANIOTOMY  2011    craniotomy with trigeminal nerve decompression    HYSTERECTOMY  1990    LIVER SURGERY  2010    resection of liver hemangioma    OTHER SURGICAL HISTORY  01/15/2019    Abdominoplasty    OTHER SURGICAL HISTORY  01/15/2019    Urethropexy    OTHER SURGICAL HISTORY  10/18/2022     Sacrospinous fixation of vaginal vault    SINUS SURGERY  2012     No family history on file.    Allergies   Allergen Reactions    Carbamazepine Other     Myalgia- generalized weakness    Adhesive Tape-Silicones Itching and Hives         Current Outpatient Medications:     aspirin 81 mg EC tablet, Take 1 tablet (81 mg) by mouth once daily., Disp: , Rfl:     chlorhexidine (Hibiclens) 4 % external liquid, Apply 1 Application topically once daily for 5 days. Use per CPM/PAT provided instructions, Disp: 25 mL, Rfl: 0    chlorhexidine (Peridex) 0.12 % solution, Use 15 mL in the mouth or throat once daily for 2 doses., Disp: 30 mL, Rfl: 0    cyanocobalamin (Vitamin B-12) 500 mcg tablet, Take 1 tablet (500 mcg) by mouth 1 (one) time per week., Disp: , Rfl:     dilTIAZem CD (Cardizem CD) 240 mg 24 hr capsule, Take 1 capsule (240 mg) by mouth once daily., Disp: 90 capsule, Rfl: 3    estradiol (Estrace) 0.01 % (0.1 mg/gram) vaginal cream, Insert into the vagina 3 times a week., Disp: , Rfl:     evening primrose oil 500 mg, Take 1 capsule (500 mg) by mouth 2 times a week., Disp: , Rfl:     isosorbide mononitrate ER (Imdur) 60 mg 24 hr tablet, Take 1 tablet (60 mg) by mouth once daily. Do not crush or chew., Disp: 90 tablet, Rfl: 3    levothyroxine (Synthroid, Levoxyl) 125 mcg tablet, Take 1 tablet (125 mcg) by mouth once daily., Disp: , Rfl:     multivitamin tablet, Take 1 tablet by mouth 3 times a week., Disp: , Rfl:     nitrofurantoin (Macrodantin) 50 mg capsule, 1 capsule (50 mg) by Enteral route every other day., Disp: , Rfl:     nitroglycerin (Nitrostat) 0.3 mg SL tablet, Place 1 tablet (0.3 mg) under the tongue every 5 minutes if needed for chest pain (up to 3)., Disp: 30 tablet, Rfl: 2    polyethylene glycol (Miralax) 17 gram/dose powder, Mix 17 g of powder and drink once daily., Disp: , Rfl:     rosuvastatin (Crestor) 20 mg tablet, Take 1 tablet (20 mg) by mouth once daily., Disp: 30 tablet, Rfl: 11    Review of systems  negative .       Objective   Cardiac Studies  Cardiac catheterization revealed lateral ischemia.    EF: >50%  Vessels: Double vessel disease: LAD and Circumflex    Physical Exam  General: She is a pleasant female currently in no distress.  LMP  (LMP Unknown)    There is no height or weight on file to calculate BMI.   HEENT: Normocephalic and atraumatic. PERRLA. EOMs are full. Dentition is unremarkable.  NECK: Supple without thyromegaly, masses, or carotid bruits.  CHEST: Clear.  HEART: Regular rate and rhythm.  ABDOMEN: Soft, flat, nontender without organomegaly or masses.  NEUROLOGIC: Unremarkable.  EXTREMITIES: Unremarkable. Pedal pulses are palpable.    Data Review:       Assessment/Plan   Surgery on the 2/12 .  The left heart cath showed worsening of the circumflex disease.  We order the preop testing.  Because she have this vein stripping we order the vein mapping as part of her preop test.  If she have an normal veins or usable veins I think she should and there go CABG x 2 3, if the veins are not very good I think she probably is a candidate for a hybrid approach with a robotic LIMA to LAD and stenting of the circumflex.  Problem List Items Addressed This Visit    None      No orders of the defined types were placed in this encounter.

## 2025-02-03 NOTE — CPM/PAT H&P
CPM/PAT Evaluation       Name: Tracy Santoyo)  /Age: 1950/74 y.o.     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 Ian Schmitt secondary to symptomatic CAD.  PMHX includes known prior CAD following [facility] 3/2024 managed medically initially, h/o PACs, HTN, HLD, hypothyroidism, h/o trigeminal neuralgia s/p craniotomy  with complete resolution of symptoms.  Presents to CPM today for perioperative risk stratification and optimization.    Past Medical History:   Diagnosis Date    Agatston coronary artery calcium score between 200 and 399 2024    TKV=833 (3/21/23)    Angina pectoris     treated with anti-anginal medical therapy    Chronic sinusitis     Coronary artery disease involving native coronary artery of native heart without angina pectoris 2024    [facility] (3/15/24) 70% mid LAD, 70% OM, 80% D1    JOVEL (dyspnea on exertion) 01/15/2025    Encounter for fitting and adjustment of other specified devices 2022    Fitting and adjustment of pessary    Encounter for fitting and adjustment of other specified devices 2019    Fitting and adjustment of pessary    Essential hypertension 2024    Hypercholesteremia 2024    Hypertension     Hypothyroidism     Other chest pain 2024    PAC (premature atrial contraction) 01/15/2025    Presence of urogenital implants 2022    Vaginal pessary present    Trigeminal neuralgia        Past Surgical History:   Procedure Laterality Date    APPENDECTOMY  1950    BLADDER SUSPENSION      CARDIAC CATHETERIZATION N/A 03/15/2024    Procedure: Left Heart Cath with Coronary Angiography and LV;  Surgeon: Wood Turner MD;  Location: [facility] Cardiac Cath Lab;  Service: Cardiovascular;  Laterality: N/A;    CARDIAC CATHETERIZATION N/A 2025    Procedure: Left Heart Cath with Coronary Angiography and LV;  Surgeon: oWod BARRAZA  MD Yue;  Location: [facility] Cardiac Cath Lab;  Service: Cardiovascular;  Laterality: N/A;    CHOLECYSTECTOMY  2010    COLONOSCOPY      CRANIOTOMY  2011    craniotomy with trigeminal nerve decompression    HYSTERECTOMY  1990    LIVER SURGERY  2010    resection of liver hemangioma    OTHER SURGICAL HISTORY  01/15/2019    Abdominoplasty    OTHER SURGICAL HISTORY  01/15/2019    Urethropexy    OTHER SURGICAL HISTORY  10/18/2022    Sacrospinous fixation of vaginal vault    SINUS SURGERY  2012       Patient Sexual activity questions deferred to the physician.    No family history on file.    Allergies   Allergen Reactions    Carbamazepine Other     Myalgia- generalized weakness    Adhesive Tape-Silicones Itching and Hives       Prior to Admission medications    Medication Sig Start Date End Date Taking? Authorizing Provider   aspirin 81 mg EC tablet Take 1 tablet (81 mg) by mouth once daily.    Historical Provider, MD   cyanocobalamin (Vitamin B-12) 500 mcg tablet Take 1 tablet (500 mcg) by mouth 1 (one) time per week. 7/27/22   Historical Provider, MD   dilTIAZem CD (Cardizem CD) 240 mg 24 hr capsule Take 1 capsule (240 mg) by mouth once daily. 7/31/24 7/31/25  Silvia Can MD   estradiol (Estrace) 0.01 % (0.1 mg/gram) vaginal cream Insert into the vagina. 11/27/19   Historical Provider, MD   evening primrose oil 500 mg Take 1 capsule (500 mg) by mouth once daily. 7/27/22   Historical Provider, MD   isosorbide mononitrate ER (Imdur) 60 mg 24 hr tablet Take 1 tablet (60 mg) by mouth once daily. Do not crush or chew. 1/15/25 1/10/26  Wood Turner MD   levothyroxine (Synthroid, Levoxyl) 125 mcg tablet Take 1 tablet (125 mcg) by mouth once daily. 8/21/23   Historical Provider, MD   multivitamin tablet Take 1 tablet by mouth 3 times a week. 1/26/22   Historical Provider, MD   nitrofurantoin (Macrodantin) 50 mg capsule 1 capsule (50 mg) by Enteral route every other day. 8/23/22   Historical Provider, MD   nitroglycerin  (Nitrostat) 0.3 mg SL tablet Place 1 tablet (0.3 mg) under the tongue every 5 minutes if needed for chest pain (up to 3). 7/31/24   Silvia Can MD   polyethylene glycol (Miralax) 17 gram/dose powder Take by mouth. 1/26/22   Historical Provider, MD   rosuvastatin (Crestor) 20 mg tablet Take 1 tablet (20 mg) by mouth once daily. 4/2/24 4/2/25  Wood Turner MD        PAT ROS:   Constitutional:    no fever   no chills  Neuro/Psych:   Eyes:   Ears:   Nose:    no sinus congestion  Mouth:   Throat:    no throat pain  Neck:   Cardio:    chest pain (last episode 1/31, took NTG x3 with relief)   palpitations   no peripheral edema   JOVEL   no paroxysmal nocturnal dyspnea  Respiratory:    no cough   shortness of breath  Endocrine:   GI:   :   Musculoskeletal:   Hematologic:    does not bruise/bleed easily  Skin:      Physical Exam  Vitals reviewed. Physical exam within normal limits.   Constitutional:       Appearance: Normal appearance.   HENT:      Head: Normocephalic and atraumatic.   Cardiovascular:      Rate and Rhythm: Normal rate and regular rhythm.   Pulmonary:      Effort: Pulmonary effort is normal.      Breath sounds: Normal breath sounds.   Neurological:      General: No focal deficit present.      Mental Status: She is alert and oriented to person, place, and time. Mental status is at baseline.   Psychiatric:         Mood and Affect: Mood normal.         Behavior: Behavior normal.         Thought Content: Thought content normal.         Judgment: Judgment normal.          PAT AIRWAY:   Airway:     Mallampati::  II    TM distance::  >3 FB    Neck ROM::  Full          Latest Reference Range & Units 01/06/25 14:30 01/10/25 08:44   GLUCOSE 74 - 99 mg/dL 81    SODIUM 136 - 145 mmol/L 138    POTASSIUM 3.5 - 5.3 mmol/L 4.0    CHLORIDE 98 - 107 mmol/L 105    Bicarbonate 21 - 32 mmol/L 29    Anion Gap 10 - 20 mmol/L 8 (L)    Blood Urea Nitrogen 6 - 23 mg/dL 13    Creatinine 0.50 - 1.05 mg/dL 0.81    EGFR >60  "mL/min/1.73m*2 75    Calcium 8.6 - 10.3 mg/dL 9.4    BNP 0 - 99 pg/mL 101 (H)    Thyroid Stimulating Hormone 0.44 - 3.98 mIU/L  1.35   WBC 4.4 - 11.3 x10*3/uL  6.7   nRBC 0.0 - 0.0 /100 WBCs  0.0   RBC 4.00 - 5.20 x10*6/uL  4.73   HEMOGLOBIN 12.0 - 16.0 g/dL  14.4   HEMATOCRIT 36.0 - 46.0 %  42.8   MCV 80 - 100 fL  91   MCH 26.0 - 34.0 pg  30.4   MCHC 32.0 - 36.0 g/dL  33.6   RED CELL DISTRIBUTION WIDTH 11.5 - 14.5 %  14.3   Platelets 150 - 450 x10*3/uL  238   Neutrophils % 40.0 - 80.0 %  48.1   Immature Granulocytes %, Automated 0.0 - 0.9 %  0.1   Lymphocytes % 13.0 - 44.0 %  37.8   Monocytes % 2.0 - 10.0 %  8.1   Eosinophils % 0.0 - 6.0 %  5.5   Basophils % 0.0 - 2.0 %  0.4   Neutrophils Absolute 1.60 - 5.50 x10*3/uL  3.20   Immature Granulocytes Absolute, Automated 0.00 - 0.50 x10*3/uL  0.01   Lymphocytes Absolute 0.80 - 3.00 x10*3/uL  2.52   Monocytes Absolute 0.05 - 0.80 x10*3/uL  0.54   Eosinophils Absolute 0.00 - 0.40 x10*3/uL  0.37   Basophils Absolute 0.00 - 0.10 x10*3/uL  0.03   (L): Data is abnormally low  (H): Data is abnormally high    Visit Vitals  /72   Pulse 64   Temp 36.6 °C (97.9 °F) (Oral)   Resp 18   Ht 1.676 m (5' 6\")   Wt 71.6 kg (157 lb 12.8 oz)   LMP  (LMP Unknown)   SpO2 97%   BMI 25.47 kg/m²   OB Status Postmenopausal   Smoking Status Never   BSA 1.83 m²       DASI Risk Score      Flowsheet Row Pre-Admission Testing from 2/3/2025 in [facility]   Can you take care of yourself (eat, dress, bathe, or use toilet)?  2.75 filed at 02/03/2025 1312   Can you walk indoors, such as around your house? 1.75 filed at 02/03/2025 1312   Can you walk a block or two on level ground?  2.75 filed at 02/03/2025 1312   Can you climb a flight of stairs or walk up a hill? 0 filed at 02/03/2025 1312   Can you run a short distance? 0 filed at 02/03/2025 1312   Can you do light work around the house like dusting or washing dishes? 2.7 filed at 02/03/2025 1312   Can you do moderate work around " the house like vacuuming, sweeping floors or carrying groceries? 0 filed at 02/03/2025 1312   Can you do heavy work around the house like scrubbing floors or lifting and moving heavy furniture?  0 filed at 02/03/2025 1312   Can you do yard work like raking leaves, weeding or pushing a mower? 0 filed at 02/03/2025 1312   Can you have sexual relations? 0 filed at 02/03/2025 1312   Can you participate in moderate recreational activities like golf, bowling, dancing, doubles tennis or throwing a baseball or football? 0 filed at 02/03/2025 1312   Can you participate in strenous sports like swimming, singles tennis, football, basketball, or skiing? 0 filed at 02/03/2025 1312   DASI SCORE 9.95 filed at 02/03/2025 1312   METS Score (Will be calculated only when all the questions are answered) 4 filed at 02/03/2025 1312          Caprini DVT Assessment      Flowsheet Row Pre-Admission Testing from 2/3/2025 in [facility] Admission (Discharged) from 3/15/2024 in [facility] with Wood Turner MD   DVT Score (IF A SCORE IS NOT CALCULATING, MUST SELECT A BMI TO COMPLETE) 9 filed at 02/03/2025 1312 3 filed at 03/15/2024 0857   Surgical Factors Major surgery planned, lasting over 3 hours filed at 02/03/2025 1312 --   BMI (BMI MUST BE CHOSEN) 30 or less filed at 02/03/2025 1312 30 or less filed at 03/15/2024 0857   RETIRED: Age -- 60-75 years filed at 03/15/2024 0857          Modified Frailty Index      Flowsheet Row Pre-Admission Testing from 2/3/2025 in [facility]   Non-independent functional status (problems with dressing, bathing, personal grooming, or cooking) 0 filed at 02/03/2025 1344   History of diabetes mellitus  0 filed at 02/03/2025 1344   History of COPD 0 filed at 02/03/2025 1344   History of CHF No filed at 02/03/2025 1344   History of MI 0 filed at 02/03/2025 1344   History of Percutaneous Coronary Intervention, Cardiac Surgery, or Angina No filed at 02/03/2025 1340    Hypertension requiring the use of medication  0.0909 filed at 02/03/2025 1344   Peripheral vascular disease 0 filed at 02/03/2025 1344   Impaired sensorium (cognitive impairement or loss, clouding, or delirium) 0 filed at 02/03/2025 1344   TIA or CVA withouy residual deficit 0 filed at 02/03/2025 1344   Cerebrovascular accident with deficit 0 filed at 02/03/2025 1344   Modified Frailty Index Calculator .0909 filed at 02/03/2025 1344          CHADS2 Stroke Risk  Current as of 2 hours ago        N/A 3 to 100%: High Risk   2 to < 3%: Medium Risk   0 to < 2%: Low Risk     Last Change: N/A          This score determines the patient's risk of having a stroke if the patient has atrial fibrillation.        This score is not applicable to this patient. Components are not calculated.          Revised Cardiac Risk Index      Flowsheet Row Pre-Admission Testing from 2/3/2025 in [facility]   High-Risk Surgery (Intraperitoneal, Intrathoracic,Suprainguinal vascular) 1 filed at 02/03/2025 1312   History of ischemic heart disease (History of MI, History of positive exercuse test, Current chest paint considered due to myocardial ischemia, Use of nitrate therapy, ECG with pathological Q Waves) 1 filed at 02/03/2025 1312   History of congestive heart failure (pulmonary edemia, bilateral rales or S3 gallop, Paroxysmal nocturnal dyspnea, CXR showing pulmonary vascular redistribution) 0 filed at 02/03/2025 1312   History of cerebrovascular disease (Prior TIA or stroke) 0 filed at 02/03/2025 1312   Pre-operative insulin treatment 0 filed at 02/03/2025 1312   Pre-operative creatinine>2 mg/dl 0 filed at 02/03/2025 1312   Revised Cardiac Risk Calculator 2 filed at 02/03/2025 1312          Apfel Simplified Score      Flowsheet Row Pre-Admission Testing from 2/3/2025 in [facility]   Smoking status 1 filed at 02/03/2025 1343   History of motion sickness or PONV  1 filed at 02/03/2025 1343   Use of  postoperative opioids 1 filed at 2025 1343   Gender - Female 1=Yes filed at 2025 1343   Apfel Simplified Score Calculator 4 filed at 2025 1343          Risk Analysis Index Results This Encounter         2/3/2025  1344             Do you live in a place other than your own home?: 0    When did you begin living in the place you are currently residing?: Greater than one year ago    Any kidney failure, kidney not working well, or seeing a kidney doctor (nephrologist)? If yes, was this for kidney stones or another problem?: 0 No    Any history of chronic (long-term) congestive heart failure (CHF)?: 0 No    Any shortness of breath when resting?: 0 No    In the past five years, have you been diagnosed with or treated for cancer?: No    During the last 3 months has it become difficult for you to remember things or organize your thoughts?: 0 No    Have you lost weight of 10 pounds or more in the past 3 months without trying?: 0 No    Do you have any loss of appetitie?: 4 Yes    Getting Around (Mobility): 0 Can get around without help    Eatin Can plan and prepare own meals    Toiletin Can use toilet without any help    Personal Hygiene (Bathing, Hand Washing, Changing Clothes): 0 Can shower or bathe without any help    CALIX Cancer History: Patient does not indicate history of cancer    Total Risk Analysis Index Score Without Cancer: 28    Total Risk Analysis Index Score: 28          Stop Bang Score      Flowsheet Row Pre-Admission Testing from 2/3/2025 in [facility]   Do you snore loudly? 0 filed at 2025 1311   Do you often feel tired or fatigued after your sleep? 0 filed at 2025 1311   Has anyone ever observed you stop breathing in your sleep? 0 filed at 2025 1311   Do you have or are you being treated for high blood pressure? 1 filed at 2025 1311   Recent BMI (Calculated) 25.8 filed at 2025 1311   Is BMI greater than 35 kg/m2? 0=No filed at 2025  1311   Age older than 50 years old? 1=Yes filed at 02/03/2025 1311   Is your neck circumference greater than 17 inches (Male) or 16 inches (Female)? 0 filed at 02/03/2025 1311   Gender - Male 0=No filed at 02/03/2025 1311   STOP-BANG Total Score 2 filed at 02/03/2025 1311          Prodigy: High Risk  Total Score: 12              Prodigy Age Score           ARISCAT Score for Postoperative Pulmonary Complications      Flowsheet Row Pre-Admission Testing from 2/3/2025 in [facility]   Age Calculated Score 3 filed at 02/03/2025 1345   Preoperative SpO2 0 filed at 02/03/2025 1345   Respiratory infection in the last month Either upper or lower (i.e., URI, bronchitis, pneumonia), with fever and antibiotic treatment 0 filed at 02/03/2025 1345   Preoperative anemia (Hgb less than 10 g/dl) 0 filed at 02/03/2025 1345   Surgical incision  24 filed at 02/03/2025 1345   Duration of surgery  23 filed at 02/03/2025 1345   Emergency Procedure  0 filed at 02/03/2025 1345   ARISCAT Total Score  50 filed at 02/03/2025 1345          Deepika Perioperative Risk for Myocardial Infarction or Cardiac Arrest (XIAO)      Flowsheet Row Pre-Admission Testing from 2/3/2025 in [facility]   Calculated Age Score 1.52 filed at 02/03/2025 1345   Functional Status  0 filed at 02/03/2025 1345   ASA Class  -0.95 filed at 02/03/2025 1345   Creatinine 0 filed at 02/03/2025 1345   Type of Procedure  1.01 filed at 02/03/2025 1345   XIAO Total Score  -3.67 filed at 02/03/2025 1345   XIAO % 2.48 filed at 02/03/2025 1345          No results found for this or any previous visit (from the past 24 hours).     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 Ian Schmitt secondary to symptomatic CAD.  PMHX includes known prior CAD following [facility] 3/2024 managed medically initially, h/o PACs, HTN, HLD, hypothyroidism, h/o trigeminal neuralgia s/p craniotomy 2011 with complete  resolution of symptoms.  Presents to [facility] today for perioperative risk stratification and optimization.    Neuro:  Trigeminal neuralgia- remote hx of craniotomy 2011 with complete resolution of symptoms. Patient notes occasionally vertigo; no hx of PONV.  Patient is at increased risk for perioperative delirium secondary to  age, type and duration of surgery, Patient instructed on and provided cognitive exercises  Patient is at increased risk for perioperative CVA secondary to  cardiac disease, HTN, increased age, operative time > 2.5 hours, cardiac surgery    HEENT:  No HEENT diagnosis or significant findings on chart review or clinical presentation and evaluation. No further preoperative testing/intervention indicated at this time.    Cardiovascular:  CAD- Follows with cardiology, Dr. Turner. [facility] 3/15/24 with two vessel disease, LAD and Lcx, managed medically initially. Now scheduled for robotic assisted CABG for symptomatic CAD. -Continue ASA  Frequent PACs- Holter results 10/24 as below. Continue diltiazem  HTN- continue diltiazem, imdur  HLD- continue statin    No CV diagnosis or significant findings on chart review or clinical presentation and evaluation. No further preoperative testing or intervention is indicated at this time.  METS: 4  RCRI: 2 points, 10.1% risk for postoperative MACE   XIAO: 2.48% risk for 30 day postoperative MACE  EKG - 7/31/24:  Normal sinus rhythm  Nonspecific ST abnormality  Abnormal ECG  When compared with ECG of 09-FEB-2024 13:07,  No significant change since    [facility] 1/24/25:  1. Double vessel disease.     Holter monitor 10/2/24: Frequent PACs, 9% burden; no pauses, no AV block, no ventricular arrhythmia    TTE 3/20/24:  Left Ventricle: The left ventricular systolic function is normal. There are no regional wall motion abnormalities. The left ventricular cavity size is normal. There is moderate asymmetric left ventricular hypertrophy involving the septal wall. Spectral Doppler shows an  impaired relaxation pattern of left ventricular diastolic filling.  Left Atrium: The left atrium is normal in size.  Right Ventricle: The right ventricle is normal in size. There is normal right ventricular global systolic function.  Right Atrium: The right atrium is normal in size.  Aortic Valve: The aortic valve appears structurally normal. There is no evidence of aortic valve regurgitation. The peak instantaneous gradient of the aortic valve is 6.9 mmHg.  Mitral Valve: The mitral valve is mildly thickened. There is mild mitral valve regurgitation.  Tricuspid Valve: The tricuspid valve is structurally normal. There is mild tricuspid regurgitation.  Pulmonic Valve: The pulmonic valve is structurally normal. There is mild pulmonic valve regurgitation.  Pericardium: There is no pericardial effusion noted.  Aorta: The aortic root is normal.  In comparison to the previous echocardiogram(s): There are no prior studies on this patient for comparison purposes.  CONCLUSIONS:   1. Left ventricular systolic function is normal.   2. Spectral Doppler shows an impaired relaxation pattern of left ventricular diastolic filling.   3. There is moderate asymmetric left ventricular hypertrophy.    [facility] 3/15/24:    Pulmonary:  Remote hx of asthma- no inhalers since childhood  Patient is at increased risk of perioperative complications secondary to  age > 60, site of surgery, major surgery, duration of surgery > 2 hours, types of anesthetic  Stop Bang score is 2 placing patient at low risk for TOY  ARISCAT: >45 points, 42.1% risk of in-hospital postoperative pulmonary complication  PRODIGY: High risk for opioid induced respiratory depression  Pumonary toilet education discussed, patient also provided deep breathing exercises      Renal:   -recent BMP WNL  No renal diagnosis, however patient is at increase risk for perioperative renal complications secondary to  Age equal to or greater than 56, HTN, cardiac surgery  Pt at Moderate risk  for perioperative EMILY based on Dynamic Predictive Scoring Tool for Perioperative EMILY     Endocrine:  Hypothyroidism- continue synthroid  No further testing or intervention is indicated at this time.    Hematologic:  -T&S today  -recent CBC WNL  No hematologic diagnosis, however patient is at an increased risk for DVT  Caprini Score 9, patient at High risk for perioperative DVT.  Patient provided with VTE education/handout.    Gastrointestinal:   No GI diagnosis or significant findings on chart review or clinical presentation and evaluation.   Eat-10 score 0  Apfel 4    Infectious disease:   No infectious diagnosis or significant findings on chart review or clinical presentation and evaluation.   Prescription provided for CHG body wash and dental rinse. CHG use instructions reviewed and provided to patient.  Staph screen collected    Musculoskeletal:   No diagnosis or significant findings on chart review or clinical presentation and evaluation.     Anesthesia/Airway:  No anesthesia complications - patient denies family hx of MH or pseudocholinesterase deficiency       Medication instructions and NPO guidelines reviewed with the patient.  All questions or concerns discussed and addressed.      Patient seen and staffed with Dr. Teague

## 2025-02-03 NOTE — PREPROCEDURE INSTRUCTIONS
Thank you for visiting [facility] today for your pre-procedure evaluation, you were seen by Dr. Nichole [phone]     This summary includes instructions and information to aid you during your perioperative period.  Please read carefully. If you have any questions about your visit today, please call the number listed above.  If you become ill or have any changes to your health before your surgery, please contact your primary care provider and alert your surgeon.    Preparing for your Surgery       Exercises  Preoperative Deep Breathing Exercises  Why it is important to do deep breathing exercises before my surgery?  Deep breathing exercises strengthen your breathing muscles.  This helps you to recover after your surgery and decreases the chance of breathing complications.  How are the deep breathing exercises done?  Sit straight with your back supported.  Breathe in deeply and slowly through your nose. Your lower rib cage should expand and your abdomen may move forward.  Hold that breath for 3 to 5 seconds.  Breathe out through pursed lips, slowly and completely.  Rest and repeat 10 times every hour while awake.  Rest longer if you become dizzy or lightheaded.    Preoperative Brain Exercises    What are brain exercises?  A brain exercise is any activity that engages your thinking (cognitive) skills.    What types of activities are considered brain exercises?  Jigsaw puzzles, crossword puzzles, word jumble, memory games, word search, and many more.  Many can be found free online or on your phone via a mobile fifi.    Why should I do brain exercises before my surgery?  More recent research has shown brain exercise before surgery can lower the risk of postoperative delirium (confusion) which can be especially important for older adults.  Patients who did brain exercises for 5 to 10 hours the days before surgery, cut their risk of postoperative delirium in half up to 1 week after  surgery.    Sit-to-Stand Exercise    What is the sit-to-stand exercise?  The sit-to-stand exercise strengthens the muscles of your lower body and muscles in the center of your body (core muscles for stability) helping to maintain and improve your strength and mobility.  How do I do the sit-to-stand exercise?  The goal is to do this exercise without using your arms or hands.  If this is too difficult, use your arms and hands or a chair with armrests to help slowly push yourself to the standing position and lower yourself back to the sitting position. As the movement becomes easier use your arms and hands less.    Steps to the sit-to-stand exercise  Sit up tall in a sturdy chair, knees bent, feet flat on the floor shoulder-width apart.  Shift your hips/pelvis forward in the chair to correctly position yourself for the next movement.  Lean forward at your hips.  Stand up straight putting equal weight on both feet.  Check to be sure you are properly aligned with the chair, in a slow controlled movement sit back down.  Repeat this exercise 10-15 times.  If needed you can do it fewer times until your strength improves.  Rest for 1 minute.  Do another 10-15 sit-to-stand exercises.  Try to do this in the morning and evening.        Instructions    Preoperative Fasting Guidelines    Why must I stop eating and drinking near surgery time?  With sedation, food or liquid in your stomach can enter your lungs causing serious complications  Food can increase nausea and vomiting  When do I need to stop eating and drinking before my surgery?      Do not eat any food after midnight the night before your surgery/procedure. You may have up to 13.5 ounces of clear liquid until TWO hours before your instructed arrival time to the hospital.  This includes water, black tea/coffee, (no milk or cream) apple juice, and electrolyte drinks (Gatorade). You may chew gum until TWO hours before your surgery/procedure            Simple things you can  do to help prevent blood clots     Blood clots are blockages that can form in the body's veins. When a blood clot forms in your deep veins, it may be called a deep vein thrombosis, or DVT for short. Blood clots can happen in any part of the body where blood flows, but they are most common in the arms and legs. If a piece of a blood clot breaks free and travels to the lungs, it is called a pulmonary embolus (PE). A PE can be a very serious problem.         Being in the hospital or having surgery can raise your chances of getting a blood clot because you may not be well enough to move around as much as you normally do.         Ways you can help prevent blood clots in the hospital       Wearing SCDs  SCDs stands for Sequential Compression Devices.   SCDs are special sleeves that wrap around your legs. They attach to a pump that fills them with air to gently squeeze your legs every few minutes.  This helps return the blood in your legs to your heart.   SCDs should only be taken off when walking or bathing. SCDs may not be comfortable, but they can help save your life.              Pump SCD leg sleeves  Wearing compression stockings - if your doctor orders them. These special snug-fitting stockings gently squeeze your legs to help blood flow.       Walking. Walking helps move the blood in your legs.   If your doctor says it is ok, try walking the halls at least   5 times a day. Ask us to help you get up, so you don't fall.      Taking any blood-thinning medicines your doctor orders.              Ways you can help prevent blood clots at home         Wearing compression stockings - if your doctor orders them.   Walking - to help move the blood in your legs.    Taking any blood-thinning medicines your doctor orders.      Signs of a blood clot or PE    Tell your doctor or nurse right away if you have any of the problems listed below.         If you are at home, seek medical care right away. Call 911 for chest pain or  "problems breathing.            Signs of a blood clot (DVT) - such as pain, swelling, redness, or warmth in your arm or legs.  Signs of a pulmonary embolism (PE) - such as chest pain or feeling short of breath      Tobacco and Alcohol;  Do not drink alcohol or smoke within 24 hours of surgery.  It is best to quit smoking for as long as possible before any surgery or procedure.     Other Instructions  Why did I have my nose, under my arms, and groin swabbed? The purpose of the swab is to identify Staphylococcus aureus inside your nose or on your skin.  The swab was sent to the laboratory for culture.  A positive swab/culture for Staphylococcus aureus is called colonization or carriage.   What is Staphylococcus aureus? Staphylococcus aureus, also known as \"staph\", is a germ found on the skin or in the nose of healthy people.  Sometimes Staphylococcus aureus can get into the body and cause an infection.  This can be minor (such as pimples, boils, or other skin problems).  It might also be serious (such as a blood infection, pneumonia, or a surgical site infection). What is Staphylococcus aureus colonization or carriage? Colonization or carriage means that a person has the germ but is not sick from it.  These bacteria can be spread on the hands or when breathing or sneezing. How is Staphylococcus aureus spread? It is most often spread by close contact with a person or item that carries it. What happens if my culture is positive for Staphylococcus aureus? Your doctor/medical team will use this information to guide any antibiotic treatment which may be necessary.  Regardless of the culture results, we will clean the inside of your nose with a betadine swab just before you have your surgery. Will I get an infection if I have Staphylococcus aureus in my nose or on my skin? Anyone can get an infection with Staphylococcus aureus.  However, the best way to reduce your risk of infection is to follow the instructions provided to " you for the use of your CHG soap and dental rinse.  , Body Wash; What is a home preoperative antibacterial shower? This shower is a way of cleaning the skin with a germ-killing solution before surgery.  The solution contains chlorhexidine, commonly known as CHG.  CHG is a skin cleanser with germ-killing ability.  Let your doctor know if you are allergic to chlorhexidine. Why do I need to take a preoperative antibacterial shower? Skin is not sterile.  It is best to try to make your skin as free of germs as possible before surgery.  Proper cleansing with a germ-killing soap before surgery can lower the number of germs on your skin.  This helps to reduce the risk of infection at the surgical site.  Following the instructions listed below will help you prepare your skin for surgery.   How do I use the solution? Steps:  Begin using your CHG soap 5 days before your scheduled surgery on ___________.   First, wash and rinse your hair using the CHG soap. Keep CHG soap away from ear canals and eyes.  Rinse completely, do not condition.  Hair extensions should be removed. , Oral/Dental Rinse: What is oral/dental rinse?  It is a mouthwash. It is a way of cleaning the mouth with a germ-killing solution before your surgery.  The solution contains chlorhexidine, commonly known as CHG. It is used inside the mouth to kill a bacteria known as Staphylococcus aureus.  Let your doctor know if you are allergic to Chlorhexidine. Why do I need to use CHG oral/dental rinse? The CHG oral/dental rinse helps to kill a bacteria in your mouth known as Staphylococcus aureus.  This reduces the risk of infection at the surgical site.  Using your CHG oral/dental rinse STEPS: Use your CHG oral/dental rinse after you brush your teeth the night before (at bedtime) and the morning of your surgery.  Follow all directions on your prescription label.  Use the cap on the container to measure 15 ml.  Swish (gargle if you can) the mouthwash in your mouth for  at least 30 seconds, (do not swallow) and spit out.  After you use your CHG rinse, do not rinse your mouth with water, drink or eat.  Please refer to the prescription label for the appropriate time to resume oral intake What side effects might I have using the CHG oral/dental rinse? CHG rinse will stick to plaque on the teeth.  Brush and floss just before use.  Teeth brushing will help avoid staining of plaque during use.     The Week before Surgery        Seven days before Surgery  Check your [facility] medication instructions  Do the exercises provided to you by [facility]   Arrange for a responsible, adult licensed  to take you home after surgery and stay with you for 24 hours.  You will not be permitted to drive yourself home if you have received any anesthetic/sedation  Six days before surgery  Check your [facility] medication instructions  Do the exercises provided to you by [facility]   Start using Chlorhexidene (CHG) body wash if prescribed  Five days before surgery  Check your [facility] medication instructions  Do the exercises provided to you by [facility]   Continue to use CHG body wash if prescribed  Three days before surgery  Check your [facility] medication instructions  Do the exercises provided to you by [facility]   Continue to use CHG body wash if prescribed  Two days before surgery  Check your [facility] medication instructions  Do the exercises provided to you by [facility]   Continue to use CHG body wash if prescribed    The Day before Surgery       Check your [facility] medication and all other [facility] instructions including when to stop eating and drinking  You will be called with your arrival time for surgery in the late afternoon.  If you do not receive a call please reach out to your surgeon's office.  Do not smoke or drink 24 hours before surgery  Prepare items to bring with you to the hospital  Shower with your chlorhexidine wash if prescribed  Brush your teeth and use your chlorhexidine dental rinse if prescribed    The Day of Surgery       Check your [facility]  medication instructions  Ensure you follow the instructions for when to stop eating and drinking  Shower, if prescribed use CHG.  Do not apply any lotions, creams, moisturizers, perfume or deodorant  Brush your teeth and use your CHG dental rinse if prescribed  Wear loose comfortable clothing  Avoid make-up  Remove  jewelry and piercings, consider professional piercing removal with a plastic spacer if needed  Bring photo ID and Insurance card  Bring an accurate medication list that includes medication dose, frequency and allergies  Bring a copy of your advanced directives (will, health care power of )  Bring any devices and controllers as well as medical devices you have been provided with for surgery (CPAP, slings, braces, etc.)  Dentures, eyeglasses, and contacts will be removed before surgery, please bring cases for contacts or glasses

## 2025-02-04 LAB
APPEARANCE UR: CLEAR
BILIRUB UR STRIP.AUTO-MCNC: NEGATIVE MG/DL
CAOX CRY #/AREA UR COMP ASSIST: ABNORMAL /HPF
COLOR UR: YELLOW
GLUCOSE UR STRIP.AUTO-MCNC: NORMAL MG/DL
HOLD SPECIMEN: NORMAL
KETONES UR STRIP.AUTO-MCNC: NEGATIVE MG/DL
LEUKOCYTE ESTERASE UR QL STRIP.AUTO: ABNORMAL
MUCOUS THREADS #/AREA URNS AUTO: ABNORMAL /LPF
NITRITE UR QL STRIP.AUTO: NEGATIVE
PH UR STRIP.AUTO: 5.5 [PH]
PROT UR STRIP.AUTO-MCNC: NEGATIVE MG/DL
RBC # UR STRIP.AUTO: NEGATIVE /UL
RBC #/AREA URNS AUTO: ABNORMAL /HPF
SP GR UR STRIP.AUTO: 1.01
SQUAMOUS #/AREA URNS AUTO: ABNORMAL /HPF
UROBILINOGEN UR STRIP.AUTO-MCNC: NORMAL MG/DL
WBC #/AREA URNS AUTO: ABNORMAL /HPF

## 2025-02-05 LAB
BACTERIA UR CULT: NORMAL
STAPHYLOCOCCUS SPEC CULT: NORMAL

## 2025-02-10 ENCOUNTER — HOSPITAL ENCOUNTER (OUTPATIENT)
Dept: VASCULAR MEDICINE | Facility: CLINIC | Age: 75
Discharge: HOME | End: 2025-02-10
Payer: MEDICARE

## 2025-02-10 ENCOUNTER — HOSPITAL ENCOUNTER (OUTPATIENT)
Dept: RADIOLOGY | Facility: CLINIC | Age: 75
Discharge: HOME | End: 2025-02-10
Payer: MEDICARE

## 2025-02-10 DIAGNOSIS — R94.2 ABNORMAL RESULTS OF PULMONARY FUNCTION STUDIES: ICD-10-CM

## 2025-02-10 DIAGNOSIS — I25.10 CAD IN NATIVE ARTERY: ICD-10-CM

## 2025-02-10 DIAGNOSIS — Z01.818 ENCOUNTER FOR OTHER PREPROCEDURAL EXAMINATION: ICD-10-CM

## 2025-02-10 DIAGNOSIS — I65.23 BILATERAL CAROTID ARTERY STENOSIS: ICD-10-CM

## 2025-02-10 DIAGNOSIS — I83.893 VARICOSE VEINS OF BILATERAL LOWER EXTREMITIES WITH OTHER COMPLICATIONS: ICD-10-CM

## 2025-02-10 PROCEDURE — 93970 EXTREMITY STUDY: CPT | Performed by: SURGERY

## 2025-02-10 PROCEDURE — 71250 CT THORAX DX C-: CPT | Performed by: RADIOLOGY

## 2025-02-10 PROCEDURE — 93970 EXTREMITY STUDY: CPT

## 2025-02-10 PROCEDURE — 93880 EXTRACRANIAL BILAT STUDY: CPT | Performed by: SURGERY

## 2025-02-10 PROCEDURE — 93880 EXTRACRANIAL BILAT STUDY: CPT

## 2025-02-10 PROCEDURE — 71250 CT THORAX DX C-: CPT

## 2025-02-11 NOTE — PROGRESS NOTES
Pharmacy Medication History Review    Tracy Santoyo is a 74 y.o. female who is planned to be admitted for Coronary artery disease of native artery of native heart with stable angina pectoris. Pharmacy called the patient prior to their scheduled procedure and reviewed the patient's [id number] medications for accuracy.    Medications ADDED:  none  Medications CHANGED:  none  Medications REMOVED:   miralax    Please review updated prior to admission medication list and comments regarding how patient may be taking medications differently by going to Admission tab --> Admission Orders --> Admit Orders / Review prior to admission medications.     Preferred pharmacy, last doses of medications, and allergies to be confirmed with patient by nursing the day of procedure.     Sources used to complete the med history include:  Boostable  Pharmacy dispense history  Patient interview  Chart Review  Care Everywhere     Below are additional concerns with the patient's PTA list.  Patient states they are taking #1 capsule of macrodantin 50mg every other day. L.F. 07/15/24 #90/90d. Prescription states #1QD    Jacy Martinez [facility]  Please reach out via Secure Chat for questions

## 2025-02-12 ENCOUNTER — ANESTHESIA (OUTPATIENT)
Dept: OPERATING ROOM | Facility: HOSPITAL | Age: 75
End: 2025-02-12
Payer: MEDICARE

## 2025-02-12 ENCOUNTER — APPOINTMENT (OUTPATIENT)
Dept: RADIOLOGY | Facility: HOSPITAL | Age: 75
DRG: 236 | End: 2025-02-12
Payer: MEDICARE

## 2025-02-12 ENCOUNTER — APPOINTMENT (OUTPATIENT)
Dept: CARDIOLOGY | Facility: HOSPITAL | Age: 75
DRG: 236 | End: 2025-02-12
Payer: MEDICARE

## 2025-02-12 ENCOUNTER — HOSPITAL ENCOUNTER (INPATIENT)
Facility: HOSPITAL | Age: 75
DRG: 236 | End: 2025-02-12
Attending: THORACIC SURGERY (CARDIOTHORACIC VASCULAR SURGERY) | Admitting: THORACIC SURGERY (CARDIOTHORACIC VASCULAR SURGERY)
Payer: MEDICARE

## 2025-02-12 ENCOUNTER — HOSPITAL ENCOUNTER (OUTPATIENT)
Dept: OPERATING ROOM | Facility: HOSPITAL | Age: 75
Discharge: HOME | End: 2025-02-12

## 2025-02-12 DIAGNOSIS — D62 ACUTE BLOOD LOSS AS CAUSE OF POSTOPERATIVE ANEMIA: ICD-10-CM

## 2025-02-12 DIAGNOSIS — I25.10 CAD IN NATIVE ARTERY: Primary | ICD-10-CM

## 2025-02-12 DIAGNOSIS — I25.118 CORONARY ARTERY DISEASE OF NATIVE ARTERY OF NATIVE HEART WITH STABLE ANGINA PECTORIS: ICD-10-CM

## 2025-02-12 DIAGNOSIS — Z95.1 S/P CABG X 1: ICD-10-CM

## 2025-02-12 DIAGNOSIS — I48.0 PAROXYSMAL ATRIAL FIBRILLATION (MULTI): ICD-10-CM

## 2025-02-12 DIAGNOSIS — I25.721 ATHEROSCLEROSIS OF AUTOLOGOUS ARTERY CORONARY ARTERY BYPASS GRAFT(S) WITH ANGINA PECTORIS WITH DOCUMENTED SPASM: ICD-10-CM

## 2025-02-12 LAB
ABO GROUP (TYPE) IN BLOOD: NORMAL
ACT BLD: 112 SEC (ref 82–174)
ACT BLD: 118 SEC (ref 82–174)
ACT BLD: 302 SEC (ref 82–174)
ACT BLD: 363 SEC (ref 82–174)
ALBUMIN SERPL BCP-MCNC: 4.1 G/DL (ref 3.4–5)
ANION GAP BLDA CALCULATED.4IONS-SCNC: 10 MMO/L (ref 10–25)
ANION GAP BLDA CALCULATED.4IONS-SCNC: 10 MMO/L (ref 10–25)
ANION GAP BLDA CALCULATED.4IONS-SCNC: 11 MMO/L (ref 10–25)
ANION GAP BLDA CALCULATED.4IONS-SCNC: 13 MMO/L (ref 10–25)
ANION GAP BLDA CALCULATED.4IONS-SCNC: 13 MMO/L (ref 10–25)
ANION GAP BLDA CALCULATED.4IONS-SCNC: 15 MMO/L (ref 10–25)
ANION GAP BLDA CALCULATED.4IONS-SCNC: 15 MMO/L (ref 10–25)
ANION GAP SERPL CALC-SCNC: 13 MMOL/L (ref 10–20)
APTT PPP: 26 SECONDS (ref 27–38)
BASE EXCESS BLDA CALC-SCNC: -2.8 MMOL/L (ref -2–3)
BASE EXCESS BLDA CALC-SCNC: -3 MMOL/L (ref -2–3)
BASE EXCESS BLDA CALC-SCNC: -3.2 MMOL/L (ref -2–3)
BASE EXCESS BLDA CALC-SCNC: -4.3 MMOL/L (ref -2–3)
BASE EXCESS BLDA CALC-SCNC: 0.5 MMOL/L (ref -2–3)
BODY TEMPERATURE: 37 DEGREES CELSIUS
BUN SERPL-MCNC: 14 MG/DL (ref 6–23)
CA-I BLD-SCNC: 1 MMOL/L (ref 1.1–1.33)
CA-I BLDA-SCNC: 1.02 MMOL/L (ref 1.1–1.33)
CA-I BLDA-SCNC: 1.06 MMOL/L (ref 1.1–1.33)
CA-I BLDA-SCNC: 1.08 MMOL/L (ref 1.1–1.33)
CA-I BLDA-SCNC: 1.1 MMOL/L (ref 1.1–1.33)
CA-I BLDA-SCNC: 1.12 MMOL/L (ref 1.1–1.33)
CA-I BLDA-SCNC: 1.19 MMOL/L (ref 1.1–1.33)
CA-I BLDA-SCNC: 1.23 MMOL/L (ref 1.1–1.33)
CALCIUM SERPL-MCNC: 8 MG/DL (ref 8.6–10.6)
CFT FORM KAOLIN IND BLD RES TEG: 0.9 MIN (ref 0.8–2.1)
CFT FORM KAOLIN IND BLD RES TEG: 2.5 MIN (ref 0.8–2.1)
CHLORIDE BLDA-SCNC: 104 MMOL/L (ref 98–107)
CHLORIDE BLDA-SCNC: 104 MMOL/L (ref 98–107)
CHLORIDE BLDA-SCNC: 105 MMOL/L (ref 98–107)
CHLORIDE BLDA-SCNC: 105 MMOL/L (ref 98–107)
CHLORIDE BLDA-SCNC: 106 MMOL/L (ref 98–107)
CHLORIDE BLDA-SCNC: 107 MMOL/L (ref 98–107)
CHLORIDE BLDA-SCNC: 108 MMOL/L (ref 98–107)
CHLORIDE SERPL-SCNC: 106 MMOL/L (ref 98–107)
CLOT ANGLE.KAOLIN INDUCED BLD RES TEG: 61 DEG (ref 63–78)
CLOT ANGLE.KAOLIN INDUCED BLD RES TEG: 77 DEG (ref 63–78)
CLOT INIT KAO IND P HEP NEUT BLD RES TEG: 5.3 MIN (ref 4.3–8.3)
CLOT INIT KAO IND P HEP NEUT BLD RES TEG: 5.9 MIN (ref 4.6–9.1)
CLOT INIT KAO IND P HEP NEUT BLD RES TEG: 6.3 MIN (ref 4.6–9.1)
CLOT INIT KAO IND P HEP NEUT BLD RES TEG: 7.3 MIN (ref 4.3–8.3)
CO2 SERPL-SCNC: 22 MMOL/L (ref 21–32)
COHGB MFR BLDA: 1.3 %
COHGB MFR BLDA: 1.3 %
COHGB MFR BLDA: 1.4 %
COHGB MFR BLDA: 1.4 %
CREAT SERPL-MCNC: 0.59 MG/DL (ref 0.5–1.05)
DO-HGB MFR BLDA: 0 % (ref 0–5)
DO-HGB MFR BLDA: 0.1 % (ref 0–5)
DO-HGB MFR BLDA: 0.4 % (ref 0–5)
DO-HGB MFR BLDA: 0.5 % (ref 0–5)
EGFRCR SERPLBLD CKD-EPI 2021: >90 ML/MIN/1.73M*2
EJECTION FRACTION APICAL 4 CHAMBER: 63.7
EJECTION FRACTION: 65 %
ERYTHROCYTE [DISTWIDTH] IN BLOOD BY AUTOMATED COUNT: 14 % (ref 11.5–14.5)
FIBRINOGEN BLD CALC-MCNC: 434 MG/DL (ref 278–581)
FIBRINOGEN BLD CALC-MCNC: 551 MG/DL (ref 278–581)
FIBRINOGEN PPP-MCNC: 260 MG/DL (ref 200–400)
GLUCOSE BLD MANUAL STRIP-MCNC: 181 MG/DL (ref 74–99)
GLUCOSE BLDA-MCNC: 100 MG/DL (ref 74–99)
GLUCOSE BLDA-MCNC: 159 MG/DL (ref 74–99)
GLUCOSE BLDA-MCNC: 168 MG/DL (ref 74–99)
GLUCOSE BLDA-MCNC: 171 MG/DL (ref 74–99)
GLUCOSE BLDA-MCNC: 172 MG/DL (ref 74–99)
GLUCOSE BLDA-MCNC: 175 MG/DL (ref 74–99)
GLUCOSE BLDA-MCNC: 186 MG/DL (ref 74–99)
GLUCOSE SERPL-MCNC: 161 MG/DL (ref 74–99)
HCO3 BLDA-SCNC: 21.6 MMOL/L (ref 22–26)
HCO3 BLDA-SCNC: 21.6 MMOL/L (ref 22–26)
HCO3 BLDA-SCNC: 21.9 MMOL/L (ref 22–26)
HCO3 BLDA-SCNC: 22.1 MMOL/L (ref 22–26)
HCO3 BLDA-SCNC: 22.7 MMOL/L (ref 22–26)
HCO3 BLDA-SCNC: 22.7 MMOL/L (ref 22–26)
HCO3 BLDA-SCNC: 24.6 MMOL/L (ref 22–26)
HCT VFR BLD AUTO: 33.6 % (ref 36–46)
HCT VFR BLD EST: 35 % (ref 36–46)
HCT VFR BLD EST: 36 % (ref 36–46)
HCT VFR BLD EST: 36 % (ref 36–46)
HCT VFR BLD EST: 37 % (ref 36–46)
HCT VFR BLD EST: 38 % (ref 36–46)
HCT VFR BLD EST: 38 % (ref 36–46)
HCT VFR BLD EST: 39 % (ref 36–46)
HGB BLD-MCNC: 11.6 G/DL (ref 12–16)
HGB BLDA-MCNC: 11.8 G/DL (ref 12–16)
HGB BLDA-MCNC: 12 G/DL (ref 12–16)
HGB BLDA-MCNC: 12 G/DL (ref 12–16)
HGB BLDA-MCNC: 12.1 G/DL (ref 12–16)
HGB BLDA-MCNC: 12.2 G/DL (ref 12–16)
HGB BLDA-MCNC: 12.7 G/DL (ref 12–16)
HGB BLDA-MCNC: 13 G/DL (ref 12–16)
HGB BLDA-MCNC: 13 G/DL (ref 12–16)
INHALED O2 CONCENTRATION: 100 %
INHALED O2 CONCENTRATION: 30 %
INHALED O2 CONCENTRATION: 33 %
INHALED O2 CONCENTRATION: 40 %
INHALED O2 CONCENTRATION: 50 %
INR PPP: 1.2 (ref 0.9–1.1)
LACTATE BLDA-SCNC: 0.8 MMOL/L (ref 0.4–2)
LACTATE BLDA-SCNC: 1.4 MMOL/L (ref 0.4–2)
LACTATE BLDA-SCNC: 1.8 MMOL/L (ref 0.4–2)
LACTATE BLDA-SCNC: 2.1 MMOL/L (ref 0.4–2)
LACTATE BLDA-SCNC: 2.4 MMOL/L (ref 0.4–2)
LACTATE BLDA-SCNC: 2.4 MMOL/L (ref 0.4–2)
LACTATE BLDA-SCNC: 2.7 MMOL/L (ref 0.4–2)
MA KAOLIN BLD RES TEG: 61 MM (ref 52–69)
MA KAOLIN BLD RES TEG: 66 MM (ref 52–69)
MA KAOLIN+TF BLD RES TEG: 64 MM (ref 52–70)
MA KAOLIN+TF BLD RES TEG: 67 MM (ref 52–70)
MA TF IND+IIB-IIIA INH BLD RES TEG: 24 MM (ref 15–32)
MA TF IND+IIB-IIIA INH BLD RES TEG: 30 MM (ref 15–32)
MAGNESIUM SERPL-MCNC: 2.08 MG/DL (ref 1.6–2.4)
MCH RBC QN AUTO: 30.5 PG (ref 26–34)
MCHC RBC AUTO-ENTMCNC: 34.5 G/DL (ref 32–36)
MCV RBC AUTO: 88 FL (ref 80–100)
METHGB MFR BLDA: 0.2 % (ref 0–1.5)
METHGB MFR BLDA: 0.4 % (ref 0–1.5)
METHGB MFR BLDA: 0.6 % (ref 0–1.5)
METHGB MFR BLDA: 0.9 % (ref 0–1.5)
NRBC BLD-RTO: 0 /100 WBCS (ref 0–0)
OXYHGB MFR BLDA: 97 % (ref 94–98)
OXYHGB MFR BLDA: 97.1 % (ref 94–98)
OXYHGB MFR BLDA: 97.2 % (ref 94–98)
OXYHGB MFR BLDA: 97.3 % (ref 94–98)
OXYHGB MFR BLDA: 97.3 % (ref 94–98)
OXYHGB MFR BLDA: 97.9 % (ref 94–98)
OXYHGB MFR BLDA: 97.9 % (ref 94–98)
OXYHGB MFR BLDA: 98 % (ref 94–98)
OXYHGB MFR BLDA: 98 % (ref 94–98)
OXYHGB MFR BLDA: 98.3 % (ref 94–98)
OXYHGB MFR BLDA: 98.3 % (ref 94–98)
PCO2 BLDA: 37 MM HG (ref 38–42)
PCO2 BLDA: 37 MM HG (ref 38–42)
PCO2 BLDA: 42 MM HG (ref 38–42)
PCO2 BLDA: 43 MM HG (ref 38–42)
PCO2 BLDA: 45 MM HG (ref 38–42)
PH BLDA: 7.3 PH (ref 7.38–7.42)
PH BLDA: 7.32 PH (ref 7.38–7.42)
PH BLDA: 7.32 PH (ref 7.38–7.42)
PH BLDA: 7.33 PH (ref 7.38–7.42)
PH BLDA: 7.34 PH (ref 7.38–7.42)
PH BLDA: 7.38 PH (ref 7.38–7.42)
PH BLDA: 7.43 PH (ref 7.38–7.42)
PHOSPHATE SERPL-MCNC: 2.6 MG/DL (ref 2.5–4.9)
PLATELET # BLD AUTO: 195 X10*3/UL (ref 150–450)
PO2 BLDA: 105 MM HG (ref 85–95)
PO2 BLDA: 113 MM HG (ref 85–95)
PO2 BLDA: 125 MM HG (ref 85–95)
PO2 BLDA: 126 MM HG (ref 85–95)
PO2 BLDA: 141 MM HG (ref 85–95)
PO2 BLDA: 146 MM HG (ref 85–95)
PO2 BLDA: 261 MM HG (ref 85–95)
POTASSIUM BLDA-SCNC: 3 MMOL/L (ref 3.5–5.3)
POTASSIUM BLDA-SCNC: 3.1 MMOL/L (ref 3.5–5.3)
POTASSIUM BLDA-SCNC: 3.1 MMOL/L (ref 3.5–5.3)
POTASSIUM BLDA-SCNC: 3.2 MMOL/L (ref 3.5–5.3)
POTASSIUM BLDA-SCNC: 3.6 MMOL/L (ref 3.5–5.3)
POTASSIUM BLDA-SCNC: 3.9 MMOL/L (ref 3.5–5.3)
POTASSIUM BLDA-SCNC: 4.7 MMOL/L (ref 3.5–5.3)
POTASSIUM SERPL-SCNC: 3.4 MMOL/L (ref 3.5–5.3)
PROTHROMBIN TIME: 13.3 SECONDS (ref 9.8–12.8)
RBC # BLD AUTO: 3.8 X10*6/UL (ref 4–5.2)
RH FACTOR (ANTIGEN D): NORMAL
SAO2 % BLDA: 100 % (ref 94–100)
SAO2 % BLDA: 99 % (ref 94–100)
SODIUM BLDA-SCNC: 136 MMOL/L (ref 136–145)
SODIUM BLDA-SCNC: 136 MMOL/L (ref 136–145)
SODIUM BLDA-SCNC: 137 MMOL/L (ref 136–145)
SODIUM BLDA-SCNC: 138 MMOL/L (ref 136–145)
SODIUM BLDA-SCNC: 138 MMOL/L (ref 136–145)
SODIUM SERPL-SCNC: 138 MMOL/L (ref 136–145)
TEST COMMENT: ABNORMAL
TEST COMMENT: NORMAL
WBC # BLD AUTO: 21.5 X10*3/UL (ref 4.4–11.3)

## 2025-02-12 PROCEDURE — 85027 COMPLETE CBC AUTOMATED: CPT

## 2025-02-12 PROCEDURE — 83735 ASSAY OF MAGNESIUM: CPT

## 2025-02-12 PROCEDURE — 2500000005 HC RX 250 GENERAL PHARMACY W/O HCPCS: Performed by: THORACIC SURGERY (CARDIOTHORACIC VASCULAR SURGERY)

## 2025-02-12 PROCEDURE — 82330 ASSAY OF CALCIUM: CPT

## 2025-02-12 PROCEDURE — 99291 CRITICAL CARE FIRST HOUR: CPT

## 2025-02-12 PROCEDURE — 2500000004 HC RX 250 GENERAL PHARMACY W/ HCPCS (ALT 636 FOR OP/ED): Performed by: ANESTHESIOLOGY

## 2025-02-12 PROCEDURE — 76937 US GUIDE VASCULAR ACCESS: CPT | Performed by: ANESTHESIOLOGY

## 2025-02-12 PROCEDURE — A4312 CATH W/O BAG 2-WAY SILICONE: HCPCS | Performed by: THORACIC SURGERY (CARDIOTHORACIC VASCULAR SURGERY)

## 2025-02-12 PROCEDURE — 3700000002 HC GENERAL ANESTHESIA TIME - EACH INCREMENTAL 1 MINUTE: Performed by: THORACIC SURGERY (CARDIOTHORACIC VASCULAR SURGERY)

## 2025-02-12 PROCEDURE — 84132 ASSAY OF SERUM POTASSIUM: CPT

## 2025-02-12 PROCEDURE — 3700000001 HC GENERAL ANESTHESIA TIME - INITIAL BASE CHARGE: Performed by: THORACIC SURGERY (CARDIOTHORACIC VASCULAR SURGERY)

## 2025-02-12 PROCEDURE — 85347 COAGULATION TIME ACTIVATED: CPT

## 2025-02-12 PROCEDURE — 33533 CABG ARTERIAL SINGLE: CPT | Performed by: THORACIC SURGERY (CARDIOTHORACIC VASCULAR SURGERY)

## 2025-02-12 PROCEDURE — 3600000011 HC PERFUSION TIME - INITIAL BASE CHARGE: Performed by: THORACIC SURGERY (CARDIOTHORACIC VASCULAR SURGERY)

## 2025-02-12 PROCEDURE — 3600000018 HC OR TIME - INITIAL BASE CHARGE - PROCEDURE LEVEL SIX: Performed by: THORACIC SURGERY (CARDIOTHORACIC VASCULAR SURGERY)

## 2025-02-12 PROCEDURE — 85610 PROTHROMBIN TIME: CPT

## 2025-02-12 PROCEDURE — 2500000002 HC RX 250 W HCPCS SELF ADMINISTERED DRUGS (ALT 637 FOR MEDICARE OP, ALT 636 FOR OP/ED)

## 2025-02-12 PROCEDURE — 71045 X-RAY EXAM CHEST 1 VIEW: CPT

## 2025-02-12 PROCEDURE — 2500000004 HC RX 250 GENERAL PHARMACY W/ HCPCS (ALT 636 FOR OP/ED): Performed by: THORACIC SURGERY (CARDIOTHORACIC VASCULAR SURGERY)

## 2025-02-12 PROCEDURE — 3600000017 HC OR TIME - EACH INCREMENTAL 1 MINUTE - PROCEDURE LEVEL SIX: Performed by: THORACIC SURGERY (CARDIOTHORACIC VASCULAR SURGERY)

## 2025-02-12 PROCEDURE — 3600000012 HC PERFUSION TIME - EACH INCREMENTAL 1 MINUTE: Performed by: THORACIC SURGERY (CARDIOTHORACIC VASCULAR SURGERY)

## 2025-02-12 PROCEDURE — 36415 COLL VENOUS BLD VENIPUNCTURE: CPT | Performed by: ANESTHESIOLOGY

## 2025-02-12 PROCEDURE — 2500000004 HC RX 250 GENERAL PHARMACY W/ HCPCS (ALT 636 FOR OP/ED)

## 2025-02-12 PROCEDURE — 83050 HGB METHEMOGLOBIN QUAN: CPT

## 2025-02-12 PROCEDURE — 85384 FIBRINOGEN ACTIVITY: CPT

## 2025-02-12 PROCEDURE — 2020000001 HC ICU ROOM DAILY

## 2025-02-12 PROCEDURE — P9045 ALBUMIN (HUMAN), 5%, 250 ML: HCPCS | Mod: JZ,TB

## 2025-02-12 PROCEDURE — 8E0W0CZ ROBOTIC ASSISTED PROCEDURE OF TRUNK REGION, OPEN APPROACH: ICD-10-PCS | Performed by: THORACIC SURGERY (CARDIOTHORACIC VASCULAR SURGERY)

## 2025-02-12 PROCEDURE — 2500000001 HC RX 250 WO HCPCS SELF ADMINISTERED DRUGS (ALT 637 FOR MEDICARE OP)

## 2025-02-12 PROCEDURE — 2500000005 HC RX 250 GENERAL PHARMACY W/O HCPCS

## 2025-02-12 PROCEDURE — 71045 X-RAY EXAM CHEST 1 VIEW: CPT | Performed by: RADIOLOGY

## 2025-02-12 PROCEDURE — 86923 COMPATIBILITY TEST ELECTRIC: CPT

## 2025-02-12 PROCEDURE — 36556 INSERT NON-TUNNEL CV CATH: CPT | Performed by: ANESTHESIOLOGY

## 2025-02-12 PROCEDURE — 82375 ASSAY CARBOXYHB QUANT: CPT

## 2025-02-12 PROCEDURE — 36620 INSERTION CATHETER ARTERY: CPT | Performed by: ANESTHESIOLOGY

## 2025-02-12 PROCEDURE — 93010 ELECTROCARDIOGRAM REPORT: CPT | Performed by: INTERNAL MEDICINE

## 2025-02-12 PROCEDURE — 02100Z9 BYPASS CORONARY ARTERY, ONE ARTERY FROM LEFT INTERNAL MAMMARY, OPEN APPROACH: ICD-10-PCS | Performed by: THORACIC SURGERY (CARDIOTHORACIC VASCULAR SURGERY)

## 2025-02-12 PROCEDURE — 93005 ELECTROCARDIOGRAM TRACING: CPT

## 2025-02-12 PROCEDURE — 2720000007 HC OR 272 NO HCPCS: Performed by: THORACIC SURGERY (CARDIOTHORACIC VASCULAR SURGERY)

## 2025-02-12 PROCEDURE — 37799 UNLISTED PX VASCULAR SURGERY: CPT

## 2025-02-12 PROCEDURE — 2500000005 HC RX 250 GENERAL PHARMACY W/O HCPCS: Performed by: ANESTHESIOLOGY

## 2025-02-12 PROCEDURE — 82947 ASSAY GLUCOSE BLOOD QUANT: CPT

## 2025-02-12 PROCEDURE — 94002 VENT MGMT INPAT INIT DAY: CPT

## 2025-02-12 RX ORDER — NAPROXEN SODIUM 220 MG/1
81 TABLET, FILM COATED ORAL DAILY
Status: DISCONTINUED | OUTPATIENT
Start: 2025-02-12 | End: 2025-02-17 | Stop reason: HOSPADM

## 2025-02-12 RX ORDER — FENTANYL CITRATE 50 UG/ML
INJECTION, SOLUTION INTRAMUSCULAR; INTRAVENOUS AS NEEDED
Status: DISCONTINUED | OUTPATIENT
Start: 2025-02-12 | End: 2025-02-12

## 2025-02-12 RX ORDER — PAPAVERINE HYDROCHLORIDE 30 MG/ML
INJECTION INTRAMUSCULAR; INTRAVENOUS AS NEEDED
Status: DISCONTINUED | OUTPATIENT
Start: 2025-02-12 | End: 2025-02-12 | Stop reason: HOSPADM

## 2025-02-12 RX ORDER — POLYETHYLENE GLYCOL 3350 17 G/17G
17 POWDER, FOR SOLUTION ORAL DAILY
Status: DISCONTINUED | OUTPATIENT
Start: 2025-02-12 | End: 2025-02-13

## 2025-02-12 RX ORDER — ONDANSETRON HYDROCHLORIDE 2 MG/ML
4 INJECTION, SOLUTION INTRAVENOUS EVERY 8 HOURS PRN
Status: DISCONTINUED | OUTPATIENT
Start: 2025-02-12 | End: 2025-02-13

## 2025-02-12 RX ORDER — POTASSIUM CHLORIDE 14.9 MG/ML
INJECTION INTRAVENOUS AS NEEDED
Status: DISCONTINUED | OUTPATIENT
Start: 2025-02-12 | End: 2025-02-12

## 2025-02-12 RX ORDER — NOREPINEPHRINE BITARTRATE/D5W 8 MG/250ML
PLASTIC BAG, INJECTION (ML) INTRAVENOUS CONTINUOUS PRN
Status: DISCONTINUED | OUTPATIENT
Start: 2025-02-12 | End: 2025-02-12

## 2025-02-12 RX ORDER — NITROGLYCERIN 20 MG/100ML
5-200 INJECTION INTRAVENOUS CONTINUOUS
Status: DISCONTINUED | OUTPATIENT
Start: 2025-02-12 | End: 2025-02-12

## 2025-02-12 RX ORDER — HYDRALAZINE HYDROCHLORIDE 20 MG/ML
10 INJECTION INTRAMUSCULAR; INTRAVENOUS EVERY 4 HOURS PRN
Status: DISCONTINUED | OUTPATIENT
Start: 2025-02-12 | End: 2025-02-13

## 2025-02-12 RX ORDER — EPINEPHRINE IN 0.9 % SOD CHLOR 4MG/250ML
PLASTIC BAG, INJECTION (ML) INTRAVENOUS CONTINUOUS PRN
Status: DISCONTINUED | OUTPATIENT
Start: 2025-02-12 | End: 2025-02-12

## 2025-02-12 RX ORDER — OXYCODONE HYDROCHLORIDE 5 MG/1
5 TABLET ORAL EVERY 4 HOURS PRN
Status: DISCONTINUED | OUTPATIENT
Start: 2025-02-12 | End: 2025-02-17

## 2025-02-12 RX ORDER — SODIUM CHLORIDE 9 MG/ML
INJECTION, SOLUTION INTRAVENOUS CONTINUOUS PRN
Status: DISCONTINUED | OUTPATIENT
Start: 2025-02-12 | End: 2025-02-12

## 2025-02-12 RX ORDER — CALCIUM GLUCONATE 20 MG/ML
2 INJECTION, SOLUTION INTRAVENOUS EVERY 6 HOURS PRN
Status: DISCONTINUED | OUTPATIENT
Start: 2025-02-12 | End: 2025-02-13

## 2025-02-12 RX ORDER — SODIUM CHLORIDE, SODIUM GLUCONATE, SODIUM ACETATE, POTASSIUM CHLORIDE AND MAGNESIUM CHLORIDE 30; 37; 368; 526; 502 MG/100ML; MG/100ML; MG/100ML; MG/100ML; MG/100ML
INJECTION, SOLUTION INTRAVENOUS CONTINUOUS PRN
Status: DISCONTINUED | OUTPATIENT
Start: 2025-02-12 | End: 2025-02-12

## 2025-02-12 RX ORDER — HYDROMORPHONE HYDROCHLORIDE 0.2 MG/ML
0.2 INJECTION INTRAMUSCULAR; INTRAVENOUS; SUBCUTANEOUS
Status: DISCONTINUED | OUTPATIENT
Start: 2025-02-12 | End: 2025-02-12

## 2025-02-12 RX ORDER — ROCURONIUM BROMIDE 10 MG/ML
INJECTION, SOLUTION INTRAVENOUS AS NEEDED
Status: DISCONTINUED | OUTPATIENT
Start: 2025-02-12 | End: 2025-02-12

## 2025-02-12 RX ORDER — POTASSIUM CHLORIDE 14.9 MG/ML
20 INJECTION INTRAVENOUS EVERY 6 HOURS PRN
Status: DISCONTINUED | OUTPATIENT
Start: 2025-02-12 | End: 2025-02-13

## 2025-02-12 RX ORDER — PROPOFOL 10 MG/ML
0-50 INJECTION, EMULSION INTRAVENOUS CONTINUOUS
Status: DISCONTINUED | OUTPATIENT
Start: 2025-02-12 | End: 2025-02-12

## 2025-02-12 RX ORDER — MIDAZOLAM HYDROCHLORIDE 1 MG/ML
INJECTION INTRAMUSCULAR; INTRAVENOUS AS NEEDED
Status: DISCONTINUED | OUTPATIENT
Start: 2025-02-12 | End: 2025-02-12

## 2025-02-12 RX ORDER — PANTOPRAZOLE SODIUM 40 MG/10ML
40 INJECTION, POWDER, LYOPHILIZED, FOR SOLUTION INTRAVENOUS
Status: DISCONTINUED | OUTPATIENT
Start: 2025-02-13 | End: 2025-02-13

## 2025-02-12 RX ORDER — INSULIN LISPRO 100 [IU]/ML
0-15 INJECTION, SOLUTION INTRAVENOUS; SUBCUTANEOUS EVERY 4 HOURS
Status: DISCONTINUED | OUTPATIENT
Start: 2025-02-12 | End: 2025-02-13

## 2025-02-12 RX ORDER — METOCLOPRAMIDE HYDROCHLORIDE 5 MG/ML
10 INJECTION INTRAMUSCULAR; INTRAVENOUS EVERY 6 HOURS PRN
Status: DISCONTINUED | OUTPATIENT
Start: 2025-02-12 | End: 2025-02-17

## 2025-02-12 RX ORDER — CALCIUM GLUCONATE 20 MG/ML
1 INJECTION, SOLUTION INTRAVENOUS EVERY 6 HOURS PRN
Status: DISCONTINUED | OUTPATIENT
Start: 2025-02-12 | End: 2025-02-13

## 2025-02-12 RX ORDER — NITROGLYCERIN 20 MG/100ML
0-200 INJECTION INTRAVENOUS CONTINUOUS
Status: DISCONTINUED | OUTPATIENT
Start: 2025-02-12 | End: 2025-02-13

## 2025-02-12 RX ORDER — ACETAMINOPHEN 325 MG/1
650 TABLET ORAL EVERY 6 HOURS
Status: DISCONTINUED | OUTPATIENT
Start: 2025-02-12 | End: 2025-02-12

## 2025-02-12 RX ORDER — ESMOLOL HYDROCHLORIDE 10 MG/ML
INJECTION INTRAVENOUS AS NEEDED
Status: DISCONTINUED | OUTPATIENT
Start: 2025-02-12 | End: 2025-02-12

## 2025-02-12 RX ORDER — INSULIN LISPRO 100 [IU]/ML
0-15 INJECTION, SOLUTION INTRAVENOUS; SUBCUTANEOUS EVERY 4 HOURS
Status: DISCONTINUED | OUTPATIENT
Start: 2025-02-12 | End: 2025-02-12

## 2025-02-12 RX ORDER — NALOXONE HYDROCHLORIDE 0.4 MG/ML
0.2 INJECTION, SOLUTION INTRAMUSCULAR; INTRAVENOUS; SUBCUTANEOUS EVERY 5 MIN PRN
Status: DISCONTINUED | OUTPATIENT
Start: 2025-02-12 | End: 2025-02-13

## 2025-02-12 RX ORDER — LABETALOL HYDROCHLORIDE 5 MG/ML
10 INJECTION, SOLUTION INTRAVENOUS ONCE
Status: COMPLETED | OUTPATIENT
Start: 2025-02-12 | End: 2025-02-12

## 2025-02-12 RX ORDER — LEVOTHYROXINE SODIUM 125 UG/1
125 TABLET ORAL DAILY
Status: DISCONTINUED | OUTPATIENT
Start: 2025-02-13 | End: 2025-02-17 | Stop reason: HOSPADM

## 2025-02-12 RX ORDER — PANTOPRAZOLE SODIUM 40 MG/1
40 TABLET, DELAYED RELEASE ORAL
Status: DISCONTINUED | OUTPATIENT
Start: 2025-02-13 | End: 2025-02-13

## 2025-02-12 RX ORDER — AMOXICILLIN 250 MG
2 CAPSULE ORAL 2 TIMES DAILY
Status: DISCONTINUED | OUTPATIENT
Start: 2025-02-12 | End: 2025-02-17 | Stop reason: HOSPADM

## 2025-02-12 RX ORDER — ROSUVASTATIN CALCIUM 20 MG/1
20 TABLET, COATED ORAL DAILY
Status: DISCONTINUED | OUTPATIENT
Start: 2025-02-13 | End: 2025-02-13

## 2025-02-12 RX ORDER — CEFAZOLIN SODIUM 2 G/100ML
2 INJECTION, SOLUTION INTRAVENOUS EVERY 8 HOURS
Status: COMPLETED | OUTPATIENT
Start: 2025-02-12 | End: 2025-02-14

## 2025-02-12 RX ORDER — POTASSIUM CHLORIDE 20 MEQ/1
20 TABLET, EXTENDED RELEASE ORAL EVERY 6 HOURS PRN
Status: DISCONTINUED | OUTPATIENT
Start: 2025-02-12 | End: 2025-02-13

## 2025-02-12 RX ORDER — POTASSIUM CHLORIDE 1.5 G/1.58G
20 POWDER, FOR SOLUTION ORAL EVERY 6 HOURS PRN
Status: DISCONTINUED | OUTPATIENT
Start: 2025-02-12 | End: 2025-02-13

## 2025-02-12 RX ORDER — POTASSIUM CHLORIDE 1.5 G/1.58G
40 POWDER, FOR SOLUTION ORAL EVERY 6 HOURS PRN
Status: DISCONTINUED | OUTPATIENT
Start: 2025-02-12 | End: 2025-02-13

## 2025-02-12 RX ORDER — SODIUM CHLORIDE 0.9 G/100ML
INJECTION, SOLUTION IRRIGATION AS NEEDED
Status: DISCONTINUED | OUTPATIENT
Start: 2025-02-12 | End: 2025-02-12 | Stop reason: HOSPADM

## 2025-02-12 RX ORDER — HYDROMORPHONE HYDROCHLORIDE 0.2 MG/ML
0.2 INJECTION INTRAMUSCULAR; INTRAVENOUS; SUBCUTANEOUS
Status: DISCONTINUED | OUTPATIENT
Start: 2025-02-12 | End: 2025-02-13

## 2025-02-12 RX ORDER — HEPARIN SODIUM 1000 [USP'U]/ML
INJECTION, SOLUTION INTRAVENOUS; SUBCUTANEOUS AS NEEDED
Status: DISCONTINUED | OUTPATIENT
Start: 2025-02-12 | End: 2025-02-12

## 2025-02-12 RX ORDER — MAGNESIUM SULFATE HEPTAHYDRATE 40 MG/ML
2 INJECTION, SOLUTION INTRAVENOUS EVERY 6 HOURS PRN
Status: DISCONTINUED | OUTPATIENT
Start: 2025-02-12 | End: 2025-02-13

## 2025-02-12 RX ORDER — CEFAZOLIN 1 G/1
INJECTION, POWDER, FOR SOLUTION INTRAVENOUS AS NEEDED
Status: DISCONTINUED | OUTPATIENT
Start: 2025-02-12 | End: 2025-02-12

## 2025-02-12 RX ORDER — ONDANSETRON 4 MG/1
4 TABLET, FILM COATED ORAL EVERY 8 HOURS PRN
Status: DISCONTINUED | OUTPATIENT
Start: 2025-02-12 | End: 2025-02-13

## 2025-02-12 RX ORDER — PROPOFOL 10 MG/ML
INJECTION, EMULSION INTRAVENOUS AS NEEDED
Status: DISCONTINUED | OUTPATIENT
Start: 2025-02-12 | End: 2025-02-12

## 2025-02-12 RX ORDER — DEXTROSE 50 % IN WATER (D50W) INTRAVENOUS SYRINGE
25
Status: DISCONTINUED | OUTPATIENT
Start: 2025-02-12 | End: 2025-02-13

## 2025-02-12 RX ORDER — SODIUM CHLORIDE, SODIUM LACTATE, POTASSIUM CHLORIDE, CALCIUM CHLORIDE 600; 310; 30; 20 MG/100ML; MG/100ML; MG/100ML; MG/100ML
5 INJECTION, SOLUTION INTRAVENOUS CONTINUOUS
Status: DISCONTINUED | OUTPATIENT
Start: 2025-02-12 | End: 2025-02-13

## 2025-02-12 RX ORDER — LIDOCAINE HYDROCHLORIDE 20 MG/ML
INJECTION, SOLUTION INFILTRATION; PERINEURAL AS NEEDED
Status: DISCONTINUED | OUTPATIENT
Start: 2025-02-12 | End: 2025-02-12

## 2025-02-12 RX ORDER — ROSUVASTATIN CALCIUM 20 MG/1
20 TABLET, COATED ORAL DAILY
Status: DISCONTINUED | OUTPATIENT
Start: 2025-02-12 | End: 2025-02-12

## 2025-02-12 RX ORDER — POTASSIUM CHLORIDE 20 MEQ/1
40 TABLET, EXTENDED RELEASE ORAL EVERY 6 HOURS PRN
Status: DISCONTINUED | OUTPATIENT
Start: 2025-02-12 | End: 2025-02-13

## 2025-02-12 RX ORDER — ALBUMIN HUMAN 50 G/1000ML
SOLUTION INTRAVENOUS AS NEEDED
Status: DISCONTINUED | OUTPATIENT
Start: 2025-02-12 | End: 2025-02-12

## 2025-02-12 RX ORDER — POTASSIUM CHLORIDE 29.8 MG/ML
40 INJECTION INTRAVENOUS EVERY 6 HOURS PRN
Status: DISCONTINUED | OUTPATIENT
Start: 2025-02-12 | End: 2025-02-13

## 2025-02-12 RX ORDER — POTASSIUM CHLORIDE 29.8 MG/ML
INJECTION INTRAVENOUS AS NEEDED
Status: DISCONTINUED | OUTPATIENT
Start: 2025-02-12 | End: 2025-02-12

## 2025-02-12 RX ORDER — NITROGLYCERIN 20 MG/100ML
INJECTION INTRAVENOUS
Status: COMPLETED
Start: 2025-02-12 | End: 2025-02-12

## 2025-02-12 RX ORDER — PROTAMINE SULFATE 10 MG/ML
INJECTION, SOLUTION INTRAVENOUS AS NEEDED
Status: DISCONTINUED | OUTPATIENT
Start: 2025-02-12 | End: 2025-02-12

## 2025-02-12 RX ORDER — OXYCODONE HYDROCHLORIDE 10 MG/1
10 TABLET ORAL EVERY 4 HOURS PRN
Status: DISCONTINUED | OUTPATIENT
Start: 2025-02-12 | End: 2025-02-17

## 2025-02-12 RX ORDER — ACETAMINOPHEN 10 MG/ML
1000 INJECTION, SOLUTION INTRAVENOUS EVERY 6 HOURS SCHEDULED
Status: DISCONTINUED | OUTPATIENT
Start: 2025-02-13 | End: 2025-02-13

## 2025-02-12 RX ORDER — METHOCARBAMOL 100 MG/ML
1000 INJECTION, SOLUTION INTRAMUSCULAR; INTRAVENOUS ONCE
Status: COMPLETED | OUTPATIENT
Start: 2025-02-12 | End: 2025-02-12

## 2025-02-12 RX ORDER — MAGNESIUM SULFATE HEPTAHYDRATE 40 MG/ML
4 INJECTION, SOLUTION INTRAVENOUS EVERY 6 HOURS PRN
Status: DISCONTINUED | OUTPATIENT
Start: 2025-02-12 | End: 2025-02-13

## 2025-02-12 RX ORDER — NAPROXEN SODIUM 220 MG/1
81 TABLET, FILM COATED ORAL DAILY
Status: DISCONTINUED | OUTPATIENT
Start: 2025-02-12 | End: 2025-02-12

## 2025-02-12 RX ADMIN — ROCURONIUM 20 MG: 50 INJECTION, SOLUTION INTRAVENOUS at 12:17

## 2025-02-12 RX ADMIN — SODIUM CHLORIDE: 9 INJECTION, SOLUTION INTRAVENOUS at 11:20

## 2025-02-12 RX ADMIN — POTASSIUM CHLORIDE 40 MEQ: 29.8 INJECTION, SOLUTION INTRAVENOUS at 16:22

## 2025-02-12 RX ADMIN — ESMOLOL HYDROCHLORIDE 5 MG: 10 INJECTION, SOLUTION INTRAVENOUS at 11:25

## 2025-02-12 RX ADMIN — ONDANSETRON 4 MG: 2 INJECTION INTRAMUSCULAR; INTRAVENOUS at 17:16

## 2025-02-12 RX ADMIN — PROPOFOL 20 MG: 10 INJECTION, EMULSION INTRAVENOUS at 11:33

## 2025-02-12 RX ADMIN — HYDROMORPHONE HYDROCHLORIDE 0.2 MG: 0.2 INJECTION, SOLUTION INTRAMUSCULAR; INTRAVENOUS; SUBCUTANEOUS at 14:56

## 2025-02-12 RX ADMIN — SUGAMMADEX 145 MG: 100 INJECTION, SOLUTION INTRAVENOUS at 15:10

## 2025-02-12 RX ADMIN — LIDOCAINE HYDROCHLORIDE 100 MG: 20 INJECTION, SOLUTION INFILTRATION; PERINEURAL at 10:37

## 2025-02-12 RX ADMIN — POTASSIUM CHLORIDE 20 MEQ: 14.9 INJECTION, SOLUTION INTRAVENOUS at 12:35

## 2025-02-12 RX ADMIN — MIDAZOLAM HYDROCHLORIDE 1 MG: 1 INJECTION, SOLUTION INTRAMUSCULAR; INTRAVENOUS at 10:02

## 2025-02-12 RX ADMIN — SODIUM CHLORIDE: 9 INJECTION, SOLUTION INTRAVENOUS at 11:00

## 2025-02-12 RX ADMIN — POTASSIUM CHLORIDE 40 MEQ: 29.8 INJECTION, SOLUTION INTRAVENOUS at 13:49

## 2025-02-12 RX ADMIN — Medication 4 L/MIN: at 16:49

## 2025-02-12 RX ADMIN — LABETALOL HYDROCHLORIDE 10 MG: 5 INJECTION INTRAVENOUS at 21:10

## 2025-02-12 RX ADMIN — FENTANYL CITRATE 100 MCG: 50 INJECTION, SOLUTION INTRAMUSCULAR; INTRAVENOUS at 11:25

## 2025-02-12 RX ADMIN — Medication 0.01 MCG/KG/MIN: at 11:43

## 2025-02-12 RX ADMIN — ROCURONIUM 100 MG: 50 INJECTION, SOLUTION INTRAVENOUS at 10:46

## 2025-02-12 RX ADMIN — ALBUMIN HUMAN 250 ML: 0.05 INJECTION, SOLUTION INTRAVENOUS at 13:36

## 2025-02-12 RX ADMIN — HYDROMORPHONE HYDROCHLORIDE 0.2 MG: 0.2 INJECTION, SOLUTION INTRAMUSCULAR; INTRAVENOUS; SUBCUTANEOUS at 15:32

## 2025-02-12 RX ADMIN — PROTAMINE SULFATE 150 MG: 10 INJECTION, SOLUTION INTRAVENOUS at 13:20

## 2025-02-12 RX ADMIN — SODIUM CHLORIDE, POTASSIUM CHLORIDE, SODIUM LACTATE AND CALCIUM CHLORIDE 5 ML/HR: 600; 310; 30; 20 INJECTION, SOLUTION INTRAVENOUS at 14:30

## 2025-02-12 RX ADMIN — HYDROMORPHONE HYDROCHLORIDE 0.2 MG: 0.2 INJECTION, SOLUTION INTRAMUSCULAR; INTRAVENOUS; SUBCUTANEOUS at 15:53

## 2025-02-12 RX ADMIN — CEFAZOLIN SODIUM 2 G: 2 INJECTION, SOLUTION INTRAVENOUS at 21:10

## 2025-02-12 RX ADMIN — CEFAZOLIN 2 G: 1 INJECTION, POWDER, FOR SOLUTION INTRAMUSCULAR; INTRAVENOUS at 11:25

## 2025-02-12 RX ADMIN — NITROGLYCERIN 100 MCG/MIN: 20 INJECTION INTRAVENOUS at 15:08

## 2025-02-12 RX ADMIN — EPINEPHRINE IN SODIUM CHLORIDE 0.01 MCG/KG/MIN: 16 INJECTION INTRAVENOUS at 11:43

## 2025-02-12 RX ADMIN — HYDROMORPHONE HYDROCHLORIDE 0.2 MG: 0.2 INJECTION, SOLUTION INTRAMUSCULAR; INTRAVENOUS; SUBCUTANEOUS at 16:57

## 2025-02-12 RX ADMIN — SODIUM CHLORIDE, SODIUM GLUCONATE, SODIUM ACETATE, POTASSIUM CHLORIDE AND MAGNESIUM CHLORIDE: 526; 502; 368; 37; 30 INJECTION, SOLUTION INTRAVENOUS at 10:03

## 2025-02-12 RX ADMIN — HEPARIN SODIUM 15000 UNITS: 1000 INJECTION, SOLUTION INTRAVENOUS; SUBCUTANEOUS at 12:25

## 2025-02-12 RX ADMIN — PROPOFOL 25 MCG/KG/MIN: 10 INJECTION, EMULSION INTRAVENOUS at 13:42

## 2025-02-12 RX ADMIN — ACETAMINOPHEN 1000 MG: 1000 INJECTION, SOLUTION INTRAVENOUS at 23:30

## 2025-02-12 RX ADMIN — PROPOFOL 100 MG: 10 INJECTION, EMULSION INTRAVENOUS at 10:38

## 2025-02-12 RX ADMIN — ACETAMINOPHEN 650 MG: 325 TABLET ORAL at 14:56

## 2025-02-12 RX ADMIN — NITROGLYCERIN 50 MCG/MIN: 20 INJECTION INTRAVENOUS at 23:31

## 2025-02-12 RX ADMIN — NITROGLYCERIN 30 MCG/MIN: 20 INJECTION INTRAVENOUS at 14:25

## 2025-02-12 RX ADMIN — NOREPINEPHRINE BITARTRATE 16 MCG: 1 INJECTION, SOLUTION, CONCENTRATE INTRAVENOUS at 11:00

## 2025-02-12 RX ADMIN — NOREPINEPHRINE BITARTRATE 16 MCG: 1 INJECTION, SOLUTION, CONCENTRATE INTRAVENOUS at 13:27

## 2025-02-12 RX ADMIN — INSULIN LISPRO 5 UNITS: 100 INJECTION, SOLUTION INTRAVENOUS; SUBCUTANEOUS at 15:03

## 2025-02-12 RX ADMIN — OXYCODONE HYDROCHLORIDE 10 MG: 10 TABLET ORAL at 15:19

## 2025-02-12 RX ADMIN — INSULIN LISPRO 10 UNITS: 100 INJECTION, SOLUTION INTRAVENOUS; SUBCUTANEOUS at 21:09

## 2025-02-12 RX ADMIN — SODIUM CHLORIDE 2 UNITS/HR: 9 INJECTION, SOLUTION INTRAVENOUS at 12:35

## 2025-02-12 RX ADMIN — LABETALOL HYDROCHLORIDE 10 MG: 5 INJECTION INTRAVENOUS at 23:30

## 2025-02-12 RX ADMIN — ALBUMIN HUMAN 250 ML: 0.05 INJECTION, SOLUTION INTRAVENOUS at 13:45

## 2025-02-12 RX ADMIN — METOCLOPRAMIDE HYDROCHLORIDE 10 MG: 5 INJECTION INTRAMUSCULAR; INTRAVENOUS at 19:37

## 2025-02-12 RX ADMIN — PROPOFOL 100 MG: 10 INJECTION, EMULSION INTRAVENOUS at 10:41

## 2025-02-12 RX ADMIN — HYDROMORPHONE HYDROCHLORIDE 0.2 MG: 0.2 INJECTION, SOLUTION INTRAMUSCULAR; INTRAVENOUS; SUBCUTANEOUS at 17:59

## 2025-02-12 RX ADMIN — FENTANYL CITRATE 500 MCG: 50 INJECTION, SOLUTION INTRAMUSCULAR; INTRAVENOUS at 10:37

## 2025-02-12 RX ADMIN — ASPIRIN 81 MG CHEWABLE TABLET 81 MG: 81 TABLET CHEWABLE at 14:56

## 2025-02-12 RX ADMIN — NOREPINEPHRINE BITARTRATE 8 MCG: 1 INJECTION, SOLUTION, CONCENTRATE INTRAVENOUS at 10:37

## 2025-02-12 RX ADMIN — CALCIUM GLUCONATE 1 G: 20 INJECTION, SOLUTION INTRAVENOUS at 16:22

## 2025-02-12 RX ADMIN — METHOCARBAMOL 1000 MG: 1000 INJECTION, SOLUTION INTRAMUSCULAR; INTRAVENOUS at 21:10

## 2025-02-12 RX ADMIN — MIDAZOLAM HYDROCHLORIDE 1 MG: 1 INJECTION, SOLUTION INTRAMUSCULAR; INTRAVENOUS at 10:21

## 2025-02-12 SDOH — HEALTH STABILITY: MENTAL HEALTH: CURRENT SMOKER: 0

## 2025-02-12 ASSESSMENT — PAIN DESCRIPTION - ORIENTATION: ORIENTATION: LEFT

## 2025-02-12 ASSESSMENT — PAIN SCALES - GENERAL
PAINLEVEL_OUTOF10: 8
PAINLEVEL_OUTOF10: 10 - WORST POSSIBLE PAIN
PAINLEVEL_OUTOF10: 4
PAINLEVEL_OUTOF10: 10 - WORST POSSIBLE PAIN
PAINLEVEL_OUTOF10: 5 - MODERATE PAIN
PAINLEVEL_OUTOF10: 5 - MODERATE PAIN
PAINLEVEL_OUTOF10: 0 - NO PAIN
PAIN_LEVEL: 0

## 2025-02-12 ASSESSMENT — COLUMBIA-SUICIDE SEVERITY RATING SCALE - C-SSRS
1. IN THE PAST MONTH, HAVE YOU WISHED YOU WERE DEAD OR WISHED YOU COULD GO TO SLEEP AND NOT WAKE UP?: NO
6. HAVE YOU EVER DONE ANYTHING, STARTED TO DO ANYTHING, OR PREPARED TO DO ANYTHING TO END YOUR LIFE?: NO
2. HAVE YOU ACTUALLY HAD ANY THOUGHTS OF KILLING YOURSELF?: NO

## 2025-02-12 ASSESSMENT — PAIN - FUNCTIONAL ASSESSMENT
PAIN_FUNCTIONAL_ASSESSMENT: 0-10
PAIN_FUNCTIONAL_ASSESSMENT: CPOT (CRITICAL CARE PAIN OBSERVATION TOOL)

## 2025-02-12 ASSESSMENT — PAIN SCALES - PAIN ASSESSMENT IN ADVANCED DEMENTIA (PAINAD)
BREATHING: OCCASIONAL LABORED BREATHING, SHORT PERIOD OF HYPERVENTILATION
TOTALSCORE: MEDICATION (SEE MAR)

## 2025-02-12 ASSESSMENT — PAIN DESCRIPTION - LOCATION: LOCATION: CHEST

## 2025-02-12 NOTE — ANESTHESIA PROCEDURE NOTES
Arterial Line:    Date/Time: 2/12/2025 10:20 AM    Staffing  Performed: fellow   Authorized by: Nick Diaz MD    Performed by: Yaya Trejo MD    An arterial line was placed. Procedure performed using ultrasound guidance.in the OR for the following indication(s): .    A 4 English (size) (length), micropuncture (type) catheter was placed into the Left brachial artery, secured by tape,   Seldinger technique used.  Events:  patient tolerated procedure well with no complications.

## 2025-02-12 NOTE — ANESTHESIA PREPROCEDURE EVALUATION
Patient: Tracy Santoyo    Procedure Information       Date/Time: 02/12/25 0935    Procedure: CABG, ROBOT-ASSISTED, USING GRAFT (Left)    Location: [facility] OR 18 / [facility] OR    Surgeons: Edgar Schmitt MD            Relevant Problems   Cardiac   (+) CAD in native artery   (+) Chest pain   (+) Coronary artery disease involving native coronary artery of native heart without angina pectoris   (+) Coronary artery disease of native artery of native heart with stable angina pectoris   (+) Essential hypertension   (+) Hypercholesteremia   (+) Hyperlipidemia   (+) PAC (premature atrial contraction)   (+) PVC's (premature ventricular contractions)   (+) Premature ventricular beats      Pulmonary   (+) JOVEL (dyspnea on exertion)      /Renal   (+) Chronic UTI      Endocrine   (+) Hypothyroidism      ID   (+) Chronic UTI       Clinical information reviewed:     Meds               NPO Detail:  No data recorded     Physical Exam    Airway  Mallampati: III  TM distance: >3 FB  Neck ROM: full     Cardiovascular   Rhythm: regular  Rate: normal  (-) peripheral edema     Dental        Pulmonary - normal exam     Abdominal   Abdomen: soft             Anesthesia Plan    History of general anesthesia?: yes  History of complications of general anesthesia?: no    ASA 4     general   (Patient is agreeable to nerve blocks)  The patient is not a current smoker.  Patient was previously instructed to abstain from smoking on day of procedure.  Patient did not smoke on day of procedure.  Education provided regarding risk of obstructive sleep apnea.  intravenous induction   Postoperative administration of opioids is intended.  Anesthetic plan and risks discussed with patient.  Use of blood products discussed with patient who.    Plan discussed with fellow, resident and attending.

## 2025-02-12 NOTE — ANESTHESIA PROCEDURE NOTES
Peripheral IV  Date/Time: 2/12/2025 10:30 AM      Placement  Needle size: 16 G  Laterality: right  Location: antecubital  Local anesthetic: injectable  Site prep: alcohol  Technique: anatomical landmarks  Attempts: 1

## 2025-02-12 NOTE — BRIEF OP NOTE
Date: 2025  OR Location: [facility] OR    Name: Tracy Santoyo, : 1950, Age: 74 y.o., MRN: [id number], Sex: female    Diagnosis  Pre-op Diagnosis      * Coronary artery disease of native artery of native heart with stable angina pectoris [I25.118] Post-op Diagnosis     * Coronary artery disease of native artery of native heart with stable angina pectoris [I25.118]     Procedures  CABG X1, ROBOT-ASSISTED, LIMA TO LAD MID CAB  [id number] - MD CABG W/ARTERIAL GRAFT SINGLE ARTERIAL GRAFT  Robotic-assisted left internal mammary harvest/ in-situ   2. Left anterolateral mini-thoracotomy   3.  Off-pump LIMA to LAD  4.  Closure of subcutaneous/ subcuticular tissue      Chest Tubes/Drains: 1 left pleural chest tube       Temporary Pacing Wires: NO    -Settings:   -Underlying Rhythm:    Permanent pacer/ICD: No   -Preoperative settings:    -Intra-op/ Postoperative settings:    Sternotomy performed by: FLACA     Conduit Harvested by: Caridad MIRANDA- Dr. Ian Schmitt Md    Sternal Wires placed by: FLACA    Arm/Leg/Groin Closure/Cutdown performed by: Na    Cardio Pulmonary Bypass Time: NA  Cross-clamp Time: NA  Circulatory Arrest: No Time:     Is patient candidate for Emergency Re-sternotomy? No   -If yes, POD #10 is -      Surgeons      * Edgar Schmitt - Primary    Resident/Fellow/Other Assistant:  Surgeons and Role:  * No surgeons found with a matching role *  Larry MIRANDA   Staff:   Circulator: Esperanza Stahl Person: Radha  Circulator: Jacy Tavarezub Person: Corine  Surgical Assistant: Caroline  Surgical Assistant: Geoff  Surgical Assistant: Baljit  Surgical Assistant: Clayton Stahl Person: Jey Trejo Circulator: Marie    Anesthesia Staff: Anesthesiologist: Nick Diaz MD  Anesthesia Resident: Yaya Trejo MD; Leydi Villalpando MD  Perfusionist: Chioma Hutton    Procedure Summary  Anesthesia: General  ASA: IV  Estimated Blood Loss: 75mL  Intra-op Medications:   Administrations occurring from  0994 to 1555 on 02/12/25:   Medication Name Total Dose   sodium chloride 0.9 % irrigation solution 3,000 mL   papaverine injection 180 mg   albumin human bottle 5% 250 mL   ceFAZolin (Ancef) vial 1 g 2 g   electrolyte-A (Plasmalyte-A) solution Cannot be calculated   EPINEPHrine (Adrenalin) 4 mg in sodium chloride 0.9% 250 mL (16 mcg/mL) infusion (premix) 0.36 mg   esmolol (Brevibloc) injection 5 mg   fentaNYL (Sublimaze) injection 50 mcg/mL 600 mcg   heparin injection 1,000 units/mL 15,000 Units   insulin regular (HumuLIN R,NovoLIN R) 100 Units in sodium chloride 0.9% 100 mL (1 Units/mL) infusion 6.67 Units   insulin regular (HumuLIN R, NovoLIN R) bolus from bag 3 Units   lidocaine (Xylocaine) injection 2 % 100 mg   midazolam PF (Versed) injection 1 mg/mL 2 mg   norepinephrine (Levophed) 8 mg/250 mL D5W (premix) 0.36 mg   norepinephrine (Levophed) 16 mcg/mL syringe for Anesthesia 40 mcg   potassium chloride 20 mEq in 100 mL IV premix 20 mEq   propofol (Diprivan) IV infusion 220 mg   protamine 10 mg/mL 150 mg   rocuronium (ZeMuron) 50 mg/5 mL injection 120 mg   NaCl 0.9 % bolus Cannot be calculated   NaCl 0.9 % infusion 458.33 mL              Anesthesia Record               Intraprocedure I/O Totals          Intake    plasma-lyte-A IV solution 2000.00 mL    Norepinephrine Drip 0.00 mL    The total shown is the total volume documented since Anesthesia Start was filed.    Insulin Drip 0.00 mL    The total shown is the total volume documented since Anesthesia Start was filed.    Epinephrine Drip 0.00 mL    The total shown is the total volume documented since Anesthesia Start was filed.    Propofol Drip 0.00 mL    The total shown is the total volume documented since Anesthesia Start was filed.    Total Intake 2000 mL       Output    Urine 200 mL    NG/OG Tube Output 20 mL    Total Output 220 mL       Net    Net Volume 1780 mL          Specimen: No specimens collected               Findings: CAD    Complications:  None;  patient tolerated the procedure well.     Disposition: ICU - intubated and hemodynamically stable.  Condition: stable  Specimens Collected: No specimens collected  Attending Attestation: I was present for the entire procedure.    Edgar Schmitt  Phone Number: [phone]

## 2025-02-12 NOTE — ANESTHESIA POSTPROCEDURE EVALUATION
Patient: Tracy Santoyo    Procedure Summary       Date: 02/12/25 Room / Location: [facility] OR 18 / [facility] OR    Anesthesia Start: 1005 Anesthesia Stop: 1439    Procedure: CABG X1, ROBOT-ASSISTED, LIMA TO LAD MID CAB (Left: Chest) Diagnosis:       Coronary artery disease of native artery of native heart with stable angina pectoris      (Coronary artery disease of native artery of native heart with stable angina pectoris [I25.118])    Surgeons: Edgar Schmitt MD Responsible Provider: Nick Diaz MD    Anesthesia Type: general ASA Status: 4            Anesthesia Type: general    Vitals Value Taken Time   /62 02/12/25 1439   Temp 35.6 °C (96.08 °F) 02/12/25 1438   Pulse 67 02/12/25 1438   Resp 20 02/12/25 1438   SpO2 100 % 02/12/25 1438   Vitals shown include unfiled device data.    Anesthesia Post Evaluation    Patient location during evaluation: ICU  Patient participation: complete - patient cannot participate  Level of consciousness: sedated  Pain score: 0  Pain management: adequate  Airway patency: patent  Two or more strategies used to mitigate risk of obstructive sleep apnea  Cardiovascular status: acceptable, hemodynamically stable and hypertensive  Respiratory status: acceptable and ETT  Hydration status: acceptable  Postoperative Nausea and Vomiting: none      No notable events documented.

## 2025-02-12 NOTE — OP NOTE
CABG X1, ROBOT-ASSISTED, LIMA TO LAD MID CAB (L) Operative Note     Date: 2025  OR Location: [facility] OR    Name: Tracy Santoyo, : 1950, Age: 74 y.o., MRN: [id number], Sex: female    Diagnosis  Pre-op Diagnosis      * Coronary artery disease of native artery of native heart with stable angina pectoris [I25.118] Post-op Diagnosis     * Coronary artery disease of native artery of native heart with stable angina pectoris [I25.118]     Procedures  CABG X1, ROBOT-ASSISTED, LIMA TO LAD MID CAB  16386 - DC CABG W/ARTERIAL GRAFT SINGLE ARTERIAL GRAFT    1.  Robotic assisted mini invasive off-pump CABG with pedicle LIMA to LAD  Surgeons      * Edgar Schmitt - Primary    Resident/Fellow/Other Assistant:  Surgeons and Role:  * No surgeons found with a matching role *  There was no qualified resident for this opeation    Staff:   Circulator: Esperanza  Scrub Person: Radha  Circulator: Jacy  Scrub Person: Corine  Surgical Assistant: Caroline  Surgical Assistant: Geoff  Surgical Assistant: Baljit  Surgical Assistant: Clayton Stahl Person: Jey Trejo Circulator: Marie    Anesthesia Staff: Anesthesiologist: Nick Diaz MD  Anesthesia Resident: Yaya Trejo MD; Leydi Villalpando MD  Perfusionist: Chioma Hutton    Procedure Summary  Anesthesia: General  ASA: IV  Estimated Blood Loss: 100 mL  Intra-op Medications:   Administrations occurring from 0935 to 1555 on 25:   Medication Name Total Dose   sodium chloride 0.9 % irrigation solution 3,000 mL   papaverine injection 180 mg   acetaminophen (Tylenol) tablet 650 mg 650 mg   aspirin chewable tablet 81 mg 81 mg   HYDROmorphone PF (Dilaudid) injection 0.2 mg 0.2 mg   lactated Ringer's infusion 7.08 mL   nitroglycerin (Tridil) 50 mg in dextrose 5% 250 mL (0.2 mg/mL) infusion (premix) 11,600 mcg   propofol (Diprivan) infusion 268.5 mg   albumin human bottle 5% 500 mL   ceFAZolin (Ancef) vial 1 g 2 g   electrolyte-A (Plasmalyte-A) solution  Cannot be calculated   EPINEPHrine (Adrenalin) 4 mg in sodium chloride 0.9% 250 mL (16 mcg/mL) infusion (premix) 0.25 mg   esmolol (Brevibloc) injection 5 mg   fentaNYL (Sublimaze) injection 50 mcg/mL 600 mcg   heparin injection 1,000 units/mL 15,000 Units   insulin regular (HumuLIN R,NovoLIN R) 100 Units in sodium chloride 0.9% 100 mL (1 Units/mL) infusion 4.14 Units   insulin regular (HumuLIN R, NovoLIN R) bolus from bag 3 Units   lidocaine (Xylocaine) injection 2 % 100 mg   midazolam PF (Versed) injection 1 mg/mL 2 mg   norepinephrine (Levophed) 8 mg/250 mL D5W (premix) 0.2 mg   norepinephrine (Levophed) 16 mcg/mL syringe for Anesthesia 40 mcg   potassium chloride 20 mEq in 100 mL IV premix 20 mEq   potassium chloride IV 40 mEq in 100 mL sterile water for injection - premix 40 mEq   propofol (Diprivan) IV infusion 322.21 mg   protamine 10 mg/mL 150 mg   rocuronium (ZeMuron) 50 mg/5 mL injection 120 mg   NaCl 0.9 % bolus Cannot be calculated   NaCl 0.9 % infusion 331.81 mL              Anesthesia Record               Intraprocedure I/O Totals          Intake    plasma-lyte-A IV solution 2000.00 mL    Norepinephrine Drip 0.00 mL    The total shown is the total volume documented since Anesthesia Start was filed.    Insulin Drip 0.00 mL    The total shown is the total volume documented since Anesthesia Start was filed.    Epinephrine Drip 0.00 mL    The total shown is the total volume documented since Anesthesia Start was filed.    Propofol Drip 0.00 mL    The total shown is the total volume documented since Anesthesia Start was filed.    NaCl 0.9 % infusion 330.00 mL    Total Intake 2330 mL       Output    Urine 250 mL    Est. Blood Loss 150 mL    NG/OG Tube Output 20 mL    Total Output 420 mL       Net    Net Volume 1910 mL          Specimen: No specimens collected              Drains and/or Catheters:   Chest Tube Left Pleural (Active)   Output (mL) 10 mL 02/12/25 1440       NG/OG/Feeding Tube ARACELY berkowitz  Right mouth (Active)       Urethral Catheter Temperature probe 14 Fr. (Active)   Site Assessment Clean;Skin intact 02/12/25 1047   Output (mL) 60 mL 02/12/25 1440       Tourniquet Times:         Implants:     Findings: 1.There were no pericardial adhesions or effusions.   2. The ascending aorta was soft without evidence of atherosclerosis.   3. The heart was normal sinus and demonstrated normal left ventricular function.    4.The patient had severe diffuse coronary artery disease, however we were able to find locations on the LAD that was suitable for grafting.  5.  The conduits were suitable for grafting.  6.  The flow of the graft after the protamine were acceptable.  7.  Previous saphenous vein bilateral stripping.  The left radial was not suitable for CABG.      Indications: Tracy Santoyo is an 74 y.o. female who is having surgery for Coronary artery disease of native artery of native heart with stable angina pectoris [I25.118]. Tracy is a 74-year-old female that was sent to our outpatient clinic after being diagnosed with symptomatic double vessel disease from a cath,. SHE  on medical treatment and did well for almost a year until last December when she started having several episodes of chest pain that were relieved with nitroglycerin.  I think she have a good indication for coronary artery bypass surgery, in terms of symptom relief and prognosis.   With the family the risk and benefit of the procedure and they agreed to proceed.  Because the patient had previous vein stripping for varicose vein and the radial is not feasible for grafting I think she will benefit with a hybrid approach by performing a LIMA to LAD and if needed stenting of the circumflex.    The patient was seen in the preoperative area. The risks, benefits, complications, treatment options, non-operative alternatives, expected recovery and outcomes were discussed with the patient. The possibilities of reaction to medication, pulmonary  aspiration, injury to surrounding structures, bleeding, recurrent infection, the need for additional procedures, failure to diagnose a condition, and creating a complication requiring transfusion or operation were discussed with the patient. The patient concurred with the proposed plan, giving informed consent.  The site of surgery was properly noted/marked if necessary per policy. The patient has been actively warmed in preoperative area. Preoperative antibiotics have been ordered and given within 1 hours of incision. Venous thrombosis prophylaxis are not indicated.    Procedure Details: The patient was taken to the operating room by anesthesia, placed supine on the operating table, intubated  with a double lumen tube and placed under general anesthesia.  A central line and juan jose were placed. AJITH was performed which confirmed the preoperative findings of reduced EF without significant valvular disease.  The patient was prepped and draped in the usual sterile fashion.  A time out was performed. The patient was placed on single right lung ventilation. Three 8mm robotic ports were placed at the 2nd, 5th and 7th intercostal spaces under direct visualization.  Pneumothorax was induced to 8mmHg and the robot was docked.  The mammary was harvested from the second intercostal space distal to the bifurcation.  Heparin was administered and the mammary divided distally.  It was tacked in placed along the medial aspect of the pericardium.  The pericardium was opened and the LAD identified. The robot was then undocked.  The trocar site at the 5th intercostal space was extended several centimeters and the thoratrack retractor was placed. The stabilizer device was inserted from a sub-xyphoid position and placed around the LAD. The mammary was prepared. It was noted to have excellent flow.  The LAD was opened and a the right  size shunt was placed.  The LIMA was anastomosed using a running 7-0 prolene suture.  The LIMA to LAD  flows were assess with the flow probe.  There was good flow with a low PI.  The stabilizer was removed and the flows were confirmed and noted to still be appropriate.  Protamine was administered.  Hemostasis was meticulously secured.  A chest tube was placed through the track where the stabilizer device had been inserted.  The ribs were reapproximated with #5 vicryl sutures.  The fascia was closed with running 0-vicryl suture.  The subcutaneous tissue was closed in layers with the skin closed with a running 4-0 vicryl suture.  A dry sterile dressing was applied.  All instrument, needle and sponge counts were correct at the end of the case.  The patient was left intubated and transferred to the ICU in stable condition.   Medistem flows:   LIIMA to LAD:  20 ml/min with a PI of 1.9    Complications:  None; patient tolerated the procedure well.    Disposition: ICU - intubated and hemodynamically stable.  Condition: stable         Task Performed by RNFA or Surgical Assistant:  Geoff Ojeda was the first assistant.  Docking and on docking of the robot closing the thoracotomy.            Additional Details:     Attending Attestation: I was present and scrubbed for the entire procedure.    Edgar Schmitt  Phone Number: [phone]

## 2025-02-12 NOTE — ANESTHESIA PROCEDURE NOTES
Central Venous Line:    Date/Time: 2/12/2025 11:05 AM    A central venous line was placed in the OR for the following indication(s): central venous access and CVP monitoring.  Staffing  Performed: fellow   Authorized by: Nick Diaz MD    Performed by: Yaya Trejo MD    Sterility preparation included the following: provider hand hygiene performed prior to central venous catheter insertion, all 5 sterile barriers used (gloves, gown, cap, mask, large sterile drape) during central venous catheter insertion, antiseptic used during central venous catheter insertion and skin prep agent completely dried prior to procedure.  Medical reason for not performing maximal sterile barrier technique: no  The patient was placed in Trendelenburg position.    Right internal jugular vein was prepped.    The site was prepped with Chlorhexidine.  Catheter size: 8 Fr.   Length: 11.5  Catheter type: introducer   Number of Lumens: double lumen    This catheter was not an oximetric catheter.    During the procedure, the following specific steps were taken: target vein identified, needle advanced into vein and blood aspirated and guidewire advanced into vein.  Seldinger technique used.  Procedure performed using ultrasound guidance.  Sterile gel and probe cover used in ultrasound-guided central venous catheter insertion.    Intravenous verification was obtained by ultrasound, venous blood return and manometry.      Post insertion care included: all ports aspirated, all ports flushed easily, guidewire removed intact, Biopatch applied, line sutured in place and dressing applied.    During the procedure the patient experienced: patient tolerated procedure well with no complications.           images stored in chart

## 2025-02-12 NOTE — H&P
CTICU History & Physical    Subjective   HPI:  Ms. Santoyo is a 74 year old female with PMH CAD, PVC's, HLD, hypothyroidism, chronic UTI, trigeminal neuralgia who had a recent heart cath showing 2 vessel disease. She is now s/p robotic assisted minimally invasive CABG pedicled MIRANDA to LAD with Dr. Ian Schmitt.     Cardiac Testing:     TTE:   1. The left ventricular systolic function is normal, with a Buckley's biplane calculated ejection fraction of 66%.   2. Spectral Doppler shows a Grade I (impaired relaxation pattern) of left ventricular diastolic filling with normal left atrial filling pressure.   3. There is normal right ventricular global systolic function.   4. Aortic valve sclerosis.    LHC:  Coronary Lesion Summary:  Vessel         Stenosis   Vessel Segment  LAD          70% stenosis      mid  LAD          80% stenosis     distal  1st Diagonal 80% stenosis      mid  OM 1         80% stenosis      mid  OM 2         70% stenosis      mid     Procedure/Surgeon:     Dr. Ian Schmitt  Frontliner/Anesthesia: Dr. Trejo  Out of OR Time (document on ventilator card): 1413     OR Course/Issues: N/a     CPB time: N/a  Cross clamp time: N/a  Circ arrest time: N/a  Echo Pre: EF 65%, trace-mild MR  Echo Post: Normal biventricular function, trace MR   Chest Tubes/Drains: Left pleural chest tube   Temporary wires location/setting: None      Fluids  Crystalloid: 2L  Colloid: 500ml albumin  Cellsaver: None   Products: None   EBL: 100ml  UOP: 425ml     Anesthesia  Intubation: ETT  Intravenous Access: 22g R hand, 16g R AC   AICD: N/a  PPM: N/a  Regional anesthesia: None   Benzodiazepine dose/last administration: 2mg midazolam total  Opioid dose/last administration: 600mcg fentanyl total  NMB dose/last administration: 120mg rocuronium total  TOF/ reversal given: 1510 sugammadex   Antibiotic time: 1125  Temperature on admission to ICU: 35.5    Past Medical History:   Diagnosis Date    Agatston coronary artery calcium score  between 200 and 399 02/12/2024    FLL=743 (3/21/23)    Angina pectoris     treated with anti-anginal medical therapy    Chronic sinusitis     Coronary artery disease involving native coronary artery of native heart without angina pectoris 04/02/2024    [facility] (3/15/24) 70% mid LAD, 70% OM, 80% D1    JOVEL (dyspnea on exertion) 01/15/2025    Encounter for fitting and adjustment of other specified devices 08/23/2022    Fitting and adjustment of pessary    Encounter for fitting and adjustment of other specified devices 12/02/2019    Fitting and adjustment of pessary    Essential hypertension 04/02/2024    Hypercholesteremia 04/02/2024    Hypertension     Hypothyroidism     Other chest pain 02/12/2024    PAC (premature atrial contraction) 01/15/2025    Presence of urogenital implants 09/06/2022    Vaginal pessary present    Trigeminal neuralgia      Past Surgical History:   Procedure Laterality Date    APPENDECTOMY  1950    BLADDER SUSPENSION  1994    CARDIAC CATHETERIZATION N/A 03/15/2024    Procedure: Left Heart Cath with Coronary Angiography and LV;  Surgeon: Wood Turner MD;  Location: [facility];  Service: Cardiovascular;  Laterality: N/A;    CARDIAC CATHETERIZATION N/A 01/24/2025    Procedure: Left Heart Cath with Coronary Angiography and LV;  Surgeon: Wood Turner MD;  Location: [facility];  Service: Cardiovascular;  Laterality: N/A;    CHOLECYSTECTOMY  2010    COLONOSCOPY      CRANIOTOMY  2011    craniotomy with trigeminal nerve decompression    HYSTERECTOMY  1990    LIVER SURGERY  2010    resection of liver hemangioma    OTHER SURGICAL HISTORY  01/15/2019    Abdominoplasty    OTHER SURGICAL HISTORY  01/15/2019    Urethropexy    OTHER SURGICAL HISTORY  10/18/2022    Sacrospinous fixation of vaginal vault    SINUS SURGERY  2012     Medications Prior to Admission   Medication Sig Dispense Refill Last Dose/Taking    aspirin 81 mg EC tablet Take 1 tablet (81 mg) by mouth once daily.   2/11/2025     dilTIAZem CD (Cardizem CD) 240 mg 24 hr capsule Take 1 capsule (240 mg) by mouth once daily. 90 capsule 3 2025    isosorbide mononitrate ER (Imdur) 60 mg 24 hr tablet Take 1 tablet (60 mg) by mouth once daily. Do not crush or chew. 90 tablet 3 2025 Morning    levothyroxine (Synthroid, Levoxyl) 125 mcg tablet Take 1 tablet (125 mcg) by mouth once daily.   2025 Morning    nitrofurantoin (Macrodantin) 50 mg capsule 1 capsule (50 mg) by Enteral route every other day.   2025    nitroglycerin (Nitrostat) 0.3 mg SL tablet Place 1 tablet (0.3 mg) under the tongue every 5 minutes if needed for chest pain (up to 3). 30 tablet 2 Past Week    rosuvastatin (Crestor) 20 mg tablet Take 1 tablet (20 mg) by mouth once daily. 30 tablet 11 2025    [] chlorhexidine (Hibiclens) 4 % external liquid Apply 1 Application topically once daily for 5 days. Use per CPM/PAT provided instructions 25 mL 0     [] chlorhexidine (Peridex) 0.12 % solution Use 15 mL in the mouth or throat once daily for 2 doses. 30 mL 0     cyanocobalamin (Vitamin B-12) 500 mcg tablet Take 1 tablet (500 mcg) by mouth 1 (one) time per week.       estradiol (Estrace) 0.01 % (0.1 mg/gram) vaginal cream Insert into the vagina 3 times a week.       evening primrose oil 500 mg Take 1 capsule (500 mg) by mouth 2 times a week.       multivitamin tablet Take 1 tablet by mouth 3 times a week.        Carbamazepine and Adhesive tape-silicones  Social History     Tobacco Use    Smoking status: Never    Smokeless tobacco: Never   Vaping Use    Vaping status: Never Used   Substance Use Topics    Alcohol use: Never    Drug use: Never     No family history on file.    Review of Systems:  Unable to assess     Objective   Vitals:  Most Recent:  Vitals:    25 0900   BP: 139/74   Pulse: 70   Resp: 18   Temp: 35.8 °C (96.4 °F)   SpO2: 98%       24hr Min/Max:  Temp  Min: 35.8 °C (96.4 °F)  Max: 35.8 °C (96.4 °F)  Pulse  Min: 70  Max: 70  BP  Min:  "139/74  Max: 139/74  Resp  Min: 18  Max: 18  SpO2  Min: 98 %  Max: 98 %    I/O:  No intake/output data recorded.    LDA:  Urethral Catheter Temperature probe 14 Fr. (Active)   Placement Date/Time: 02/12/25 1046   Placed by: LUCIA WITT  Hand Hygiene Completed: Yes  Catheter Type: Temperature probe  Tube Size (Fr.): 14 Fr.  Catheter Balloon Size: 10 mL  Urine Returned: Yes   Number of days: 0       Physical Exam:   - CONSTITUTION: NAD, intubated, sedated  - NEUROLOGIC:   - CARDIOVASCULAR: HR 60-70's NSR  - RESPIRATORY: intubated, on ventilator, PEEP 8 FiO2 50%  - GI: OGT in place, abdomen soft, nondistended  - : carmona in place draining clear yellow urine  - EXTREMITIES: WWP, no peripheral edema  - SKIN: no rashes or lesions  - PSYCHIATRIC: unable to assess     Lab Review:            No lab exists for component: \"LABALBU\"      Results from last 7 days   Lab Units 02/12/25  1032   POCT PH, ARTERIAL pH 7.43*   POCT PCO2, ARTERIAL mm Hg 37*   POCT PO2, ARTERIAL mm Hg 105*   POCT HCO3 CALCULATED, ARTERIAL mmol/L 24.6   POCT BASE EXCESS, ARTERIAL mmol/L 0.5       Most recent labs and imaging reviewed.    Daily Risk Screen  Intubated: Yes  Central line: Yes  Carmona: Yes    Assessment/Plan     Assessment:  Ms. Santoyo is a 74 year old female with PMH CAD, PVC's, HLD, hypothyroidism, chronic UTI, trigeminal neuralgia who had a recent heart cath showing 2 vessel disease. She is now s/p robotic assisted minimally invasive CABG pedicled MIRANDA to LAD with Dr. Ian Schmitt.      Plan:  NEURO:  PMH of hypothyroidism. Patient is intubated and sedated on propofol infusion. Acute post operative pain.   -->  - Serial neuro and pain assessments   - Continue propofol until NMB reversal, then daily sedation vacation at minimum   - NMB reversal when normothermic and hemodynamically stable.    - Scheduled Tylenol   - PRN oxycodone  - PRN dilaudid for pain   - PT Consult, OOB to chair as tolerated, chair position if not tolerated   - CAM ICU score " qshift  - Sleep/wake cycle hygiene  - Restart home synthroid tomorrow morning if extubated      CV:  Patient has a history of CAD Is now status post robotic assisted minimally invasive off-pump CABG with pedicle LIMA to LAD  Pre/Post EF: 65% Arrived to CTICU on no vasoactive medications-->  - Maintain goal MAP 60-70  - Mixed venous and CI Q4H  - Volume resuscitate as clinically indicated  - If CABG is 2/2 STEMI/unstable angina, will receive Plavix POD2  - Start statin  - Hold home diltiazem, restart when clinically appropriate  - Hold home Imdur     PULM:  No history of pulmonary disease.  Currently intubated on ventilator. 1 left pleural chest tube.-->  - F/u post op CXR  - Once reversed, wean ventilator settings towards CPAP & extubation   - Wean FiO2 maintaining SpO2 >92%.   - IS q1h and OOB to chair when extubated  - Chest tubes to wall suction.    GI: No pertinent GI history.  OG in place.-->  - Continue PPI until extubated  - NPO, will perform bedside swallow eval post extubation   - Colace/senna BID and miralax BID    :  CSA-EMILY Risk Score 3.  No history of renal disease, baseline creatinine 0.8. Creatinine stable post-op. Carmona in place and making adequate UOP. Chronic UTI  -->  - Continue carmona catheter for strict I/Os.  - Goal UOP 0.5ml/kg/hr  - RFP as clinically indicated  - Replete electrolytes per CTICU protocol  - Hold home nitrofurantoin for now      ENDO: No PMH DM -->  - Maintain BG <180, insulin per CTICU protocol     HEME:  Acute blood loss anemia and thrombocytopenia.-->    - Monitor drain output volume and characteristics  - CBC, coags, and fibrinogen post op and as clinically indicated  - Start ASA 6hrs post-op for CABG  - Will receive Plavix POD2  - SQH tomorrow   - SCDs for DVT prophylaxis.  - Last type and screen: 2/12     ID:  Afebrile, no current indications of infection. MRSA negative .-->  - Trend temp q4h  - Periop cefazolin x 48hrs     Skin:  No active skin issues.  - preventative  Mepilex dressings in place on sacrum and heels  - change preventative Mepilex weekly or more frequently as indicated (when moist/soiled)   - every shift skin assessment per nursing and weekly ICU skin rounds  - moisture barrier to be applied with carrol care  - active skin problems addressed with nursing on daily rounds     Proph:  SCDs  PPI     G:  Line  Right IJ MAC w Minimac placed 2/12  Left brachial a-line placed 2/12    F: Family: will update at bedside postoperatively.     A,B,C,D,E,F,G: reviewed    I personally spent 60 minutes of critical care time directly and personally managing the patient exclusive of separately billable procedures.     Dispo: CTICU care for now.    CTICU TEAM PHONE [phone]    Tammy Horowitz  PGY2 General Surgery

## 2025-02-13 ENCOUNTER — APPOINTMENT (OUTPATIENT)
Dept: RADIOLOGY | Facility: HOSPITAL | Age: 75
DRG: 236 | End: 2025-02-13
Payer: MEDICARE

## 2025-02-13 LAB
ALBUMIN SERPL BCP-MCNC: 4 G/DL (ref 3.4–5)
ANION GAP SERPL CALC-SCNC: 13 MMOL/L (ref 10–20)
ATRIAL RATE: 73 BPM
BUN SERPL-MCNC: 15 MG/DL (ref 6–23)
CA-I BLD-SCNC: 1.17 MMOL/L (ref 1.1–1.33)
CALCIUM SERPL-MCNC: 8.8 MG/DL (ref 8.6–10.6)
CHLORIDE SERPL-SCNC: 106 MMOL/L (ref 98–107)
CO2 SERPL-SCNC: 23 MMOL/L (ref 21–32)
CREAT SERPL-MCNC: 0.53 MG/DL (ref 0.5–1.05)
EGFRCR SERPLBLD CKD-EPI 2021: >90 ML/MIN/1.73M*2
ERYTHROCYTE [DISTWIDTH] IN BLOOD BY AUTOMATED COUNT: 14.2 % (ref 11.5–14.5)
GLUCOSE BLD MANUAL STRIP-MCNC: 117 MG/DL (ref 74–99)
GLUCOSE BLD MANUAL STRIP-MCNC: 128 MG/DL (ref 74–99)
GLUCOSE BLD MANUAL STRIP-MCNC: 133 MG/DL (ref 74–99)
GLUCOSE BLD MANUAL STRIP-MCNC: 147 MG/DL (ref 74–99)
GLUCOSE BLD MANUAL STRIP-MCNC: 155 MG/DL (ref 74–99)
GLUCOSE SERPL-MCNC: 106 MG/DL (ref 74–99)
HCT VFR BLD AUTO: 33 % (ref 36–46)
HGB BLD-MCNC: 11.6 G/DL (ref 12–16)
MAGNESIUM SERPL-MCNC: 2.04 MG/DL (ref 1.6–2.4)
MCH RBC QN AUTO: 31.2 PG (ref 26–34)
MCHC RBC AUTO-ENTMCNC: 35.2 G/DL (ref 32–36)
MCV RBC AUTO: 89 FL (ref 80–100)
NRBC BLD-RTO: 0 /100 WBCS (ref 0–0)
P AXIS: 42 DEGREES
P OFFSET: 181 MS
P ONSET: 140 MS
PHOSPHATE SERPL-MCNC: 2.8 MG/DL (ref 2.5–4.9)
PLATELET # BLD AUTO: 208 X10*3/UL (ref 150–450)
POTASSIUM SERPL-SCNC: 4.2 MMOL/L (ref 3.5–5.3)
PR INTERVAL: 170 MS
Q ONSET: 225 MS
QRS COUNT: 12 BEATS
QRS DURATION: 84 MS
QT INTERVAL: 420 MS
QTC CALCULATION(BAZETT): 462 MS
QTC FREDERICIA: 448 MS
R AXIS: 5 DEGREES
RBC # BLD AUTO: 3.72 X10*6/UL (ref 4–5.2)
SODIUM SERPL-SCNC: 138 MMOL/L (ref 136–145)
T AXIS: 40 DEGREES
T OFFSET: 435 MS
VENTRICULAR RATE: 73 BPM
WBC # BLD AUTO: 15.1 X10*3/UL (ref 4.4–11.3)

## 2025-02-13 PROCEDURE — 2500000004 HC RX 250 GENERAL PHARMACY W/ HCPCS (ALT 636 FOR OP/ED)

## 2025-02-13 PROCEDURE — 1200000002 HC GENERAL ROOM WITH TELEMETRY DAILY

## 2025-02-13 PROCEDURE — 2500000002 HC RX 250 W HCPCS SELF ADMINISTERED DRUGS (ALT 637 FOR MEDICARE OP, ALT 636 FOR OP/ED)

## 2025-02-13 PROCEDURE — 99233 SBSQ HOSP IP/OBS HIGH 50: CPT

## 2025-02-13 PROCEDURE — 2500000001 HC RX 250 WO HCPCS SELF ADMINISTERED DRUGS (ALT 637 FOR MEDICARE OP): Performed by: NURSE PRACTITIONER

## 2025-02-13 PROCEDURE — 2500000004 HC RX 250 GENERAL PHARMACY W/ HCPCS (ALT 636 FOR OP/ED): Performed by: NURSE PRACTITIONER

## 2025-02-13 PROCEDURE — 71045 X-RAY EXAM CHEST 1 VIEW: CPT | Performed by: RADIOLOGY

## 2025-02-13 PROCEDURE — 85027 COMPLETE CBC AUTOMATED: CPT

## 2025-02-13 PROCEDURE — 37799 UNLISTED PX VASCULAR SURGERY: CPT

## 2025-02-13 PROCEDURE — 71045 X-RAY EXAM CHEST 1 VIEW: CPT

## 2025-02-13 PROCEDURE — 83735 ASSAY OF MAGNESIUM: CPT

## 2025-02-13 PROCEDURE — 97161 PT EVAL LOW COMPLEX 20 MIN: CPT | Mod: GP

## 2025-02-13 PROCEDURE — 82947 ASSAY GLUCOSE BLOOD QUANT: CPT

## 2025-02-13 PROCEDURE — 80069 RENAL FUNCTION PANEL: CPT

## 2025-02-13 PROCEDURE — 82330 ASSAY OF CALCIUM: CPT

## 2025-02-13 PROCEDURE — 2500000001 HC RX 250 WO HCPCS SELF ADMINISTERED DRUGS (ALT 637 FOR MEDICARE OP)

## 2025-02-13 RX ORDER — ONDANSETRON 4 MG/1
4 TABLET, FILM COATED ORAL EVERY 4 HOURS PRN
Status: DISCONTINUED | OUTPATIENT
Start: 2025-02-13 | End: 2025-02-13

## 2025-02-13 RX ORDER — ONDANSETRON HYDROCHLORIDE 2 MG/ML
4 INJECTION, SOLUTION INTRAVENOUS EVERY 8 HOURS PRN
Status: DISCONTINUED | OUTPATIENT
Start: 2025-02-13 | End: 2025-02-13

## 2025-02-13 RX ORDER — METOPROLOL TARTRATE 25 MG/1
12.5 TABLET, FILM COATED ORAL 2 TIMES DAILY
Status: DISCONTINUED | OUTPATIENT
Start: 2025-02-13 | End: 2025-02-15

## 2025-02-13 RX ORDER — HYDROMORPHONE HYDROCHLORIDE 0.2 MG/ML
0.2 INJECTION INTRAMUSCULAR; INTRAVENOUS; SUBCUTANEOUS EVERY 4 HOURS PRN
Status: DISCONTINUED | OUTPATIENT
Start: 2025-02-13 | End: 2025-02-14

## 2025-02-13 RX ORDER — POLYETHYLENE GLYCOL 3350 17 G/17G
17 POWDER, FOR SOLUTION ORAL 2 TIMES DAILY
Status: DISCONTINUED | OUTPATIENT
Start: 2025-02-13 | End: 2025-02-17 | Stop reason: HOSPADM

## 2025-02-13 RX ORDER — CLOPIDOGREL BISULFATE 75 MG/1
75 TABLET ORAL DAILY
Status: DISCONTINUED | OUTPATIENT
Start: 2025-02-13 | End: 2025-02-17

## 2025-02-13 RX ORDER — METHOCARBAMOL 500 MG/1
500 TABLET, FILM COATED ORAL EVERY 6 HOURS SCHEDULED
Status: DISPENSED | OUTPATIENT
Start: 2025-02-13 | End: 2025-02-16

## 2025-02-13 RX ORDER — ONDANSETRON 4 MG/1
4 TABLET, FILM COATED ORAL EVERY 6 HOURS PRN
Status: DISCONTINUED | OUTPATIENT
Start: 2025-02-13 | End: 2025-02-17 | Stop reason: HOSPADM

## 2025-02-13 RX ORDER — ACETAMINOPHEN 325 MG/1
975 TABLET ORAL EVERY 6 HOURS
Status: DISCONTINUED | OUTPATIENT
Start: 2025-02-13 | End: 2025-02-13

## 2025-02-13 RX ORDER — ACETAMINOPHEN 325 MG/1
650 TABLET ORAL EVERY 6 HOURS
Status: DISCONTINUED | OUTPATIENT
Start: 2025-02-13 | End: 2025-02-17 | Stop reason: HOSPADM

## 2025-02-13 RX ORDER — HYDRALAZINE HYDROCHLORIDE 20 MG/ML
10 INJECTION INTRAMUSCULAR; INTRAVENOUS EVERY 4 HOURS PRN
Status: DISCONTINUED | OUTPATIENT
Start: 2025-02-13 | End: 2025-02-13

## 2025-02-13 RX ORDER — NITROFURANTOIN 25 MG/5ML
50 SUSPENSION ORAL EVERY OTHER DAY
Status: DISCONTINUED | OUTPATIENT
Start: 2025-02-14 | End: 2025-02-17 | Stop reason: HOSPADM

## 2025-02-13 RX ORDER — ONDANSETRON HYDROCHLORIDE 2 MG/ML
4 INJECTION, SOLUTION INTRAVENOUS EVERY 6 HOURS PRN
Status: DISCONTINUED | OUTPATIENT
Start: 2025-02-13 | End: 2025-02-13

## 2025-02-13 RX ORDER — COLCHICINE 0.6 MG/1
0.6 TABLET ORAL 2 TIMES DAILY
Status: DISCONTINUED | OUTPATIENT
Start: 2025-02-13 | End: 2025-02-14

## 2025-02-13 RX ORDER — METHOCARBAMOL 100 MG/ML
1000 INJECTION, SOLUTION INTRAMUSCULAR; INTRAVENOUS ONCE
Status: COMPLETED | OUTPATIENT
Start: 2025-02-13 | End: 2025-02-13

## 2025-02-13 RX ORDER — ROSUVASTATIN CALCIUM 20 MG/1
40 TABLET, COATED ORAL DAILY
Status: DISCONTINUED | OUTPATIENT
Start: 2025-02-14 | End: 2025-02-17 | Stop reason: HOSPADM

## 2025-02-13 RX ORDER — PROCHLORPERAZINE MALEATE 10 MG
10 TABLET ORAL ONCE
Status: COMPLETED | OUTPATIENT
Start: 2025-02-13 | End: 2025-02-13

## 2025-02-13 RX ORDER — NITROFURANTOIN 25; 75 MG/1; MG/1
100 CAPSULE ORAL EVERY OTHER DAY
Status: CANCELLED | OUTPATIENT
Start: 2025-02-14

## 2025-02-13 RX ORDER — INSULIN LISPRO 100 [IU]/ML
0-15 INJECTION, SOLUTION INTRAVENOUS; SUBCUTANEOUS
Status: DISCONTINUED | OUTPATIENT
Start: 2025-02-13 | End: 2025-02-15

## 2025-02-13 RX ORDER — SODIUM CHLORIDE, SODIUM LACTATE, POTASSIUM CHLORIDE, CALCIUM CHLORIDE 600; 310; 30; 20 MG/100ML; MG/100ML; MG/100ML; MG/100ML
50 INJECTION, SOLUTION INTRAVENOUS CONTINUOUS
Status: DISCONTINUED | OUTPATIENT
Start: 2025-02-13 | End: 2025-02-13

## 2025-02-13 RX ORDER — COLCHICINE 0.6 MG/1
0.6 TABLET ORAL DAILY
Status: CANCELLED | OUTPATIENT
Start: 2025-02-13

## 2025-02-13 RX ORDER — HEPARIN SODIUM 5000 [USP'U]/ML
5000 INJECTION, SOLUTION INTRAVENOUS; SUBCUTANEOUS EVERY 8 HOURS
Status: DISCONTINUED | OUTPATIENT
Start: 2025-02-13 | End: 2025-02-17 | Stop reason: HOSPADM

## 2025-02-13 RX ORDER — SCOPOLAMINE 1 MG/3D
1 PATCH, EXTENDED RELEASE TRANSDERMAL
Status: DISCONTINUED | OUTPATIENT
Start: 2025-02-13 | End: 2025-02-13

## 2025-02-13 RX ADMIN — ACETAMINOPHEN 975 MG: 325 TABLET ORAL at 12:02

## 2025-02-13 RX ADMIN — SENNOSIDES AND DOCUSATE SODIUM 2 TABLET: 50; 8.6 TABLET ORAL at 20:46

## 2025-02-13 RX ADMIN — CEFAZOLIN SODIUM 2 G: 2 INJECTION, SOLUTION INTRAVENOUS at 20:55

## 2025-02-13 RX ADMIN — SCOPOLAMINE 1 PATCH: 1.5 PATCH, EXTENDED RELEASE TRANSDERMAL at 08:56

## 2025-02-13 RX ADMIN — ACETAMINOPHEN 650 MG: 325 TABLET ORAL at 18:41

## 2025-02-13 RX ADMIN — ROSUVASTATIN CALCIUM 20 MG: 20 TABLET, FILM COATED ORAL at 08:56

## 2025-02-13 RX ADMIN — ASPIRIN 81 MG CHEWABLE TABLET 81 MG: 81 TABLET CHEWABLE at 08:56

## 2025-02-13 RX ADMIN — PROCHLORPERAZINE MALEATE 10 MG: 10 TABLET ORAL at 08:56

## 2025-02-13 RX ADMIN — POLYETHYLENE GLYCOL 3350 17 G: 17 POWDER, FOR SOLUTION ORAL at 20:46

## 2025-02-13 RX ADMIN — INSULIN LISPRO 5 UNITS: 100 INJECTION, SOLUTION INTRAVENOUS; SUBCUTANEOUS at 07:52

## 2025-02-13 RX ADMIN — CEFAZOLIN SODIUM 2 G: 2 INJECTION, SOLUTION INTRAVENOUS at 03:47

## 2025-02-13 RX ADMIN — ONDANSETRON 4 MG: 2 INJECTION INTRAMUSCULAR; INTRAVENOUS at 07:52

## 2025-02-13 RX ADMIN — HEPARIN SODIUM 5000 UNITS: 5000 INJECTION, SOLUTION INTRAVENOUS; SUBCUTANEOUS at 09:52

## 2025-02-13 RX ADMIN — METHOCARBAMOL 500 MG: 500 TABLET ORAL at 14:20

## 2025-02-13 RX ADMIN — COLCHICINE 0.6 MG: 0.6 TABLET, FILM COATED ORAL at 20:46

## 2025-02-13 RX ADMIN — OXYCODONE HYDROCHLORIDE 10 MG: 10 TABLET ORAL at 02:26

## 2025-02-13 RX ADMIN — ACETAMINOPHEN 1000 MG: 1000 INJECTION, SOLUTION INTRAVENOUS at 05:48

## 2025-02-13 RX ADMIN — METHOCARBAMOL 1000 MG: 1000 INJECTION, SOLUTION INTRAMUSCULAR; INTRAVENOUS at 07:52

## 2025-02-13 RX ADMIN — METOPROLOL TARTRATE 12.5 MG: 25 TABLET, FILM COATED ORAL at 09:57

## 2025-02-13 RX ADMIN — SENNOSIDES AND DOCUSATE SODIUM 2 TABLET: 50; 8.6 TABLET ORAL at 08:56

## 2025-02-13 RX ADMIN — CLOPIDOGREL BISULFATE 75 MG: 75 TABLET ORAL at 09:52

## 2025-02-13 RX ADMIN — METOCLOPRAMIDE HYDROCHLORIDE 10 MG: 5 INJECTION INTRAMUSCULAR; INTRAVENOUS at 06:09

## 2025-02-13 RX ADMIN — POLYETHYLENE GLYCOL 3350 17 G: 17 POWDER, FOR SOLUTION ORAL at 08:56

## 2025-02-13 RX ADMIN — PROMETHAZINE HYDROCHLORIDE 12.5 MG: 25 INJECTION INTRAMUSCULAR; INTRAVENOUS at 14:20

## 2025-02-13 RX ADMIN — METOPROLOL TARTRATE 12.5 MG: 25 TABLET, FILM COATED ORAL at 20:46

## 2025-02-13 RX ADMIN — HYDRALAZINE HYDROCHLORIDE 10 MG: 20 INJECTION INTRAMUSCULAR; INTRAVENOUS at 00:58

## 2025-02-13 RX ADMIN — CEFAZOLIN SODIUM 2 G: 2 INJECTION, SOLUTION INTRAVENOUS at 12:02

## 2025-02-13 RX ADMIN — COLCHICINE 0.6 MG: 0.6 TABLET, FILM COATED ORAL at 09:57

## 2025-02-13 RX ADMIN — LEVOTHYROXINE SODIUM 125 MCG: 0.12 TABLET ORAL at 05:48

## 2025-02-13 RX ADMIN — HYDRALAZINE HYDROCHLORIDE 10 MG: 20 INJECTION INTRAMUSCULAR; INTRAVENOUS at 06:40

## 2025-02-13 RX ADMIN — OXYCODONE HYDROCHLORIDE 10 MG: 10 TABLET ORAL at 06:40

## 2025-02-13 RX ADMIN — HEPARIN SODIUM 5000 UNITS: 5000 INJECTION, SOLUTION INTRAVENOUS; SUBCUTANEOUS at 18:41

## 2025-02-13 RX ADMIN — HYDROMORPHONE HYDROCHLORIDE 0.2 MG: 0.2 INJECTION, SOLUTION INTRAMUSCULAR; INTRAVENOUS; SUBCUTANEOUS at 05:48

## 2025-02-13 RX ADMIN — PANTOPRAZOLE SODIUM 40 MG: 40 INJECTION, POWDER, FOR SOLUTION INTRAVENOUS at 06:04

## 2025-02-13 SDOH — ECONOMIC STABILITY: INCOME INSECURITY: IN THE PAST 12 MONTHS HAS THE ELECTRIC, GAS, OIL, OR WATER COMPANY THREATENED TO SHUT OFF SERVICES IN YOUR HOME?: NO

## 2025-02-13 SDOH — ECONOMIC STABILITY: HOUSING INSECURITY: IN THE PAST 12 MONTHS, HOW MANY TIMES HAVE YOU MOVED WHERE YOU WERE LIVING?: 0

## 2025-02-13 SDOH — SOCIAL STABILITY: SOCIAL INSECURITY
WITHIN THE LAST YEAR, HAVE YOU BEEN KICKED, HIT, SLAPPED, OR OTHERWISE PHYSICALLY HURT BY YOUR PARTNER OR EX-PARTNER?: NO

## 2025-02-13 SDOH — SOCIAL STABILITY: SOCIAL INSECURITY: ABUSE: ADULT

## 2025-02-13 SDOH — SOCIAL STABILITY: SOCIAL INSECURITY: ARE THERE ANY APPARENT SIGNS OF INJURIES/BEHAVIORS THAT COULD BE RELATED TO ABUSE/NEGLECT?: NO

## 2025-02-13 SDOH — SOCIAL STABILITY: SOCIAL INSECURITY: WITHIN THE LAST YEAR, HAVE YOU BEEN AFRAID OF YOUR PARTNER OR EX-PARTNER?: NO

## 2025-02-13 SDOH — SOCIAL STABILITY: SOCIAL INSECURITY: WITHIN THE LAST YEAR, HAVE YOU BEEN HUMILIATED OR EMOTIONALLY ABUSED IN OTHER WAYS BY YOUR PARTNER OR EX-PARTNER?: NO

## 2025-02-13 SDOH — ECONOMIC STABILITY: HOUSING INSECURITY: IN THE LAST 12 MONTHS, WAS THERE A TIME WHEN YOU WERE NOT ABLE TO PAY THE MORTGAGE OR RENT ON TIME?: NO

## 2025-02-13 SDOH — ECONOMIC STABILITY: HOUSING INSECURITY: AT ANY TIME IN THE PAST 12 MONTHS, WERE YOU HOMELESS OR LIVING IN A SHELTER (INCLUDING NOW)?: NO

## 2025-02-13 SDOH — SOCIAL STABILITY: SOCIAL INSECURITY: HAS ANYONE EVER THREATENED TO HURT YOUR FAMILY OR YOUR PETS?: NO

## 2025-02-13 SDOH — ECONOMIC STABILITY: TRANSPORTATION INSECURITY: IN THE PAST 12 MONTHS, HAS LACK OF TRANSPORTATION KEPT YOU FROM MEDICAL APPOINTMENTS OR FROM GETTING MEDICATIONS?: NO

## 2025-02-13 SDOH — SOCIAL STABILITY: SOCIAL INSECURITY: DOES ANYONE TRY TO KEEP YOU FROM HAVING/CONTACTING OTHER FRIENDS OR DOING THINGS OUTSIDE YOUR HOME?: NO

## 2025-02-13 SDOH — ECONOMIC STABILITY: FOOD INSECURITY: WITHIN THE PAST 12 MONTHS, THE FOOD YOU BOUGHT JUST DIDN'T LAST AND YOU DIDN'T HAVE MONEY TO GET MORE.: NEVER TRUE

## 2025-02-13 SDOH — SOCIAL STABILITY: SOCIAL INSECURITY
WITHIN THE LAST YEAR, HAVE YOU BEEN RAPED OR FORCED TO HAVE ANY KIND OF SEXUAL ACTIVITY BY YOUR PARTNER OR EX-PARTNER?: NO

## 2025-02-13 SDOH — SOCIAL STABILITY: SOCIAL INSECURITY: DO YOU FEEL ANYONE HAS EXPLOITED OR TAKEN ADVANTAGE OF YOU FINANCIALLY OR OF YOUR PERSONAL PROPERTY?: NO

## 2025-02-13 SDOH — SOCIAL STABILITY: SOCIAL INSECURITY: HAVE YOU HAD ANY THOUGHTS OF HARMING ANYONE ELSE?: NO

## 2025-02-13 SDOH — ECONOMIC STABILITY: HOUSING INSECURITY: DO YOU FEEL UNSAFE GOING BACK TO THE PLACE WHERE YOU LIVE?: NO

## 2025-02-13 SDOH — ECONOMIC STABILITY: FOOD INSECURITY: HOW HARD IS IT FOR YOU TO PAY FOR THE VERY BASICS LIKE FOOD, HOUSING, MEDICAL CARE, AND HEATING?: NOT VERY HARD

## 2025-02-13 SDOH — ECONOMIC STABILITY: FOOD INSECURITY: WITHIN THE PAST 12 MONTHS, YOU WORRIED THAT YOUR FOOD WOULD RUN OUT BEFORE YOU GOT THE MONEY TO BUY MORE.: NEVER TRUE

## 2025-02-13 SDOH — SOCIAL STABILITY: SOCIAL INSECURITY: HAVE YOU HAD THOUGHTS OF HARMING ANYONE ELSE?: NO

## 2025-02-13 SDOH — SOCIAL STABILITY: SOCIAL INSECURITY: ARE YOU OR HAVE YOU BEEN THREATENED OR ABUSED PHYSICALLY, EMOTIONALLY, OR SEXUALLY BY ANYONE?: NO

## 2025-02-13 SDOH — SOCIAL STABILITY: SOCIAL INSECURITY
ASK PARENT OR GUARDIAN: ARE THERE TIMES WHEN YOU, YOUR CHILD(REN), OR ANY MEMBER OF YOUR HOUSEHOLD FEEL UNSAFE, HARMED, OR THREATENED AROUND PERSONS WITH WHOM YOU KNOW OR LIVE?: NO

## 2025-02-13 SDOH — SOCIAL STABILITY: SOCIAL INSECURITY: WERE YOU ABLE TO COMPLETE ALL THE BEHAVIORAL HEALTH SCREENINGS?: YES

## 2025-02-13 SDOH — SOCIAL STABILITY: SOCIAL INSECURITY: DO YOU FEEL UNSAFE GOING BACK TO THE PLACE WHERE YOU ARE LIVING?: NO

## 2025-02-13 ASSESSMENT — ACTIVITIES OF DAILY LIVING (ADL)
FEEDING YOURSELF: INDEPENDENT
GROOMING: INDEPENDENT
DRESSING YOURSELF: INDEPENDENT
PATIENT'S MEMORY ADEQUATE TO SAFELY COMPLETE DAILY ACTIVITIES?: YES
ADEQUATE_TO_COMPLETE_ADL: YES
LACK_OF_TRANSPORTATION: NO
HEARING - RIGHT EAR: FUNCTIONAL
LACK_OF_TRANSPORTATION: NO
JUDGMENT_ADEQUATE_SAFELY_COMPLETE_DAILY_ACTIVITIES: YES
TOILETING: INDEPENDENT
ADL_ASSISTANCE: INDEPENDENT
BATHING: INDEPENDENT
WALKS IN HOME: INDEPENDENT
ADLS_ADDRESSED: NO
HEARING - LEFT EAR: FUNCTIONAL

## 2025-02-13 ASSESSMENT — COGNITIVE AND FUNCTIONAL STATUS - GENERAL
CLIMB 3 TO 5 STEPS WITH RAILING: A LOT
PATIENT BASELINE BEDBOUND: NO
MOBILITY SCORE: 24
DRESSING REGULAR UPPER BODY CLOTHING: A LITTLE
DAILY ACTIVITIY SCORE: 18
TOILETING: A LITTLE
DRESSING REGULAR LOWER BODY CLOTHING: A LITTLE
STANDING UP FROM CHAIR USING ARMS: A LITTLE
STANDING UP FROM CHAIR USING ARMS: A LITTLE
MOVING TO AND FROM BED TO CHAIR: A LITTLE
HELP NEEDED FOR BATHING: A LITTLE
MOVING FROM LYING ON BACK TO SITTING ON SIDE OF FLAT BED WITH BEDRAILS: A LITTLE
MOVING TO AND FROM BED TO CHAIR: A LITTLE
CLIMB 3 TO 5 STEPS WITH RAILING: A LOT
STANDING UP FROM CHAIR USING ARMS: A LITTLE
CLIMB 3 TO 5 STEPS WITH RAILING: A LOT
WALKING IN HOSPITAL ROOM: A LITTLE
TURNING FROM BACK TO SIDE WHILE IN FLAT BAD: A LITTLE
DRESSING REGULAR LOWER BODY CLOTHING: A LITTLE
MOVING TO AND FROM BED TO CHAIR: A LITTLE
EATING MEALS: A LITTLE
DRESSING REGULAR UPPER BODY CLOTHING: A LITTLE
EATING MEALS: A LITTLE
WALKING IN HOSPITAL ROOM: A LITTLE
MOBILITY SCORE: 17
PERSONAL GROOMING: A LITTLE
TOILETING: A LITTLE
TURNING FROM BACK TO SIDE WHILE IN FLAT BAD: A LITTLE
WALKING IN HOSPITAL ROOM: A LITTLE
DAILY ACTIVITIY SCORE: 24
HELP NEEDED FOR BATHING: A LITTLE
PERSONAL GROOMING: A LITTLE
DAILY ACTIVITIY SCORE: 18
MOVING FROM LYING ON BACK TO SITTING ON SIDE OF FLAT BED WITH BEDRAILS: A LITTLE
MOBILITY SCORE: 17
MOVING FROM LYING ON BACK TO SITTING ON SIDE OF FLAT BED WITH BEDRAILS: A LITTLE
TURNING FROM BACK TO SIDE WHILE IN FLAT BAD: A LITTLE
MOBILITY SCORE: 17

## 2025-02-13 ASSESSMENT — PATIENT HEALTH QUESTIONNAIRE - PHQ9
2. FEELING DOWN, DEPRESSED OR HOPELESS: NOT AT ALL
1. LITTLE INTEREST OR PLEASURE IN DOING THINGS: NOT AT ALL
SUM OF ALL RESPONSES TO PHQ9 QUESTIONS 1 & 2: 0

## 2025-02-13 ASSESSMENT — PAIN SCALES - GENERAL
PAINLEVEL_OUTOF10: 2
PAINLEVEL_OUTOF10: 8
PAINLEVEL_OUTOF10: 6
PAINLEVEL_OUTOF10: 10 - WORST POSSIBLE PAIN
PAINLEVEL_OUTOF10: 3
PAINLEVEL_OUTOF10: 5 - MODERATE PAIN
PAINLEVEL_OUTOF10: 0 - NO PAIN
PAINLEVEL_OUTOF10: 7
PAINLEVEL_OUTOF10: 3

## 2025-02-13 ASSESSMENT — LIFESTYLE VARIABLES
HOW MANY STANDARD DRINKS CONTAINING ALCOHOL DO YOU HAVE ON A TYPICAL DAY: PATIENT DOES NOT DRINK
HOW OFTEN DO YOU HAVE A DRINK CONTAINING ALCOHOL: NEVER
HOW OFTEN DO YOU HAVE 6 OR MORE DRINKS ON ONE OCCASION: NEVER
AUDIT-C TOTAL SCORE: 0
SKIP TO QUESTIONS 9-10: 1
AUDIT-C TOTAL SCORE: 0

## 2025-02-13 ASSESSMENT — PAIN - FUNCTIONAL ASSESSMENT
PAIN_FUNCTIONAL_ASSESSMENT: 0-10

## 2025-02-13 ASSESSMENT — PAIN DESCRIPTION - ORIENTATION
ORIENTATION: LEFT

## 2025-02-13 ASSESSMENT — PAIN DESCRIPTION - LOCATION
LOCATION: CHEST

## 2025-02-13 NOTE — ADDENDUM NOTE
Addendum  created 02/13/25 1156 by Scottie Murrieta    Intraprocedure Event edited (Perfusion), Intraprocedure Staff edited (Perfusion)

## 2025-02-13 NOTE — CARE PLAN
The patient's goals for the shift include      Problem: Pain  Goal: Takes deep breaths with improved pain control throughout the shift  Outcome: Progressing  Goal: Turns in bed with improved pain control throughout the shift  Outcome: Progressing  Goal: Walks with improved pain control throughout the shift  Outcome: Progressing  Goal: Performs ADL's with improved pain control throughout shift  Outcome: Progressing  Goal: Participates in PT with improved pain control throughout the shift  Outcome: Progressing  Goal: Free from opioid side effects throughout the shift  Outcome: Progressing  Goal: Free from acute confusion related to pain meds throughout the shift  Outcome: Progressing       The clinical goals for the shift include Pt will remain hemodynamically stable this shift

## 2025-02-13 NOTE — PROGRESS NOTES
"CTICU Progress Note  Tracy Santoyo/[id number]    Admit Date: 2/12/2025  Hospital Length of Stay: 1   ICU Length of Stay: 16h   CT SURGEON:     SUBJECTIVE:   NAOE  Nauseas this morning with dry heaving  Pain mostly controlled with PO and IV medications  No chest pain or SOB    MEDICATIONS  Infusions:  lactated Ringer's, Last Rate: 5 mL/hr (02/13/25 0700)      Scheduled:  acetaminophen, 1,000 mg, q6h RENETTA  aspirin, 81 mg, Daily  ceFAZolin, 2 g, q8h  insulin lispro, 0-15 Units, q4h  levothyroxine, 125 mcg, Daily  polyethylene glycol, 17 g, Daily  rosuvastatin, 20 mg, Daily  sennosides-docusate sodium, 2 tablet, BID      PRN:  alteplase, 2 mg, PRN  calcium gluconate, 1 g, q6h PRN  calcium gluconate, 2 g, q6h PRN  dextrose, 25 g, q15 min PRN   Or  glucagon, 1 mg, q15 min PRN  hydrALAZINE, 10 mg, q4h PRN  HYDROmorphone, 0.2 mg, q15 min PRN  magnesium sulfate, 2 g, q6h PRN  magnesium sulfate, 4 g, q6h PRN  metoclopramide, 10 mg, q6h PRN  naloxone, 0.2 mg, q5 min PRN  ondansetron, 4 mg, q8h PRN   Or  ondansetron, 4 mg, q8h PRN  oxyCODONE, 10 mg, q4h PRN  oxyCODONE, 5 mg, q4h PRN  oxygen, , Continuous PRN - O2/gases  potassium chloride CR, 20 mEq, q6h PRN   Or  potassium chloride, 20 mEq, q6h PRN  potassium chloride CR, 40 mEq, q6h PRN   Or  potassium chloride, 40 mEq, q6h PRN  potassium chloride, 20 mEq, q6h PRN  potassium chloride, 40 mEq, q6h PRN        PHYSICAL EXAM:   Visit Vitals  /64   Pulse 77   Temp (!) 38 °C (100.4 °F)   Resp 16   Ht 1.676 m (5' 5.98\")   Wt 71 kg (156 lb 8.4 oz)   LMP  (LMP Unknown)   SpO2 97%   BMI 25.28 kg/m²   OB Status Postmenopausal   Smoking Status Never   BSA 1.82 m²     Wt Readings from Last 5 Encounters:   02/13/25 71 kg (156 lb 8.4 oz)   02/03/25 71.6 kg (157 lb 12.8 oz)   01/24/25 72.6 kg (160 lb)   01/20/25 72.1 kg (159 lb)   01/15/25 72.6 kg (160 lb)     INTAKE/OUTPUT:  I/O last 3 completed shifts:  In: 3327.8 (46.9 mL/kg) [I.V.:827.8 (11.7 mL/kg); IV Piggyback:2500]  Out: 2095 " (29.5 mL/kg) [Urine:1625 (0.6 mL/kg/hr); Emesis/NG output:110; Blood:150; Chest Tube:210]  Weight: 71 kg          Vent settings:  Vent Mode: Pressure support  FiO2 (%):  [40 %-50 %] 40 %  S RR:  [16] 16  S VT:  [470 mL] 470 mL  PEEP/CPAP (cm H2O):  [8 cm H20] 8 cm H20  NE SUP:  [5 cm H20] 5 cm H20  MAP (cm H2O):  [10-13] 10    Physical Exam:   - CONSTITUTION: NAD, sitting up in chair   - NEUROLOGIC: no focal deficits  - CARDIOVASCULAR: RRR  - RESPIRATORY: on 1.5L NC  - CHEST: surgical dressings intact; chest tube in place with SS output, no air leak  - GI: abdomen soft, nontender, nondistended  - : carmona in place draining clear yellow urine   - EXTREMITIES: warm, well perfused, no peripheral edema   - SKIN: no rashes or lesions  - PSYCHIATRIC: normal affect     Daily Risk Screen  Intubated: No   Central line: Yes, remove today   Carmona: Yes, remove today     Images:     Invasive Hemodynamics:    Most Recent Range Past 24hrs   BP (Art) 121/53 Arterial Line BP 1  Min: 113/62  Max: 171/74   MAP(Art) 79 mmHg Arterial Line MAP 1 (mmHg)   Min: 73 mmHg  Max: 116 mmHg   RA/CVP   No data recorded   PA   No data recorded   PA(mean)   No data recorded   CO   No data recorded   CI   No data recorded   Mixed Venous   No data recorded   SVR    No data recorded         Assessment/Plan   Ms. Santoyo is a 74 year old female with PMH CAD, PVC's, HLD, hypothyroidism, chronic UTI, trigeminal neuralgia who had a recent heart cath showing 2 vessel disease. She is now s/p robotic assisted minimally invasive CABG pedicled MIRANDA to LAD with Dr. Ian Schmitt. She was extubated on POD 0. She initially required a nitroglycerin drip for hypertension, it was weaned off overnight on POD 0. She is now POD 1 and recovering well; only issue this morning is nausea.      Plan:  NEURO: Acute post operative pain.   -->  - Serial neuro and pain assessments   - Scheduled Tylenol   - PRN oxycodone  - PRN dilaudid   - PT Consult, OOB to chair as tolerated, chair  position if not tolerated   - Sleep/wake cycle hygiene     CV:  Patient has a history of CAD and is now status post robotic assisted minimally invasive off-pump CABG with pedicle MIRANDA to LAD. Will need circumflex stented. Pre/Post EF: 65% -->  - Maintain goal MAP 60-70  - Start plavix and colchicine today   - Start metoprolol  - Continue ASA and statin  - Hold home diltiazem, restart when clinically appropriate  - PRN hydralazine for SBP > 140   - Hold home Imdur     PULM:  No history of pulmonary disease. 1 left pleural chest tube. On 1.5L NC this morning.-->  - AM CXR appropriate  - Wean FiO2 maintaining SpO2 >92%.   - IS q1h and OOB to chair when extubated  - Remove left pleural chest tube      GI: No pertinent GI history. Postoperative nausea. ->  - No PPI indicated   - Cardiac diet   - PRN zofran  - Colace/senna BID and miralax BID     :  CSA-EMILY Risk Score 3.  No history of renal disease, baseline creatinine 0.8. Creatinine stable post-op. Carmona in place and making adequate UOP. Chronic UTI  -->  - Remove carmona catheter   - Goal UOP 0.5ml/kg/hr  - RFP as clinically indicated  - Replete electrolytes per CTICU protocol  - Restart home nitrofurantoin     ENDO: PMH hypothyroidism-->  - Maintain BG <180, insulin per CTICU protocol  - Continue home synthroid      HEME:  Acute blood loss anemia and thrombocytopenia.-->    - Monitor drain output volume and characteristics  - ASA and plavix   - SQH and SCDs for DVT prophylaxis.  - Last type and screen: 2/12     ID:  Tmax 38.2 overnight, no current indications of infection. MRSA negative .-->  - Trend temp q4h  - Periop cefazolin x 48hrs     Skin:  No active skin issues.  - preventative Mepilex dressings in place on sacrum and heels  - change preventative Mepilex weekly or more frequently as indicated (when moist/soiled)   - every shift skin assessment per nursing and weekly ICU skin rounds  - moisture barrier to be applied with carrol care  - active skin problems  addressed with nursing on daily rounds     Proph:  SCDs  SQH     G:  Line  Right IJ MAC w Minimac placed 2/12, remove   Left brachial a-line placed 2/12, remove     Code status: Full Code  Emergency contact: Greyson Hallman I personally spent 35 minutes of critical care time directly and personally managing the patient exclusive of separately billable procedures.     A,B,C,D,E,F,G: reviewed     Dispo: Transfer to [facility].   CTICU TEAM PHONE [phone]    Tammy Horowitz MD

## 2025-02-13 NOTE — PROGRESS NOTES
Physical Therapy    Physical Therapy Evaluation    Patient Name: Tracy Santoyo  MRN: [id number]  [place] [facility]  Room: 01/01-A  Today's Date: 2/13/2025   Time Calculation  Start Time: 0958  Stop Time: 1018  Time Calculation (min): 20 min    Assessment/Plan   PT Assessment  PT Assessment Results: Decreased strength, Decreased endurance, Impaired balance, Decreased mobility, Pain  Rehab Prognosis: Good  Barriers to Discharge Home: No anticipated barriers  Evaluation/Treatment Tolerance:  (Limited d/t nausea and emesis)  Medical Staff Made Aware: Yes  End of Session Communication: Bedside nurse  Assessment Comment: Pt demonstrated ability to complete bed mobility, sit<>stand transfer and ambulation ~20ft with thoracic walker.  CGA provided with all mobility.  Nausea and emesis with ambulation, limiting distance.  Vitals stable.  End of Session Patient Position: Bed, 3 rail up, Alarm off, not on at start of session  IP OR SWING BED PT PLAN  Inpatient or Swing Bed: Inpatient  PT Plan  Treatment/Interventions: Bed mobility, Transfer training, Stair training, Balance training, Gait training, Strengthening, Endurance training, Therapeutic exercise, Therapeutic activity  PT Plan: Ongoing PT  PT Frequency: 3 times per week  PT Discharge Recommendations: Low intensity level of continued care  PT Recommended Transfer Status: Assistive device, Contact guard  PT - OK to Discharge: Yes    Subjective   General Visit Information:  General  Reason for Referral: Robotic assisted minimally invasive CABG pedicled LIMA to LAD  Referred By: Dr. Ian Schmitt.  Past Medical History Relevant to Rehab: CAD, PVC's, HLD, hypothyroidism, chronic UTI, trigeminal neuralgia  Family/Caregiver Present: No  Prior to Session Communication: Bedside nurse  Patient Position Received: Up in chair, Alarm off, not on at start of session  Preferred Learning Style: auditory, verbal  General Comment: Pt awake, alert and willing to participate in PT  session  Home Living:  Home Living  Type of Home: House  Lives With: Spouse  Home Layout:  (1 JASWINDER, bed/bath - 1st floor, walk in shower with GB and shower bench)  Prior Level of Function:  Prior Function Per Pt/Caregiver Report  Level of [place]: Independent with ADLs and functional transfers, Independent with homemaking with ambulation  ADL Assistance: Independent  Homemaking Assistance: Independent  Ambulatory Assistance: Independent  Vocational: Retired  Prior Function Comments: (+) drives, (-) falls  Precautions:  Precautions  Hearing/Visual Limitations: [place], vision WFL  Medical Precautions: Cardiac precautions, Fall precautions, Oxygen therapy device and L/min, Chest tube (CT x1, 2L)  Precautions Comment: MAP 60-70, SBP <140 per RN, SpO2 >92%      02/13/25 0958 02/13/25 1018   Vital Signs   Vitals Session Pre PT Post PT   Heart Rate 76 71   Resp 21 20   SpO2 98 % 98 %   /63 153/59   MAP (mmHg) 81 94   BP Method Arterial line Arterial line   Patient Position Sitting Lying   Vital Signs Comment  --  Vitals stable with mobility  (Nausea + emesis with mobility)     Objective   Pain:  Pain Assessment  Pain Assessment:  (0/10 start of session; 5/10 end of session)  Cognition:  Cognition  Overall Cognitive Status: Within Functional Limits  Orientation Level: Oriented X4    General Assessments:  General Observation  General Observation: Tele, juan jose, CVP, carmona, CVC - increased time for skilled ICU line/tube management for safe functional mobility     Activity Tolerance  Endurance: Tolerates less than 10 min exercise, no significant change in vital signs  Early Mobility/Exercise Safety Screen: Proceed with mobilization - No exclusion criteria met  Activity Tolerance Comments: Limited mobility d/t nausea and emesis    Sensation  Light Touch: No apparent deficits (Post-op)    Strength  Strength Comments: BLEs at least 3/5 shown through functional mobility  Coordination  Movements are Fluid and Coordinated:  Yes    Postural Control  Postural Control: Within Functional Limits    Static Sitting Balance  Static Sitting-Balance Support: Bilateral upper extremity supported, Feet supported  Static Sitting-Level of Assistance: Close supervision    Static Standing Balance  Static Standing-Balance Support: Bilateral upper extremity supported  Static Standing-Level of Assistance: Contact guard  Static Standing-Comment/Number of Minutes: Thoracic walker  Functional Assessments:  ADL  ADL's Addressed: No    Bed Mobility  Bed Mobility: Yes  Bed Mobility 1  Bed Mobility 1: Sitting to supine  Level of Assistance 1: Contact guard  Bed Mobility Comments 1: HOB elevated    Transfers  Transfer: Yes  Transfer 1  Transfer From 1: Sit to, Stand to  Transfer to 1: Sit, Stand  Technique 1: Sit to stand, Stand to sit  Transfer Device 1: Thoracic walker  Transfer Level of Assistance 1: Contact guard  Trials/Comments 1: Cues for hand placement and proper use of AD    Ambulation/Gait Training  Ambulation/Gait Training Performed: Yes  Ambulation/Gait Training 1  Surface 1: Level tile  Device 1: Thoracic walker  Assistance 1: Contact guard  Comments/Distance (ft) 1: ~20ft (Emesis with ambulation limiting distance)    Stairs  Stairs: No  Extremity/Trunk Assessments:  RUE   RUE : Within Functional Limits  LUE   LUE: Within Functional Limits  RLE   RLE : Within Functional Limits  LLE   LLE : Within Functional Limits  Outcome Measures:  [facility] Basic Mobility  Turning from your back to your side while in a flat bed without using bedrails: A little  Moving from lying on your back to sitting on the side of a flat bed without using bedrails: A little  Moving to and from bed to chair (including a wheelchair): A little  Standing up from a chair using your arms (e.g. wheelchair or bedside chair): A little  To walk in hospital room: A little  Climbing 3-5 steps with railing: A lot  Basic Mobility - Total Score: 17    FSS-ICU  Ambulation: Walks <50 feet with any  assistance x1 or walks any distance with assistance x2 people  Rolling: Supervision or set-up only  Sitting: Supervision or set-up only  Transfer Sit-to-Stand: Supervision or set-up only  Transfer Supine-to-Sit: Supervision or set-up only  Total Score: 21    Early Mobility/Exercise Safety Screen: Proceed with mobilization - No exclusion criteria met  ICU Mobility Scale: Walking with assistance of 1 person [8]    Encounter Problems       Encounter Problems (Active)       Balance       Pt will demonstrated ability to score at least 24/28 on the Tinetti balance assessment tool to ensure safety upon D/C.  (Progressing)       Start:  02/13/25    Expected End:  02/27/25               Mobility       Pt will demonstrated ability to ambulate >/=300ft with proper form and no balance deficits for safe home going.   (Progressing)       Start:  02/13/25    Expected End:  02/27/25            Pt will demonstrate ability to ascend/descend 3 stairs with unilateral rail and no balance deficits for safe home going.  (Progressing)       Start:  02/13/25    Expected End:  02/27/25               PT Transfers       Pt will demonstrate ability to complete 5X STS in < 12 sec with good from and consistency between transfers for safe D/C.  (Progressing)       Start:  02/13/25    Expected End:  02/27/25                   Education Documentation  Body Mechanics, taught by Charlotte Middleton, PT at 2/13/2025  2:32 PM.  Learner: Patient  Readiness: Acceptance  Method: Explanation, Demonstration  Response: Demonstrated Understanding, Verbalizes Understanding    Mobility Training, taught by Charlotte Middleton, PT at 2/13/2025  2:32 PM.  Learner: Patient  Readiness: Acceptance  Method: Explanation, Demonstration  Response: Demonstrated Understanding, Verbalizes Understanding    Education Comments  No comments found.

## 2025-02-14 ENCOUNTER — APPOINTMENT (OUTPATIENT)
Dept: RADIOLOGY | Facility: HOSPITAL | Age: 75
DRG: 236 | End: 2025-02-14
Payer: MEDICARE

## 2025-02-14 LAB
ALBUMIN SERPL BCP-MCNC: 3.7 G/DL (ref 3.4–5)
ANION GAP SERPL CALC-SCNC: 13 MMOL/L (ref 10–20)
BUN SERPL-MCNC: 14 MG/DL (ref 6–23)
CALCIUM SERPL-MCNC: 9.4 MG/DL (ref 8.6–10.6)
CHLORIDE SERPL-SCNC: 106 MMOL/L (ref 98–107)
CO2 SERPL-SCNC: 24 MMOL/L (ref 21–32)
CREAT SERPL-MCNC: 0.8 MG/DL (ref 0.5–1.05)
EGFRCR SERPLBLD CKD-EPI 2021: 76 ML/MIN/1.73M*2
ERYTHROCYTE [DISTWIDTH] IN BLOOD BY AUTOMATED COUNT: 14.8 % (ref 11.5–14.5)
GLUCOSE BLD MANUAL STRIP-MCNC: 107 MG/DL (ref 74–99)
GLUCOSE BLD MANUAL STRIP-MCNC: 114 MG/DL (ref 74–99)
GLUCOSE BLD MANUAL STRIP-MCNC: 149 MG/DL (ref 74–99)
GLUCOSE BLD MANUAL STRIP-MCNC: 94 MG/DL (ref 74–99)
GLUCOSE SERPL-MCNC: 110 MG/DL (ref 74–99)
HCT VFR BLD AUTO: 36.1 % (ref 36–46)
HGB BLD-MCNC: 11.7 G/DL (ref 12–16)
MAGNESIUM SERPL-MCNC: 2.15 MG/DL (ref 1.6–2.4)
MCH RBC QN AUTO: 29.9 PG (ref 26–34)
MCHC RBC AUTO-ENTMCNC: 32.4 G/DL (ref 32–36)
MCV RBC AUTO: 92 FL (ref 80–100)
NRBC BLD-RTO: 0 /100 WBCS (ref 0–0)
PHOSPHATE SERPL-MCNC: 1.6 MG/DL (ref 2.5–4.9)
PLATELET # BLD AUTO: 201 X10*3/UL (ref 150–450)
POTASSIUM SERPL-SCNC: 3.7 MMOL/L (ref 3.5–5.3)
RBC # BLD AUTO: 3.91 X10*6/UL (ref 4–5.2)
SODIUM SERPL-SCNC: 139 MMOL/L (ref 136–145)
WBC # BLD AUTO: 14.7 X10*3/UL (ref 4.4–11.3)

## 2025-02-14 PROCEDURE — 71045 X-RAY EXAM CHEST 1 VIEW: CPT | Performed by: RADIOLOGY

## 2025-02-14 PROCEDURE — 1200000002 HC GENERAL ROOM WITH TELEMETRY DAILY

## 2025-02-14 PROCEDURE — 36415 COLL VENOUS BLD VENIPUNCTURE: CPT | Performed by: NURSE PRACTITIONER

## 2025-02-14 PROCEDURE — 99233 SBSQ HOSP IP/OBS HIGH 50: CPT | Performed by: NURSE PRACTITIONER

## 2025-02-14 PROCEDURE — 83735 ASSAY OF MAGNESIUM: CPT | Performed by: NURSE PRACTITIONER

## 2025-02-14 PROCEDURE — 2500000001 HC RX 250 WO HCPCS SELF ADMINISTERED DRUGS (ALT 637 FOR MEDICARE OP): Performed by: NURSE PRACTITIONER

## 2025-02-14 PROCEDURE — 71045 X-RAY EXAM CHEST 1 VIEW: CPT

## 2025-02-14 PROCEDURE — 2500000005 HC RX 250 GENERAL PHARMACY W/O HCPCS: Performed by: NURSE PRACTITIONER

## 2025-02-14 PROCEDURE — P9047 ALBUMIN (HUMAN), 25%, 50ML: HCPCS | Performed by: NURSE PRACTITIONER

## 2025-02-14 PROCEDURE — 2500000004 HC RX 250 GENERAL PHARMACY W/ HCPCS (ALT 636 FOR OP/ED): Performed by: NURSE PRACTITIONER

## 2025-02-14 PROCEDURE — 2500000002 HC RX 250 W HCPCS SELF ADMINISTERED DRUGS (ALT 637 FOR MEDICARE OP, ALT 636 FOR OP/ED): Performed by: NURSE PRACTITIONER

## 2025-02-14 PROCEDURE — 2500000004 HC RX 250 GENERAL PHARMACY W/ HCPCS (ALT 636 FOR OP/ED)

## 2025-02-14 PROCEDURE — 80069 RENAL FUNCTION PANEL: CPT | Performed by: NURSE PRACTITIONER

## 2025-02-14 PROCEDURE — 85027 COMPLETE CBC AUTOMATED: CPT | Performed by: NURSE PRACTITIONER

## 2025-02-14 PROCEDURE — 82947 ASSAY GLUCOSE BLOOD QUANT: CPT

## 2025-02-14 RX ORDER — FUROSEMIDE 10 MG/ML
20 INJECTION INTRAMUSCULAR; INTRAVENOUS ONCE
Status: COMPLETED | OUTPATIENT
Start: 2025-02-14 | End: 2025-02-14

## 2025-02-14 RX ORDER — METOPROLOL TARTRATE 1 MG/ML
5 INJECTION, SOLUTION INTRAVENOUS ONCE
Status: COMPLETED | OUTPATIENT
Start: 2025-02-14 | End: 2025-02-14

## 2025-02-14 RX ORDER — MAGNESIUM SULFATE HEPTAHYDRATE 40 MG/ML
2 INJECTION, SOLUTION INTRAVENOUS ONCE
Status: COMPLETED | OUTPATIENT
Start: 2025-02-14 | End: 2025-02-14

## 2025-02-14 RX ORDER — ALBUMIN HUMAN 250 G/1000ML
25 SOLUTION INTRAVENOUS ONCE
Status: COMPLETED | OUTPATIENT
Start: 2025-02-14 | End: 2025-02-14

## 2025-02-14 RX ORDER — COLCHICINE 0.6 MG/1
0.6 TABLET ORAL DAILY
Status: DISCONTINUED | OUTPATIENT
Start: 2025-02-15 | End: 2025-02-15

## 2025-02-14 RX ORDER — METOPROLOL TARTRATE 1 MG/ML
INJECTION, SOLUTION INTRAVENOUS
Status: COMPLETED
Start: 2025-02-14 | End: 2025-02-14

## 2025-02-14 RX ADMIN — ACETAMINOPHEN 650 MG: 325 TABLET ORAL at 00:04

## 2025-02-14 RX ADMIN — HEPARIN SODIUM 5000 UNITS: 5000 INJECTION, SOLUTION INTRAVENOUS; SUBCUTANEOUS at 09:20

## 2025-02-14 RX ADMIN — ROSUVASTATIN CALCIUM 40 MG: 20 TABLET, FILM COATED ORAL at 09:19

## 2025-02-14 RX ADMIN — Medication 21 PERCENT: at 09:22

## 2025-02-14 RX ADMIN — POTASSIUM PHOSPHATE, MONOBASIC 500 MG: 500 TABLET, SOLUBLE ORAL at 14:22

## 2025-02-14 RX ADMIN — METOPROLOL TARTRATE 12.5 MG: 25 TABLET, FILM COATED ORAL at 09:19

## 2025-02-14 RX ADMIN — HEPARIN SODIUM 5000 UNITS: 5000 INJECTION, SOLUTION INTRAVENOUS; SUBCUTANEOUS at 01:27

## 2025-02-14 RX ADMIN — CEFAZOLIN SODIUM 2 G: 2 INJECTION, SOLUTION INTRAVENOUS at 05:41

## 2025-02-14 RX ADMIN — METHOCARBAMOL 500 MG: 500 TABLET ORAL at 05:41

## 2025-02-14 RX ADMIN — CLOPIDOGREL BISULFATE 75 MG: 75 TABLET ORAL at 09:20

## 2025-02-14 RX ADMIN — METHOCARBAMOL 500 MG: 500 TABLET ORAL at 23:43

## 2025-02-14 RX ADMIN — ACETAMINOPHEN 650 MG: 325 TABLET ORAL at 05:41

## 2025-02-14 RX ADMIN — AMIODARONE HYDROCHLORIDE 1 MG/MIN: 1.8 INJECTION, SOLUTION INTRAVENOUS at 16:40

## 2025-02-14 RX ADMIN — ACETAMINOPHEN 650 MG: 325 TABLET ORAL at 18:17

## 2025-02-14 RX ADMIN — ACETAMINOPHEN 650 MG: 325 TABLET ORAL at 12:03

## 2025-02-14 RX ADMIN — HEPARIN SODIUM 5000 UNITS: 5000 INJECTION, SOLUTION INTRAVENOUS; SUBCUTANEOUS at 18:17

## 2025-02-14 RX ADMIN — METOPROLOL TARTRATE 12.5 MG: 25 TABLET, FILM COATED ORAL at 21:08

## 2025-02-14 RX ADMIN — POTASSIUM PHOSPHATE, MONOBASIC 500 MG: 500 TABLET, SOLUBLE ORAL at 16:34

## 2025-02-14 RX ADMIN — AMIODARONE HYDROCHLORIDE 1 MG/MIN: 1.8 INJECTION, SOLUTION INTRAVENOUS at 10:49

## 2025-02-14 RX ADMIN — ALBUMIN HUMAN 25 G: 0.25 SOLUTION INTRAVENOUS at 12:05

## 2025-02-14 RX ADMIN — METOPROLOL TARTRATE 5 MG: 1 INJECTION, SOLUTION INTRAVENOUS at 01:23

## 2025-02-14 RX ADMIN — FUROSEMIDE 20 MG: 10 INJECTION, SOLUTION INTRAVENOUS at 16:34

## 2025-02-14 RX ADMIN — ASPIRIN 81 MG CHEWABLE TABLET 81 MG: 81 TABLET CHEWABLE at 09:20

## 2025-02-14 RX ADMIN — LEVOTHYROXINE SODIUM 125 MCG: 0.12 TABLET ORAL at 05:41

## 2025-02-14 RX ADMIN — POLYETHYLENE GLYCOL 3350 17 G: 17 POWDER, FOR SOLUTION ORAL at 21:07

## 2025-02-14 RX ADMIN — CEFAZOLIN SODIUM 2 G: 2 INJECTION, SOLUTION INTRAVENOUS at 14:21

## 2025-02-14 RX ADMIN — COLCHICINE 0.6 MG: 0.6 TABLET, FILM COATED ORAL at 09:20

## 2025-02-14 RX ADMIN — METHOCARBAMOL 500 MG: 500 TABLET ORAL at 18:17

## 2025-02-14 RX ADMIN — POTASSIUM PHOSPHATE, MONOBASIC 500 MG: 500 TABLET, SOLUBLE ORAL at 21:07

## 2025-02-14 RX ADMIN — ACETAMINOPHEN 650 MG: 325 TABLET ORAL at 23:43

## 2025-02-14 RX ADMIN — AMIODARONE HYDROCHLORIDE 150 MG: 1.5 INJECTION, SOLUTION INTRAVENOUS at 10:36

## 2025-02-14 RX ADMIN — NITROFURANTOIN 50 MG: 25 SUSPENSION ORAL at 09:20

## 2025-02-14 RX ADMIN — MAGNESIUM SULFATE HEPTAHYDRATE 2 G: 40 INJECTION, SOLUTION INTRAVENOUS at 11:16

## 2025-02-14 RX ADMIN — METHOCARBAMOL 500 MG: 500 TABLET ORAL at 12:03

## 2025-02-14 ASSESSMENT — PAIN - FUNCTIONAL ASSESSMENT
PAIN_FUNCTIONAL_ASSESSMENT: 0-10

## 2025-02-14 ASSESSMENT — COGNITIVE AND FUNCTIONAL STATUS - GENERAL
MOBILITY SCORE: 22
DRESSING REGULAR LOWER BODY CLOTHING: A LITTLE
HELP NEEDED FOR BATHING: A LITTLE
WALKING IN HOSPITAL ROOM: A LITTLE
CLIMB 3 TO 5 STEPS WITH RAILING: A LITTLE
DRESSING REGULAR UPPER BODY CLOTHING: A LITTLE
DAILY ACTIVITIY SCORE: 21

## 2025-02-14 ASSESSMENT — PAIN SCALES - GENERAL
PAINLEVEL_OUTOF10: 4
PAINLEVEL_OUTOF10: 4
PAINLEVEL_OUTOF10: 2
PAINLEVEL_OUTOF10: 2
PAINLEVEL_OUTOF10: 5 - MODERATE PAIN
PAINLEVEL_OUTOF10: 5 - MODERATE PAIN
PAINLEVEL_OUTOF10: 4
PAINLEVEL_OUTOF10: 3

## 2025-02-14 NOTE — CARE PLAN
The patient's goals for the shift include      The clinical goals for the shift include Pt will remain HDS this shift      Problem: Skin  Goal: Decreased wound size/increased tissue granulation at next dressing change  2/14/2025 1135 by Tania May RN  Outcome: Progressing  2/14/2025 1135 by Tania May RN  Outcome: Progressing  Goal: Participates in plan/prevention/treatment measures  2/14/2025 1135 by Tania May RN  Outcome: Progressing  2/14/2025 1135 by Tania May RN  Outcome: Progressing  Goal: Prevent/manage excess moisture  2/14/2025 1135 by Tania May RN  Outcome: Progressing  2/14/2025 1135 by Tania May RN  Outcome: Progressing  Goal: Prevent/minimize sheer/friction injuries  2/14/2025 1135 by Tania May RN  Outcome: Progressing  2/14/2025 1135 by Tania May RN  Outcome: Progressing  Goal: Promote/optimize nutrition  2/14/2025 1135 by Tania May RN  Outcome: Progressing  2/14/2025 1135 by Tania May RN  Outcome: Progressing  Goal: Promote skin healing  2/14/2025 1135 by Tania May RN  Outcome: Progressing  2/14/2025 1135 by Tania May RN  Outcome: Progressing     Problem: Pain  Goal: Takes deep breaths with improved pain control throughout the shift  2/14/2025 1135 by Tania May RN  Outcome: Progressing  2/14/2025 1135 by Tania May RN  Outcome: Progressing  Goal: Turns in bed with improved pain control throughout the shift  2/14/2025 1135 by Tania May RN  Outcome: Progressing  2/14/2025 1135 by Tania May RN  Outcome: Progressing  Goal: Walks with improved pain control throughout the shift  2/14/2025 1135 by Tania May RN  Outcome: Progressing  2/14/2025 1135 by Tania May RN  Outcome: Progressing  Goal: Performs ADL's with improved pain control throughout shift  2/14/2025 1135 by Tania May RN  Outcome: Progressing  2/14/2025 1135 by Tania May RN  Outcome:  Progressing  Goal: Participates in PT with improved pain control throughout the shift  2/14/2025 1135 by Tania May RN  Outcome: Progressing  2/14/2025 1135 by Tania May RN  Outcome: Progressing  Goal: Free from opioid side effects throughout the shift  2/14/2025 1135 by Tania May RN  Outcome: Progressing  2/14/2025 1135 by Tania May RN  Outcome: Progressing  Goal: Free from acute confusion related to pain meds throughout the shift  2/14/2025 1135 by Tania May RN  Outcome: Progressing  2/14/2025 1135 by Tania May RN  Outcome: Progressing     Problem: Pain - Adult  Goal: Verbalizes/displays adequate comfort level or baseline comfort level  2/14/2025 1135 by Tania May RN  Outcome: Progressing  2/14/2025 1135 by Tania May RN  Outcome: Progressing     Problem: Safety - Adult  Goal: Free from fall injury  2/14/2025 1135 by Tania May RN  Outcome: Progressing  2/14/2025 1135 by Tania May RN  Outcome: Progressing     Problem: Discharge Planning  Goal: Discharge to home or other facility with appropriate resources  2/14/2025 1135 by Tania May RN  Outcome: Progressing  2/14/2025 1135 by Tania May RN  Outcome: Progressing     Problem: Chronic Conditions and Co-morbidities  Goal: Patient's chronic conditions and co-morbidity symptoms are monitored and maintained or improved  2/14/2025 1135 by Tania May RN  Outcome: Progressing  2/14/2025 1135 by Tania May RN  Outcome: Progressing     Problem: Nutrition  Goal: Nutrient intake appropriate for maintaining nutritional needs  2/14/2025 1135 by Tania May RN  Outcome: Progressing  2/14/2025 1135 by Tania May RN  Outcome: Progressing

## 2025-02-14 NOTE — PROGRESS NOTES
02/14/25 1012   Discharge Planning   Living Arrangements Spouse/significant other   Support Systems Spouse/significant other   Assistance Needed independent   Type of Residence Private residence   Number of Stairs to Enter Residence 0   Number of Stairs Within Residence 0   Do you have animals or pets at home? No   Who is requesting discharge planning? Provider   Home or Post Acute Services None   Expected Discharge Disposition Home H  ([facility])   Does the patient need discharge transport arranged? No     Met with patient and introduced myself as Care Coordinator and member of the discharge planning team.  Patient is s/p CABG x1. She plans to return home at time of discharge with her . She was independent in ADL's prior to hospitalization. She has a cane, maybe a walker. Her PCP is Dr. Duncan. She uses [facility] in [place]. Patient would like to use  Home Care services at [facility]. Care Coordinator will continue to follow for home going needs.

## 2025-02-14 NOTE — CARE PLAN
The patient's goals for the shift include  rest     The clinical goals for the shift include pt will remain HDS throughout the shift

## 2025-02-14 NOTE — PROGRESS NOTES
"CARDIAC SURGERY DAILY PROGRESS NOTE    Tracy Santoyo is a 74 y.o. female, with a PMH of CAD, PVC, HLD, Hypothyroidism, trigeminal neuralgia, who presented to [facility] on 2/12/2025 for CABG eval.       OPERATION/PROCEDURE: 2/14 with Edgar Schmitt MD  CABG X1, ROBOT-ASSISTED, LIMA TO LAD MID CAB  [address]671 - [place] CABG W/ARTERIAL GRAFT SINGLE ARTERIAL GRAFT     1.  Robotic assisted mini invasive off-pump CABG with pedicle LIMA to LAD    CTICU Course: uneventful  Transferred to T3 on 2/13      Interval History:   Afib RVR this morning    SUBJECTIVE:  Denies SOB, chest pain or abdominal pain    Objective   BP 90/56 (BP Location: Left arm, Patient Position: Sitting) Comment: rn notified  Pulse (!) 112 Comment: rn notified  Temp 37.2 °C (99 °F) (Temporal)   Resp 20   Ht 1.676 m (5' 5.98\")   Wt 72.7 kg (160 lb 4.8 oz)   LMP  (LMP Unknown)   SpO2 95%   BMI 25.89 kg/m²   0-10 (Numeric) Pain Score: 4   3 Day Weight Change: Unable to Calculate    Intake and Output    Intake/Output Summary (Last 24 hours) at 2/14/2025 1051  Last data filed at 2/14/2025 0952  Gross per 24 hour   Intake 996 ml   Output 1030 ml   Net -34 ml       Physical Exam  Physical Exam  Constitutional:       General: She is not in acute distress.  HENT:      Head: Normocephalic.   Eyes:      Pupils: Pupils are equal, round, and reactive to light.   Cardiovascular:      Rate and Rhythm: Normal rate.      Comments: afib  Pulmonary:      Effort: Pulmonary effort is normal. No respiratory distress.   Abdominal:      General: Abdomen is flat.      Palpations: Abdomen is soft.   Musculoskeletal:         General: Swelling present. Normal range of motion.      Cervical back: Normal range of motion.      Comments: Generalized nonpitting edema   Skin:     General: Skin is warm and dry.      Capillary Refill: Capillary refill takes less than 2 seconds.   Neurological:      General: No focal deficit present.      Mental Status: She is alert and " oriented to person, place, and time. Mental status is at baseline.   Psychiatric:         Mood and Affect: Mood normal.         Medications  Scheduled medications  acetaminophen, 650 mg, oral, q6h  albumin human, 25 g, intravenous, Once  aspirin, 81 mg, oral, Daily  ceFAZolin, 2 g, intravenous, q8h  clopidogrel, 75 mg, oral, Daily  [START ON 2/15/2025] colchicine, 0.6 mg, oral, Daily  heparin (porcine), 5,000 Units, subcutaneous, q8h  insulin lispro, 0-15 Units, subcutaneous, Before meals & nightly  levothyroxine, 125 mcg, oral, Daily  magnesium sulfate, 2 g, intravenous, Once  methocarbamol, 500 mg, oral, q6h RENETTA  metoprolol tartrate, 12.5 mg, oral, BID  nitrofurantoin, 50 mg, oral, Every other day  oxygen, , inhalation, Continuous - 02/gases  polyethylene glycol, 17 g, oral, BID  potassium phosphate (monobasic), 500 mg, oral, 4x daily  rosuvastatin, 40 mg, oral, Daily  sennosides-docusate sodium, 2 tablet, oral, BID    Continuous medications  amiodarone, 1 mg/min   Followed by  amiodarone, 0.5 mg/min    PRN medications  PRN medications: metoclopramide, ondansetron **OR** [DISCONTINUED] ondansetron, oxyCODONE, oxyCODONE    Labs  Results for orders placed or performed during the hospital encounter of 02/12/25 (from the past 24 hours)   POCT GLUCOSE   Result Value Ref Range    POCT Glucose 128 (H) 74 - 99 mg/dL   POCT GLUCOSE   Result Value Ref Range    POCT Glucose 133 (H) 74 - 99 mg/dL   POCT GLUCOSE   Result Value Ref Range    POCT Glucose 147 (H) 74 - 99 mg/dL   Magnesium   Result Value Ref Range    Magnesium 2.15 1.60 - 2.40 mg/dL   CBC   Result Value Ref Range    WBC 14.7 (H) 4.4 - 11.3 x10*3/uL    nRBC 0.0 0.0 - 0.0 /100 WBCs    RBC 3.91 (L) 4.00 - 5.20 x10*6/uL    Hemoglobin 11.7 (L) 12.0 - 16.0 g/dL    Hematocrit 36.1 36.0 - 46.0 %    MCV 92 80 - 100 fL    MCH 29.9 26.0 - 34.0 pg    MCHC 32.4 32.0 - 36.0 g/dL    RDW 14.8 (H) 11.5 - 14.5 %    Platelets 201 150 - 450 x10*3/uL   Renal Function Panel   Result  Value Ref Range    Glucose 110 (H) 74 - 99 mg/dL    Sodium 139 136 - 145 mmol/L    Potassium 3.7 3.5 - 5.3 mmol/L    Chloride 106 98 - 107 mmol/L    Bicarbonate 24 21 - 32 mmol/L    Anion Gap 13 10 - 20 mmol/L    Urea Nitrogen 14 6 - 23 mg/dL    Creatinine 0.80 0.50 - 1.05 mg/dL    eGFR 76 >60 mL/min/1.73m*2    Calcium 9.4 8.6 - 10.6 mg/dL    Phosphorus 1.6 (L) 2.5 - 4.9 mg/dL    Albumin 3.7 3.4 - 5.0 g/dL   POCT GLUCOSE   Result Value Ref Range    POCT Glucose 114 (H) 74 - 99 mg/dL               IMAGING/ DIAGNOSTIC TESTING:  I have personally reviewed the following test result(s):    2/14 CXR  IMPRESSION:  1.  Stable postoperative atelectasis/effusions without pneumothorax.      IMPRESSION & PLAN:  POD # 2 s/p Robotic MidCAB x 1. [place] to LAD  - Increase activity/ ambulation; PT/OT  - Encourage IS, C/DB; respiratory therapy; wean O2 as neftaly   - Cardiac rehab referral   - Continue cardiac meds: ASA, BB, statin   - continue plavix x1 month for off-pump CABG   - Pain and anticonstipation meds  - Maintain chest tubes until meet criteria to remove; monitor daily 1v PCXR while chest tubes in place   - 2v CXR ordered 2/14  - Postop echo not needed for isolated cabg  - Cut epicardial wires prior to discharge-no wires  - Tele until discharge  - Optimize nutrition and electrolytes    Rhythm  - Tele: Afib 100's  - Continue BB, amio bolus and gtt, mag  - Adjust medications as tolerated    Acute Blood Loss Anemia   Recent Labs     02/14/25  0657 02/13/25  0218 02/12/25  1436 02/03/25  1357 01/10/25  0844 03/15/24  0737 02/09/24  1506   HGB 11.7* 11.6* 11.6* 13.8 14.4 14.5 14.5   HCT 36.1 33.0* 33.6* 41.2 42.8 41.9 42.7   - MV, PO Iron x1mo  - Daily labs, transfuse as indicated    Thrombocytopenia  Recent Labs     02/14/25  0657 02/13/25  0218 02/12/25  1436 02/03/25  1357 01/10/25  0844 03/15/24  0737 02/09/24  1506    208 195 279 238 276 238   - Etiology likely postop/CPB related  - Continue to trend with daily  CBCs    Volume/Electrolyte Status: Preop wt Weight: 71.6 kg (157 lb 13.6 oz)   Vitals:    25 2336   Weight: 72.7 kg (160 lb 4.8 oz)   - Start diuresis this evening-20mg IV lasix x1  - Adjust diuresis as needed for postop cardiac surgery hypervolemia  - Replete electrolytes for hypokalemia/hypomagnesemia/hypophosphatemia as needed  - Daily weights and strict I&Os  - Daily RFP while admitted    Leukocytosis  Recent Labs     25  0657 25  0218 25  1436 25  1357 01/10/25  0844 03/15/24  0737 24  1506   WBC 14.7* 15.1* 21.5* 7.4 6.7 6.4 11.0     Temp (36hrs), Av.8 °C (100 °F), Min:36.6 °C (97.9 °F), Max:38.2 °C (100.8 °F)    - aggressive pulmonary hygiene  - monitor for s/s infection  - likely atelectasis/ postoperative in etiology  - daily CBC to follow      VTE Prophylaxis: SCDs/TEDs, ambulation, SQ heparin  Code Status: Full Code    Dispo  - PT/OT recs Low intensity level of continued care   - Would benefit from homecare RN for cardiac surgery carepath  - Anticipate discharge 2-3 days, pending afib control, diuresis, imaging  - Will continue to assess discharge needs      JAYLAN Stock-CNP  Cardiac Surgery [facility]  [facility]  Team Phone [phone]    2025  10:51 AM

## 2025-02-14 NOTE — SIGNIFICANT EVENT
Rapid Response Nurse Note: RADAR alert: 6    Pager time: [phone]  Arrival time: 1000  Event end time: 100  Location: LT 3060  [] Triage by phone or secure messaging    Rapid response initiated by:  [] Rapid response RN [] Family [] Nursing Supervisor [] Physician   [x] RADAR auto page [] Sepsis auto-page [] RN [] RT   [] NP/PA [] Other:     Primary reason for call:   [] BAT [] New CPAP/BiPAP [] Bleeding [] Change in mental status   [] Chest pain [] Code blue [] FiO2 >/= 50% [] HR </= 40 bpm   [] HR >/= 130 bpm [] Hyperglycemia [] Hypoglycemia [x] RADAR    [] RR </= 8 bpm [] RR >/= 30 bpm [] SBP </= 90 mmHg [] SpO2 < 90%   [] Seizure [] Sepsis [] Shortness of breath  [] Staff concern: see comments     Initial VS and/or RADAR VS: T 37.2 °C; ; RR 20; BP 90/56; SPO2 95%.    Providers present at bedside (if applicable):     Name of ICU Provider contacted (if applicable):     Interventions:  [x] None [] ABG/VBG [] Assist w/ICU transfer [] BAT paged    [] Bag mask [] Blood [] Cardioversion [] Code Blue   [] Code blue for intubation [] Code status changed [] Chest x-ray [] EKG   [] IV fluid/bolus [] KUB x-ray [] Labs/cultures [] Medication   [] Nebulizer treatment [] NIPPV (CPAP/BiPAP) [] Oxygen [] Oral airway   [] Peripheral IV [] Palliative care consult [] CT/MRI [] Sepsis protocol    [] Suctioned [] Other:     Outcome:  [] Coded and  [] Code blue for intubation [] Coded and transferred to ICU []  on division   [x] Remained on division (no change) [] Remained on division + additional monitoring [] Remained in ED [] Transferred to ED   [] Transferred to ICU [] Transferred to inpatient status [] Transferred for interventions (procedure) [] Transferred to ICU stepdown    [] Transferred to surgery [] Transferred to telemetry [] Sepsis protocol [] STEMI protocol   [] Stroke protocol [x] Bedside nurse instructed to page rapid response for any concerns or acute change in condition/VS     Additional Comments:      Radar auto-page received for a radar score of 6 with the above listed vital signs.  Vital signs were confirmed and reviewed with the primary RN.  She is at her current baseline and the primary RN has no present concerns.  There are no indications for interventions by Rapid Response at this time.  RN to contact Rapid Response with any future concerns or signs of clinical decompensation.

## 2025-02-15 ENCOUNTER — APPOINTMENT (OUTPATIENT)
Dept: RADIOLOGY | Facility: HOSPITAL | Age: 75
DRG: 236 | End: 2025-02-15
Payer: MEDICARE

## 2025-02-15 LAB
ALBUMIN SERPL BCP-MCNC: 3.6 G/DL (ref 3.4–5)
ANION GAP SERPL CALC-SCNC: 12 MMOL/L (ref 10–20)
BLOOD EXPIRATION DATE: NORMAL
BUN SERPL-MCNC: 11 MG/DL (ref 6–23)
CALCIUM SERPL-MCNC: 9 MG/DL (ref 8.6–10.6)
CHLORIDE SERPL-SCNC: 107 MMOL/L (ref 98–107)
CO2 SERPL-SCNC: 27 MMOL/L (ref 21–32)
CREAT SERPL-MCNC: 0.66 MG/DL (ref 0.5–1.05)
DISPENSE STATUS: NORMAL
EGFRCR SERPLBLD CKD-EPI 2021: >90 ML/MIN/1.73M*2
ERYTHROCYTE [DISTWIDTH] IN BLOOD BY AUTOMATED COUNT: 14.3 % (ref 11.5–14.5)
GLUCOSE BLD MANUAL STRIP-MCNC: 108 MG/DL (ref 74–99)
GLUCOSE BLD MANUAL STRIP-MCNC: 85 MG/DL (ref 74–99)
GLUCOSE SERPL-MCNC: 97 MG/DL (ref 74–99)
HCT VFR BLD AUTO: 34 % (ref 36–46)
HGB BLD-MCNC: 12.1 G/DL (ref 12–16)
MAGNESIUM SERPL-MCNC: 2.2 MG/DL (ref 1.6–2.4)
MCH RBC QN AUTO: 30.7 PG (ref 26–34)
MCHC RBC AUTO-ENTMCNC: 35.6 G/DL (ref 32–36)
MCV RBC AUTO: 86 FL (ref 80–100)
NRBC BLD-RTO: 0 /100 WBCS (ref 0–0)
PHOSPHATE SERPL-MCNC: 2 MG/DL (ref 2.5–4.9)
PLATELET # BLD AUTO: 196 X10*3/UL (ref 150–450)
POTASSIUM SERPL-SCNC: 3.5 MMOL/L (ref 3.5–5.3)
PRODUCT BLOOD TYPE: 7300
PRODUCT CODE: NORMAL
RBC # BLD AUTO: 3.94 X10*6/UL (ref 4–5.2)
SODIUM SERPL-SCNC: 142 MMOL/L (ref 136–145)
UNIT ABO: NORMAL
UNIT NUMBER: NORMAL
UNIT RH: NORMAL
UNIT VOLUME: 350
WBC # BLD AUTO: 9.7 X10*3/UL (ref 4.4–11.3)
XM INTEP: NORMAL

## 2025-02-15 PROCEDURE — 2500000005 HC RX 250 GENERAL PHARMACY W/O HCPCS: Performed by: NURSE PRACTITIONER

## 2025-02-15 PROCEDURE — 2500000001 HC RX 250 WO HCPCS SELF ADMINISTERED DRUGS (ALT 637 FOR MEDICARE OP): Performed by: NURSE PRACTITIONER

## 2025-02-15 PROCEDURE — 2500000002 HC RX 250 W HCPCS SELF ADMINISTERED DRUGS (ALT 637 FOR MEDICARE OP, ALT 636 FOR OP/ED): Performed by: NURSE PRACTITIONER

## 2025-02-15 PROCEDURE — 71046 X-RAY EXAM CHEST 2 VIEWS: CPT | Performed by: RADIOLOGY

## 2025-02-15 PROCEDURE — 71046 X-RAY EXAM CHEST 2 VIEWS: CPT

## 2025-02-15 PROCEDURE — 2500000004 HC RX 250 GENERAL PHARMACY W/ HCPCS (ALT 636 FOR OP/ED): Performed by: NURSE PRACTITIONER

## 2025-02-15 PROCEDURE — 84132 ASSAY OF SERUM POTASSIUM: CPT | Performed by: NURSE PRACTITIONER

## 2025-02-15 PROCEDURE — 82947 ASSAY GLUCOSE BLOOD QUANT: CPT

## 2025-02-15 PROCEDURE — 85027 COMPLETE CBC AUTOMATED: CPT | Performed by: NURSE PRACTITIONER

## 2025-02-15 PROCEDURE — 2500000002 HC RX 250 W HCPCS SELF ADMINISTERED DRUGS (ALT 637 FOR MEDICARE OP, ALT 636 FOR OP/ED)

## 2025-02-15 PROCEDURE — 1200000002 HC GENERAL ROOM WITH TELEMETRY DAILY

## 2025-02-15 PROCEDURE — 36415 COLL VENOUS BLD VENIPUNCTURE: CPT | Performed by: NURSE PRACTITIONER

## 2025-02-15 PROCEDURE — 83735 ASSAY OF MAGNESIUM: CPT | Performed by: NURSE PRACTITIONER

## 2025-02-15 PROCEDURE — 2500000001 HC RX 250 WO HCPCS SELF ADMINISTERED DRUGS (ALT 637 FOR MEDICARE OP)

## 2025-02-15 RX ORDER — CARVEDILOL 3.12 MG/1
3.12 TABLET ORAL 2 TIMES DAILY
Status: DISCONTINUED | OUTPATIENT
Start: 2025-02-15 | End: 2025-02-16

## 2025-02-15 RX ORDER — AMIODARONE HYDROCHLORIDE 200 MG/1
400 TABLET ORAL 2 TIMES DAILY
Status: DISCONTINUED | OUTPATIENT
Start: 2025-02-15 | End: 2025-02-17

## 2025-02-15 RX ORDER — COLCHICINE 0.6 MG/1
0.3 TABLET ORAL DAILY
Status: DISCONTINUED | OUTPATIENT
Start: 2025-02-15 | End: 2025-02-17 | Stop reason: HOSPADM

## 2025-02-15 RX ADMIN — AMIODARONE HYDROCHLORIDE 0.5 MG/MIN: 1.8 INJECTION, SOLUTION INTRAVENOUS at 14:34

## 2025-02-15 RX ADMIN — CLOPIDOGREL BISULFATE 75 MG: 75 TABLET ORAL at 09:18

## 2025-02-15 RX ADMIN — Medication 21 PERCENT: at 09:22

## 2025-02-15 RX ADMIN — METHOCARBAMOL 500 MG: 500 TABLET ORAL at 05:44

## 2025-02-15 RX ADMIN — METHOCARBAMOL 500 MG: 500 TABLET ORAL at 17:13

## 2025-02-15 RX ADMIN — HEPARIN SODIUM 5000 UNITS: 5000 INJECTION, SOLUTION INTRAVENOUS; SUBCUTANEOUS at 02:19

## 2025-02-15 RX ADMIN — AMIODARONE HYDROCHLORIDE 400 MG: 200 TABLET ORAL at 21:17

## 2025-02-15 RX ADMIN — ASPIRIN 81 MG CHEWABLE TABLET 81 MG: 81 TABLET CHEWABLE at 09:18

## 2025-02-15 RX ADMIN — METHOCARBAMOL 500 MG: 500 TABLET ORAL at 12:12

## 2025-02-15 RX ADMIN — CARVEDILOL 3.12 MG: 3.12 TABLET, FILM COATED ORAL at 09:18

## 2025-02-15 RX ADMIN — CARVEDILOL 3.12 MG: 3.12 TABLET, FILM COATED ORAL at 21:17

## 2025-02-15 RX ADMIN — ROSUVASTATIN CALCIUM 40 MG: 20 TABLET, FILM COATED ORAL at 09:18

## 2025-02-15 RX ADMIN — HEPARIN SODIUM 5000 UNITS: 5000 INJECTION, SOLUTION INTRAVENOUS; SUBCUTANEOUS at 09:18

## 2025-02-15 RX ADMIN — METHOCARBAMOL 500 MG: 500 TABLET ORAL at 23:43

## 2025-02-15 RX ADMIN — ACETAMINOPHEN 650 MG: 325 TABLET ORAL at 12:12

## 2025-02-15 RX ADMIN — ACETAMINOPHEN 650 MG: 325 TABLET ORAL at 23:44

## 2025-02-15 RX ADMIN — HEPARIN SODIUM 5000 UNITS: 5000 INJECTION, SOLUTION INTRAVENOUS; SUBCUTANEOUS at 17:15

## 2025-02-15 RX ADMIN — LEVOTHYROXINE SODIUM 125 MCG: 0.12 TABLET ORAL at 05:45

## 2025-02-15 RX ADMIN — POTASSIUM PHOSPHATE, MONOBASIC 500 MG: 500 TABLET, SOLUBLE ORAL at 09:18

## 2025-02-15 RX ADMIN — AMIODARONE HYDROCHLORIDE 0.5 MG/MIN: 1.8 INJECTION, SOLUTION INTRAVENOUS at 02:25

## 2025-02-15 RX ADMIN — ACETAMINOPHEN 650 MG: 325 TABLET ORAL at 17:13

## 2025-02-15 RX ADMIN — COLCHICINE 0.3 MG: 0.6 TABLET, FILM COATED ORAL at 09:18

## 2025-02-15 RX ADMIN — ACETAMINOPHEN 650 MG: 325 TABLET ORAL at 05:45

## 2025-02-15 ASSESSMENT — PAIN - FUNCTIONAL ASSESSMENT
PAIN_FUNCTIONAL_ASSESSMENT: 0-10

## 2025-02-15 ASSESSMENT — PAIN SCALES - GENERAL
PAINLEVEL_OUTOF10: 3
PAINLEVEL_OUTOF10: 3
PAINLEVEL_OUTOF10: 4
PAINLEVEL_OUTOF10: 3

## 2025-02-15 ASSESSMENT — COGNITIVE AND FUNCTIONAL STATUS - GENERAL
CLIMB 3 TO 5 STEPS WITH RAILING: A LITTLE
MOBILITY SCORE: 23
DAILY ACTIVITIY SCORE: 24

## 2025-02-15 NOTE — DISCHARGE INSTRUCTIONS
Additional Post-Operative Instructions:  - Remember to use your Incentive spirometer 10x/hr while awake. Remember to cough and deep breath.  - No NSAIDs (common over-the-counter NSAIDs are ibuprofen/Motrin/Advil, naproxen/Naprosyn/Aleve) for 3 months after cardiac surgery; if NSAIDs needed after 3 months, clear use with cardiologist before starting.       Your home medications may have changed after surgery. Carefully compare this list with your prescription bottles at home and set aside any medications you are told to not take so you do not confuse them. Do not dispose of any medications until your follow-up, since your doctors may restart some at your follow-up appointments. It is important to bring a complete, current list of your medications to any medical appointments or hospitalizations.    For any questions about your discharge, please call the cardiac surgery office at [phone]

## 2025-02-15 NOTE — PROGRESS NOTES
"CARDIAC SURGERY DAILY PROGRESS NOTE    Tracy Santoyo is a 74 y.o. female, with a PMH of CAD, PVC, HLD, Hypothyroidism, trigeminal neuralgia, who presented to [facility] on 2/12/2025 for CABG eval.       OPERATION/PROCEDURE: 2/14 with Edgar Schmitt MD  CABG X1, ROBOT-ASSISTED, LIMA TO LAD MID CAB  [address] - [place] CABG W/ARTERIAL GRAFT SINGLE ARTERIAL GRAFT     1.  Robotic assisted mini invasive off-pump CABG with pedicle LIMA to LAD    CTICU Course: uneventful  Transferred to T3 on 2/13      Interval History:   No acute events overnight. Converted to NSR at 1000.     SUBJECTIVE:  Denies SOB, chest pain or abdominal pain. Last BM yesterday - diarrhea     Objective   /72   Pulse 72   Temp 36.9 °C (98.4 °F)   Resp 17   Ht 1.676 m (5' 5.98\")   Wt 71.5 kg (157 lb 9.6 oz)   LMP  (LMP Unknown)   SpO2 95%   BMI 25.45 kg/m²   0-10 (Numeric) Pain Score: 3   3 Day Weight Change: Unable to Calculate    Intake and Output    Intake/Output Summary (Last 24 hours) at 2/15/2025 1456  Last data filed at 2/15/2025 1449  Gross per 24 hour   Intake 1550.02 ml   Output 2790 ml   Net -1239.98 ml       Physical Exam  Physical Exam  Constitutional:       General: She is not in acute distress.  HENT:      Head: Normocephalic.   Eyes:      Pupils: Pupils are equal, round, and reactive to light.   Cardiovascular:      Rate and Rhythm: Normal rate and regular rhythm.      Pulses: Normal pulses.      Heart sounds: Normal heart sounds.      Comments: Tele: Converted to NSR at 1000 on 2/15 - 70-80s bpm  No wires  Pulmonary:      Effort: Pulmonary effort is normal. No respiratory distress.      Breath sounds: Normal breath sounds.   Abdominal:      General: Abdomen is flat. Bowel sounds are normal.      Palpations: Abdomen is soft.      Comments: Last BM 2/14   Musculoskeletal:         General: Swelling present. Normal range of motion.      Cervical back: Normal range of motion.      Comments: Generalized nonpitting " edema   Skin:     General: Skin is warm and dry.      Capillary Refill: Capillary refill takes less than 2 seconds.      Comments: Incision C/D/I   Neurological:      General: No focal deficit present.      Mental Status: She is alert and oriented to person, place, and time. Mental status is at baseline.   Psychiatric:         Mood and Affect: Mood normal.         Medications  Scheduled medications  acetaminophen, 650 mg, oral, q6h  aspirin, 81 mg, oral, Daily  carvedilol, 3.125 mg, oral, BID  clopidogrel, 75 mg, oral, Daily  colchicine, 0.3 mg, oral, Daily  heparin (porcine), 5,000 Units, subcutaneous, q8h  levothyroxine, 125 mcg, oral, Daily  methocarbamol, 500 mg, oral, q6h RENETTA  nitrofurantoin, 50 mg, oral, Every other day  oxygen, , inhalation, Continuous - 02/gases  polyethylene glycol, 17 g, oral, BID  rosuvastatin, 40 mg, oral, Daily  sennosides-docusate sodium, 2 tablet, oral, BID    Continuous medications  amiodarone, 0.5 mg/min, Last Rate: 0.5 mg/min (02/15/25 1434)    PRN medications  PRN medications: metoclopramide, ondansetron **OR** [DISCONTINUED] ondansetron, oxyCODONE, oxyCODONE    Labs  Results for orders placed or performed during the hospital encounter of 02/12/25 (from the past 24 hours)   POCT GLUCOSE   Result Value Ref Range    POCT Glucose 94 74 - 99 mg/dL   POCT GLUCOSE   Result Value Ref Range    POCT Glucose 107 (H) 74 - 99 mg/dL   Magnesium   Result Value Ref Range    Magnesium 2.20 1.60 - 2.40 mg/dL   CBC   Result Value Ref Range    WBC 9.7 4.4 - 11.3 x10*3/uL    nRBC 0.0 0.0 - 0.0 /100 WBCs    RBC 3.94 (L) 4.00 - 5.20 x10*6/uL    Hemoglobin 12.1 12.0 - 16.0 g/dL    Hematocrit 34.0 (L) 36.0 - 46.0 %    MCV 86 80 - 100 fL    MCH 30.7 26.0 - 34.0 pg    MCHC 35.6 32.0 - 36.0 g/dL    RDW 14.3 11.5 - 14.5 %    Platelets 196 150 - 450 x10*3/uL   Renal Function Panel   Result Value Ref Range    Glucose 97 74 - 99 mg/dL    Sodium 142 136 - 145 mmol/L    Potassium 3.5 3.5 - 5.3 mmol/L    Chloride  107 98 - 107 mmol/L    Bicarbonate 27 21 - 32 mmol/L    Anion Gap 12 10 - 20 mmol/L    Urea Nitrogen 11 6 - 23 mg/dL    Creatinine 0.66 0.50 - 1.05 mg/dL    eGFR >90 >60 mL/min/1.73m*2    Calcium 9.0 8.6 - 10.6 mg/dL    Phosphorus 2.0 (L) 2.5 - 4.9 mg/dL    Albumin 3.6 3.4 - 5.0 g/dL   POCT GLUCOSE   Result Value Ref Range    POCT Glucose 108 (H) 74 - 99 mg/dL   POCT GLUCOSE   Result Value Ref Range    POCT Glucose 85 74 - 99 mg/dL               IMAGING/ DIAGNOSTIC TESTING:  I have personally reviewed the following test result(s):    2/14 CXR  IMPRESSION:  1.  Stable postoperative atelectasis/effusions without pneumothorax.      IMPRESSION & PLAN:  POD # 2 s/p Robotic MidCAB x 1. [place] to LAD  - Increase activity/ ambulation; PT/OT  - Encourage IS, C/DB; respiratory therapy; wean O2 as neftaly   - Cardiac rehab referral   - Continue cardiac meds: ASA, BB, statin   - continue plavix x1 month for off-pump CABG   - Pain and anticonstipation meds  - Maintain chest tubes until meet criteria to remove; monitor daily 1v PCXR while chest tubes in place   - 2v CXR ordered 2/14  - Postop echo not needed for isolated cabg  - NO wires  - Tele until discharge  - Optimize nutrition and electrolytes    Rhythm  - Tele: Afib 100's, converted to NSR on 2/15 at 1000  - Continue BB, amio bolus and gtt, mag  - Adjust medications as tolerated    Acute Blood Loss Anemia   Recent Labs     02/15/25  0733 02/14/25  0657 02/13/25  0218 02/12/25  1436 02/03/25  1357 01/10/25  0844 03/15/24  0737   HGB 12.1 11.7* 11.6* 11.6* 13.8 14.4 14.5   HCT 34.0* 36.1 33.0* 33.6* 41.2 42.8 41.9   - MV, PO Iron x1mo  - Daily labs, transfuse as indicated    Thrombocytopenia  Recent Labs     02/15/25  0733 02/14/25  0657 02/13/25  0218 02/12/25  1436 02/03/25  1357 01/10/25  0844 03/15/24  0737    201 208 195 279 238 276   - Etiology likely postop/CPB related  - Continue to trend with daily CBCs    Volume/Electrolyte Status: Preop wt Weight: 71.6 kg (157 lb  13.6 oz)   Vitals:    02/15/25 0552   Weight: 71.5 kg (157 lb 9.6 oz)   - Start diuresis this evening-20mg IV lasix x1  - Adjust diuresis as needed for postop cardiac surgery hypervolemia  - Replete electrolytes for hypokalemia/hypomagnesemia/hypophosphatemia as needed  - Daily weights and strict I&Os  - Daily RFP while admitted    Leukocytosis  Recent Labs     02/15/25  0733 25  0657 25  0218 25  1436 25  1357 01/10/25  0844 03/15/24  0737   WBC 9.7 14.7* 15.1* 21.5* 7.4 6.7 6.4     Temp (36hrs), Av.7 °C (98 °F), Min:36 °C (96.8 °F), Max:37.2 °C (99 °F)    - aggressive pulmonary hygiene  - monitor for s/s infection  - likely atelectasis/ postoperative in etiology  - daily CBC to follow      VTE Prophylaxis: SCDs/TEDs, ambulation, SQ heparin  Code Status: Full Code    Dispo  - PT/OT recs Low intensity  - Would benefit from homecare RN for cardiac surgery carepath  - Anticipate discharge 1-2 days, pending rhythm management, diuresis, imaging (awaiting final read of 2 v CXR). Pt prefers discharge on Monday r/t transportation from family  - Will continue to assess discharge needs      JAYLAN Stanton-CNP  Cardiac Surgery [facility]  [facility]  Team Phone [phone]    2/15/2025  2:56 PM

## 2025-02-15 NOTE — CARE PLAN
The patient's goals for the shift include      The clinical goals for the shift include Pt will remain HDS this shift      Problem: Skin  Goal: Decreased wound size/increased tissue granulation at next dressing change  Outcome: Progressing  Goal: Participates in plan/prevention/treatment measures  Outcome: Progressing  Goal: Prevent/manage excess moisture  Outcome: Progressing  Goal: Prevent/minimize sheer/friction injuries  Outcome: Progressing  Goal: Promote/optimize nutrition  Outcome: Progressing  Goal: Promote skin healing  Outcome: Progressing     Problem: Pain  Goal: Takes deep breaths with improved pain control throughout the shift  Outcome: Progressing  Goal: Turns in bed with improved pain control throughout the shift  Outcome: Progressing  Goal: Walks with improved pain control throughout the shift  Outcome: Progressing  Goal: Performs ADL's with improved pain control throughout shift  Outcome: Progressing  Goal: Participates in PT with improved pain control throughout the shift  Outcome: Progressing  Goal: Free from opioid side effects throughout the shift  Outcome: Progressing  Goal: Free from acute confusion related to pain meds throughout the shift  Outcome: Progressing     Problem: Pain - Adult  Goal: Verbalizes/displays adequate comfort level or baseline comfort level  Outcome: Progressing     Problem: Safety - Adult  Goal: Free from fall injury  Outcome: Progressing     Problem: Discharge Planning  Goal: Discharge to home or other facility with appropriate resources  Outcome: Progressing     Problem: Chronic Conditions and Co-morbidities  Goal: Patient's chronic conditions and co-morbidity symptoms are monitored and maintained or improved  Outcome: Progressing     Problem: Nutrition  Goal: Nutrient intake appropriate for maintaining nutritional needs  Outcome: Progressing

## 2025-02-15 NOTE — CARE PLAN
The clinical goals for the shift include Pt will remain HDS and safe from falls for the remainder of the shift.

## 2025-02-16 ENCOUNTER — HOME HEALTH ADMISSION (OUTPATIENT)
Dept: HOME HEALTH SERVICES | Facility: HOME HEALTH | Age: 75
End: 2025-02-16
Payer: MEDICARE

## 2025-02-16 ENCOUNTER — APPOINTMENT (OUTPATIENT)
Dept: CARDIOLOGY | Facility: HOSPITAL | Age: 75
DRG: 236 | End: 2025-02-16
Payer: MEDICARE

## 2025-02-16 VITALS
WEIGHT: 158.8 LBS | HEART RATE: 76 BPM | RESPIRATION RATE: 18 BRPM | BODY MASS INDEX: 25.52 KG/M2 | SYSTOLIC BLOOD PRESSURE: 120 MMHG | HEIGHT: 66 IN | TEMPERATURE: 98.1 F | DIASTOLIC BLOOD PRESSURE: 68 MMHG | OXYGEN SATURATION: 96 %

## 2025-02-16 LAB
ALBUMIN SERPL BCP-MCNC: 3.4 G/DL (ref 3.4–5)
ANION GAP SERPL CALC-SCNC: 14 MMOL/L (ref 10–20)
BUN SERPL-MCNC: 12 MG/DL (ref 6–23)
CALCIUM SERPL-MCNC: 8.9 MG/DL (ref 8.6–10.6)
CHLORIDE SERPL-SCNC: 105 MMOL/L (ref 98–107)
CO2 SERPL-SCNC: 25 MMOL/L (ref 21–32)
CREAT SERPL-MCNC: 0.58 MG/DL (ref 0.5–1.05)
EGFRCR SERPLBLD CKD-EPI 2021: >90 ML/MIN/1.73M*2
ERYTHROCYTE [DISTWIDTH] IN BLOOD BY AUTOMATED COUNT: 14.2 % (ref 11.5–14.5)
GLUCOSE SERPL-MCNC: 87 MG/DL (ref 74–99)
HCT VFR BLD AUTO: 31.7 % (ref 36–46)
HGB BLD-MCNC: 11.3 G/DL (ref 12–16)
MAGNESIUM SERPL-MCNC: 2.15 MG/DL (ref 1.6–2.4)
MCH RBC QN AUTO: 30.2 PG (ref 26–34)
MCHC RBC AUTO-ENTMCNC: 35.6 G/DL (ref 32–36)
MCV RBC AUTO: 85 FL (ref 80–100)
NRBC BLD-RTO: 0 /100 WBCS (ref 0–0)
PHOSPHATE SERPL-MCNC: 2.6 MG/DL (ref 2.5–4.9)
PLATELET # BLD AUTO: 198 X10*3/UL (ref 150–450)
POTASSIUM SERPL-SCNC: 3.5 MMOL/L (ref 3.5–5.3)
RBC # BLD AUTO: 3.74 X10*6/UL (ref 4–5.2)
SODIUM SERPL-SCNC: 140 MMOL/L (ref 136–145)
WBC # BLD AUTO: 7.9 X10*3/UL (ref 4.4–11.3)

## 2025-02-16 PROCEDURE — 2500000001 HC RX 250 WO HCPCS SELF ADMINISTERED DRUGS (ALT 637 FOR MEDICARE OP): Performed by: NURSE PRACTITIONER

## 2025-02-16 PROCEDURE — 2500000004 HC RX 250 GENERAL PHARMACY W/ HCPCS (ALT 636 FOR OP/ED)

## 2025-02-16 PROCEDURE — 2500000002 HC RX 250 W HCPCS SELF ADMINISTERED DRUGS (ALT 637 FOR MEDICARE OP, ALT 636 FOR OP/ED): Performed by: NURSE PRACTITIONER

## 2025-02-16 PROCEDURE — 2500000004 HC RX 250 GENERAL PHARMACY W/ HCPCS (ALT 636 FOR OP/ED): Performed by: NURSE PRACTITIONER

## 2025-02-16 PROCEDURE — 99232 SBSQ HOSP IP/OBS MODERATE 35: CPT | Performed by: NURSE PRACTITIONER

## 2025-02-16 PROCEDURE — 93010 ELECTROCARDIOGRAM REPORT: CPT | Performed by: INTERNAL MEDICINE

## 2025-02-16 PROCEDURE — 2500000001 HC RX 250 WO HCPCS SELF ADMINISTERED DRUGS (ALT 637 FOR MEDICARE OP)

## 2025-02-16 PROCEDURE — 1200000002 HC GENERAL ROOM WITH TELEMETRY DAILY

## 2025-02-16 PROCEDURE — 83735 ASSAY OF MAGNESIUM: CPT | Performed by: NURSE PRACTITIONER

## 2025-02-16 PROCEDURE — 80069 RENAL FUNCTION PANEL: CPT | Performed by: NURSE PRACTITIONER

## 2025-02-16 PROCEDURE — 93005 ELECTROCARDIOGRAM TRACING: CPT

## 2025-02-16 PROCEDURE — 36415 COLL VENOUS BLD VENIPUNCTURE: CPT | Performed by: NURSE PRACTITIONER

## 2025-02-16 PROCEDURE — 85027 COMPLETE CBC AUTOMATED: CPT | Performed by: NURSE PRACTITIONER

## 2025-02-16 PROCEDURE — 2500000002 HC RX 250 W HCPCS SELF ADMINISTERED DRUGS (ALT 637 FOR MEDICARE OP, ALT 636 FOR OP/ED)

## 2025-02-16 RX ORDER — POTASSIUM CHLORIDE 20 MEQ/1
20 TABLET, EXTENDED RELEASE ORAL ONCE
Status: COMPLETED | OUTPATIENT
Start: 2025-02-16 | End: 2025-02-16

## 2025-02-16 RX ORDER — CARVEDILOL 3.12 MG/1
6.25 TABLET ORAL 2 TIMES DAILY
Status: DISCONTINUED | OUTPATIENT
Start: 2025-02-16 | End: 2025-02-17 | Stop reason: HOSPADM

## 2025-02-16 RX ORDER — CARVEDILOL 3.12 MG/1
3.12 TABLET ORAL ONCE
Status: COMPLETED | OUTPATIENT
Start: 2025-02-16 | End: 2025-02-16

## 2025-02-16 RX ADMIN — ONDANSETRON HYDROCHLORIDE 4 MG: 4 TABLET, FILM COATED ORAL at 09:45

## 2025-02-16 RX ADMIN — HEPARIN SODIUM 5000 UNITS: 5000 INJECTION, SOLUTION INTRAVENOUS; SUBCUTANEOUS at 17:57

## 2025-02-16 RX ADMIN — HEPARIN SODIUM 5000 UNITS: 5000 INJECTION, SOLUTION INTRAVENOUS; SUBCUTANEOUS at 09:40

## 2025-02-16 RX ADMIN — LEVOTHYROXINE SODIUM 125 MCG: 0.12 TABLET ORAL at 05:03

## 2025-02-16 RX ADMIN — METHOCARBAMOL 500 MG: 500 TABLET ORAL at 05:03

## 2025-02-16 RX ADMIN — ROSUVASTATIN CALCIUM 40 MG: 20 TABLET, FILM COATED ORAL at 08:03

## 2025-02-16 RX ADMIN — NITROFURANTOIN 50 MG: 25 SUSPENSION ORAL at 08:05

## 2025-02-16 RX ADMIN — COLCHICINE 0.3 MG: 0.6 TABLET, FILM COATED ORAL at 08:03

## 2025-02-16 RX ADMIN — CARVEDILOL 6.25 MG: 3.12 TABLET, FILM COATED ORAL at 20:38

## 2025-02-16 RX ADMIN — CARVEDILOL 3.12 MG: 3.12 TABLET, FILM COATED ORAL at 09:39

## 2025-02-16 RX ADMIN — ASPIRIN 81 MG CHEWABLE TABLET 81 MG: 81 TABLET CHEWABLE at 08:02

## 2025-02-16 RX ADMIN — ACETAMINOPHEN 650 MG: 325 TABLET ORAL at 05:03

## 2025-02-16 RX ADMIN — POTASSIUM CHLORIDE 20 MEQ: 1500 TABLET, EXTENDED RELEASE ORAL at 20:38

## 2025-02-16 RX ADMIN — ACETAMINOPHEN 650 MG: 325 TABLET ORAL at 17:57

## 2025-02-16 RX ADMIN — HEPARIN SODIUM 5000 UNITS: 5000 INJECTION, SOLUTION INTRAVENOUS; SUBCUTANEOUS at 01:15

## 2025-02-16 RX ADMIN — AMIODARONE HYDROCHLORIDE 400 MG: 200 TABLET ORAL at 08:03

## 2025-02-16 RX ADMIN — AMIODARONE HYDROCHLORIDE 0.5 MG/MIN: 1.8 INJECTION, SOLUTION INTRAVENOUS at 01:15

## 2025-02-16 RX ADMIN — CARVEDILOL 3.12 MG: 3.12 TABLET, FILM COATED ORAL at 08:03

## 2025-02-16 RX ADMIN — ACETAMINOPHEN 650 MG: 325 TABLET ORAL at 11:44

## 2025-02-16 RX ADMIN — CLOPIDOGREL BISULFATE 75 MG: 75 TABLET ORAL at 08:03

## 2025-02-16 RX ADMIN — AMIODARONE HYDROCHLORIDE 400 MG: 200 TABLET ORAL at 20:38

## 2025-02-16 ASSESSMENT — COGNITIVE AND FUNCTIONAL STATUS - GENERAL
DAILY ACTIVITIY SCORE: 24
MOBILITY SCORE: 23
CLIMB 3 TO 5 STEPS WITH RAILING: A LITTLE

## 2025-02-16 ASSESSMENT — PAIN SCALES - GENERAL
PAINLEVEL_OUTOF10: 0 - NO PAIN
PAINLEVEL_OUTOF10: 4
PAINLEVEL_OUTOF10: 4

## 2025-02-16 ASSESSMENT — PAIN - FUNCTIONAL ASSESSMENT
PAIN_FUNCTIONAL_ASSESSMENT: 0-10
PAIN_FUNCTIONAL_ASSESSMENT: 0-10

## 2025-02-16 NOTE — PROGRESS NOTES
"CARDIAC SURGERY DAILY PROGRESS NOTE    Tracy Santoyo is a 74 y.o. female, with a PMH of CAD, PVC, HLD, Hypothyroidism, trigeminal neuralgia, who presented to [facility] on 2/12/2025 for CABG eval.       OPERATION/PROCEDURE: 2/14 with Edgar Schmitt MD  CABG X1, ROBOT-ASSISTED, LIMA TO LAD MID CAB  00668 - MO CABG W/ARTERIAL GRAFT SINGLE ARTERIAL GRAFT     1.  Robotic assisted mini invasive off-pump CABG with pedicle LIMA to LAD    CTICU Course: uneventful  Transferred to T3 on 2/13      Interval History:    Tele review NSR  Good BP support increase coreg dose to 6.25 bid   Amio drip discontinue  12 lead EKG  NSR     SUBJECTIVE:  Denies SOB, chest pain or abdominal pain.   Wondering if her rate is regular      Objective   /81   Pulse 70   Temp 36.8 °C (98.2 °F)   Resp 17   Ht 1.676 m (5' 5.98\")   Wt 72 kg (158 lb 12.8 oz)   LMP  (LMP Unknown)   SpO2 97%   BMI 25.64 kg/m²   0-10 (Numeric) Pain Score: 0 - No pain   3 Day Weight Change: 0.144 kg (5.1 oz) per day    Intake and Output    Intake/Output Summary (Last 24 hours) at 2/16/2025 1248  Last data filed at 2/16/2025 1223  Gross per 24 hour   Intake 1373.04 ml   Output 1360 ml   Net 13.04 ml       Physical Exam  Physical Exam  Constitutional:       General: She is not in acute distress.  HENT:      Head: Normocephalic.   Eyes:      Pupils: Pupils are equal, round, and reactive to light.   Cardiovascular:      Rate and Rhythm: Normal rate and regular rhythm.      Pulses: Normal pulses.      Heart sounds: Normal heart sounds.      Comments: Tele: NSR   No wires  Pulmonary:      Effort: Pulmonary effort is normal. No respiratory distress.      Breath sounds: Normal breath sounds.   Abdominal:      General: Abdomen is flat. Bowel sounds are normal.      Palpations: Abdomen is soft.      Comments: Last BM 2/16   Musculoskeletal:         General: Swelling present. Normal range of motion.      Cervical back: Normal range of motion.      " Comments: Generalized nonpitting edema   Skin:     General: Skin is warm and dry.      Capillary Refill: Capillary refill takes less than 2 seconds.      Comments: Incision C/D/I   Neurological:      General: No focal deficit present.      Mental Status: She is alert and oriented to person, place, and time. Mental status is at baseline.   Psychiatric:         Mood and Affect: Mood normal.         Medications  Scheduled medications  acetaminophen, 650 mg, oral, q6h  amiodarone, 400 mg, oral, BID  aspirin, 81 mg, oral, Daily  carvedilol, 6.25 mg, oral, BID  clopidogrel, 75 mg, oral, Daily  colchicine, 0.3 mg, oral, Daily  heparin (porcine), 5,000 Units, subcutaneous, q8h  levothyroxine, 125 mcg, oral, Daily  nitrofurantoin, 50 mg, oral, Every other day  oxygen, , inhalation, Continuous - 02/gases  polyethylene glycol, 17 g, oral, BID  rosuvastatin, 40 mg, oral, Daily  sennosides-docusate sodium, 2 tablet, oral, BID    Continuous medications     PRN medications  PRN medications: metoclopramide, ondansetron **OR** [DISCONTINUED] ondansetron, oxyCODONE, oxyCODONE    Labs  Results for orders placed or performed during the hospital encounter of 02/12/25 (from the past 24 hours)   Magnesium   Result Value Ref Range    Magnesium 2.15 1.60 - 2.40 mg/dL   CBC   Result Value Ref Range    WBC 7.9 4.4 - 11.3 x10*3/uL    nRBC 0.0 0.0 - 0.0 /100 WBCs    RBC 3.74 (L) 4.00 - 5.20 x10*6/uL    Hemoglobin 11.3 (L) 12.0 - 16.0 g/dL    Hematocrit 31.7 (L) 36.0 - 46.0 %    MCV 85 80 - 100 fL    MCH 30.2 26.0 - 34.0 pg    MCHC 35.6 32.0 - 36.0 g/dL    RDW 14.2 11.5 - 14.5 %    Platelets 198 150 - 450 x10*3/uL   Renal Function Panel   Result Value Ref Range    Glucose 87 74 - 99 mg/dL    Sodium 140 136 - 145 mmol/L    Potassium 3.5 3.5 - 5.3 mmol/L    Chloride 105 98 - 107 mmol/L    Bicarbonate 25 21 - 32 mmol/L    Anion Gap 14 10 - 20 mmol/L    Urea Nitrogen 12 6 - 23 mg/dL    Creatinine 0.58 0.50 - 1.05 mg/dL    eGFR >90 >60 mL/min/1.73m*2     Calcium 8.9 8.6 - 10.6 mg/dL    Phosphorus 2.6 2.5 - 4.9 mg/dL    Albumin 3.4 3.4 - 5.0 g/dL               IMAGING/ DIAGNOSTIC TESTING:  I have personally reviewed the following test result(s):    2/14 CXR  IMPRESSION:  1.  Stable postoperative atelectasis/effusions without pneumothorax.      IMPRESSION & PLAN:  POD # 4  s/p Robotic MidCAB x 1. [place] to LAD  - Increase activity/ ambulation; PT/OT  - Encourage IS, C/DB; respiratory therapy; wean O2 as neftaly   - Cardiac rehab referral   - Continue cardiac meds: ASA, BB, statin   - continue plavix x1 month for off-pump CABG   - Pain and anticonstipation meds  - Maintain chest tubes until meet criteria to remove; monitor daily 1v PCXR while chest tubes in place   - 2v CXR ordered 2/14  - Postop echo not needed for isolated cabg  - NO wires  - Tele until discharge  - Optimize nutrition and electrolytes    Rhythm  - Tele: Afib 100's, converted to NSR on 2/15 at 1000  - Continue BB, amio bolus and gtt, mag  - 12/16 NSR, repeat 12lead EKG, stop IV amion and increase coreg to 6.25mg bid with room for bp support   - Adjust medications as tolerated    Acute Blood Loss Anemia   Recent Labs     02/16/25  0606 02/15/25  0733 02/14/25  0657 02/13/25  0218 02/12/25  1436 02/03/25  1357 01/10/25  0844   HGB 11.3* 12.1 11.7* 11.6* 11.6* 13.8 14.4   HCT 31.7* 34.0* 36.1 33.0* 33.6* 41.2 42.8   - MV, PO Iron x1mo  - Daily labs, transfuse as indicated    Thrombocytopenia  Recent Labs     02/16/25  0606 02/15/25  0733 02/14/25  0657 02/13/25  0218 02/12/25  1436 02/03/25  1357 01/10/25  0844    196 201 208 195 279 238   - Etiology likely postop/CPB related  - Continue to trend with daily CBCs    Volume/Electrolyte Status: Preop wt Weight: 71.6 kg (157 lb 13.6 oz)   Vitals:    02/16/25 0509   Weight: 72 kg (158 lb 12.8 oz)   - Start diuresis this evening-20mg IV lasix x1  - Adjust diuresis as needed for postop cardiac surgery hypervolemia  - Replete electrolytes for  hypokalemia/hypomagnesemia/hypophosphatemia as needed  - Daily weights and strict I&Os  - Daily RFP while admitted    Leukocytosis  Recent Labs     25  0606 02/15/25  0733 25  0657 25  0218 25  1436 25  1357 01/10/25  0844   WBC 7.9 9.7 14.7* 15.1* 21.5* 7.4 6.7     Temp (36hrs), Av.6 °C (97.8 °F), Min:36.3 °C (97.3 °F), Max:36.9 °C (98.4 °F)    - aggressive pulmonary hygiene  - monitor for s/s infection  - likely atelectasis/ postoperative in etiology  - daily CBC to follow      VTE Prophylaxis: SCDs/TEDs, ambulation, SQ heparin  Code Status: Full Code    Dispo  - PT/OT recs Low intensity  - Would benefit from homecare RN for cardiac surgery carepath  - Anticipate discharge 1-2 days, pending rhythm management, diuresis,  Pt prefers discharge on Monday r/t transportation from family  - Will continue to assess discharge needs      JAYLAN Bansal-CNP  Cardiac Surgery [facility]  [facility]  Team Phone [phone]    2025  12:48 PM

## 2025-02-16 NOTE — PROGRESS NOTES
Transitional Care Coordination     SOC with [facility] 02/19/25.     Patsy Cole RN, BSN CM   PRN Care Coordinator

## 2025-02-17 ENCOUNTER — DOCUMENTATION (OUTPATIENT)
Dept: HOME HEALTH SERVICES | Facility: HOME HEALTH | Age: 75
End: 2025-02-17
Payer: MEDICARE

## 2025-02-17 VITALS
RESPIRATION RATE: 16 BRPM | WEIGHT: 159.1 LBS | HEART RATE: 65 BPM | DIASTOLIC BLOOD PRESSURE: 64 MMHG | SYSTOLIC BLOOD PRESSURE: 111 MMHG | BODY MASS INDEX: 25.57 KG/M2 | TEMPERATURE: 97.5 F | HEIGHT: 66 IN | OXYGEN SATURATION: 96 %

## 2025-02-17 LAB
ALBUMIN SERPL BCP-MCNC: 3.4 G/DL (ref 3.4–5)
ANION GAP SERPL CALC-SCNC: 13 MMOL/L (ref 10–20)
ATRIAL RATE: 61 BPM
BUN SERPL-MCNC: 12 MG/DL (ref 6–23)
CALCIUM SERPL-MCNC: 8.9 MG/DL (ref 8.6–10.6)
CHLORIDE SERPL-SCNC: 106 MMOL/L (ref 98–107)
CO2 SERPL-SCNC: 27 MMOL/L (ref 21–32)
CREAT SERPL-MCNC: 0.64 MG/DL (ref 0.5–1.05)
EGFRCR SERPLBLD CKD-EPI 2021: >90 ML/MIN/1.73M*2
ERYTHROCYTE [DISTWIDTH] IN BLOOD BY AUTOMATED COUNT: 14.6 % (ref 11.5–14.5)
GLUCOSE SERPL-MCNC: 83 MG/DL (ref 74–99)
HCT VFR BLD AUTO: 34.6 % (ref 36–46)
HGB BLD-MCNC: 11.1 G/DL (ref 12–16)
MAGNESIUM SERPL-MCNC: 1.96 MG/DL (ref 1.6–2.4)
MCH RBC QN AUTO: 30 PG (ref 26–34)
MCHC RBC AUTO-ENTMCNC: 32.1 G/DL (ref 32–36)
MCV RBC AUTO: 94 FL (ref 80–100)
NRBC BLD-RTO: 0 /100 WBCS (ref 0–0)
P AXIS: 79 DEGREES
P OFFSET: 178 MS
P ONSET: 154 MS
PHOSPHATE SERPL-MCNC: 3.5 MG/DL (ref 2.5–4.9)
PLATELET # BLD AUTO: 243 X10*3/UL (ref 150–450)
POTASSIUM SERPL-SCNC: 3.6 MMOL/L (ref 3.5–5.3)
PR INTERVAL: 150 MS
Q ONSET: 229 MS
QRS COUNT: 10 BEATS
QRS DURATION: 72 MS
QT INTERVAL: 446 MS
QTC CALCULATION(BAZETT): 448 MS
QTC FREDERICIA: 448 MS
R AXIS: 49 DEGREES
RBC # BLD AUTO: 3.7 X10*6/UL (ref 4–5.2)
SODIUM SERPL-SCNC: 142 MMOL/L (ref 136–145)
T AXIS: 74 DEGREES
T OFFSET: 452 MS
VENTRICULAR RATE: 61 BPM
WBC # BLD AUTO: 8.7 X10*3/UL (ref 4.4–11.3)

## 2025-02-17 PROCEDURE — 2500000002 HC RX 250 W HCPCS SELF ADMINISTERED DRUGS (ALT 637 FOR MEDICARE OP, ALT 636 FOR OP/ED): Performed by: NURSE PRACTITIONER

## 2025-02-17 PROCEDURE — 80069 RENAL FUNCTION PANEL: CPT | Performed by: NURSE PRACTITIONER

## 2025-02-17 PROCEDURE — 2500000004 HC RX 250 GENERAL PHARMACY W/ HCPCS (ALT 636 FOR OP/ED): Performed by: NURSE PRACTITIONER

## 2025-02-17 PROCEDURE — 2500000001 HC RX 250 WO HCPCS SELF ADMINISTERED DRUGS (ALT 637 FOR MEDICARE OP)

## 2025-02-17 PROCEDURE — 85027 COMPLETE CBC AUTOMATED: CPT | Performed by: NURSE PRACTITIONER

## 2025-02-17 PROCEDURE — 36415 COLL VENOUS BLD VENIPUNCTURE: CPT | Performed by: NURSE PRACTITIONER

## 2025-02-17 PROCEDURE — 83735 ASSAY OF MAGNESIUM: CPT | Performed by: NURSE PRACTITIONER

## 2025-02-17 PROCEDURE — 2500000001 HC RX 250 WO HCPCS SELF ADMINISTERED DRUGS (ALT 637 FOR MEDICARE OP): Performed by: NURSE PRACTITIONER

## 2025-02-17 PROCEDURE — 2500000002 HC RX 250 W HCPCS SELF ADMINISTERED DRUGS (ALT 637 FOR MEDICARE OP, ALT 636 FOR OP/ED)

## 2025-02-17 PROCEDURE — 99238 HOSP IP/OBS DSCHRG MGMT 30/<: CPT | Performed by: NURSE PRACTITIONER

## 2025-02-17 RX ORDER — AMIODARONE HYDROCHLORIDE 400 MG/1
TABLET ORAL
Qty: 27 TABLET | Refills: 0 | Status: SHIPPED | OUTPATIENT
Start: 2025-02-17 | End: 2025-03-25

## 2025-02-17 RX ORDER — POTASSIUM CHLORIDE 20 MEQ/1
40 TABLET, EXTENDED RELEASE ORAL ONCE
Status: COMPLETED | OUTPATIENT
Start: 2025-02-17 | End: 2025-02-17

## 2025-02-17 RX ORDER — CLOPIDOGREL BISULFATE 75 MG/1
75 TABLET ORAL DAILY
Qty: 30 TABLET | Refills: 0 | Status: SHIPPED | OUTPATIENT
Start: 2025-02-18

## 2025-02-17 RX ORDER — NAPROXEN SODIUM 220 MG/1
81 TABLET, FILM COATED ORAL DAILY
COMMUNITY
Start: 2025-02-18

## 2025-02-17 RX ORDER — ROSUVASTATIN CALCIUM 40 MG/1
40 TABLET, COATED ORAL DAILY
Qty: 30 TABLET | Refills: 0 | Status: SHIPPED | OUTPATIENT
Start: 2025-02-18

## 2025-02-17 RX ORDER — AMIODARONE HYDROCHLORIDE 200 MG/1
200 TABLET ORAL DAILY
Status: DISCONTINUED | OUTPATIENT
Start: 2025-02-24 | End: 2025-02-17 | Stop reason: HOSPADM

## 2025-02-17 RX ORDER — COLCHICINE 0.6 MG/1
0.3 TABLET ORAL DAILY
Qty: 30 TABLET | Refills: 0 | Status: SHIPPED | OUTPATIENT
Start: 2025-02-18

## 2025-02-17 RX ORDER — CARVEDILOL 6.25 MG/1
6.25 TABLET ORAL 2 TIMES DAILY
Qty: 60 TABLET | Refills: 0 | Status: SHIPPED | OUTPATIENT
Start: 2025-02-17

## 2025-02-17 RX ORDER — CLOPIDOGREL BISULFATE 75 MG/1
75 TABLET ORAL DAILY
Qty: 30 TABLET | Refills: 0 | Status: SHIPPED | OUTPATIENT
Start: 2025-02-18 | End: 2025-02-21 | Stop reason: HOSPADM

## 2025-02-17 RX ORDER — LANOLIN ALCOHOL/MO/W.PET/CERES
400 CREAM (GRAM) TOPICAL ONCE
Status: COMPLETED | OUTPATIENT
Start: 2025-02-17 | End: 2025-02-17

## 2025-02-17 RX ORDER — CLOPIDOGREL BISULFATE 75 MG/1
75 TABLET ORAL DAILY
Status: DISCONTINUED | OUTPATIENT
Start: 2025-02-18 | End: 2025-02-17 | Stop reason: HOSPADM

## 2025-02-17 RX ORDER — POLYETHYLENE GLYCOL 3350 17 G/17G
17 POWDER, FOR SOLUTION ORAL DAILY PRN
COMMUNITY
Start: 2025-02-17

## 2025-02-17 RX ORDER — ACETAMINOPHEN 325 MG/1
650 TABLET ORAL EVERY 6 HOURS PRN
COMMUNITY
Start: 2025-02-17

## 2025-02-17 RX ORDER — AMIODARONE HYDROCHLORIDE 200 MG/1
400 TABLET ORAL 2 TIMES DAILY
Status: DISCONTINUED | OUTPATIENT
Start: 2025-02-17 | End: 2025-02-17 | Stop reason: HOSPADM

## 2025-02-17 RX ADMIN — ACETAMINOPHEN 650 MG: 325 TABLET ORAL at 12:24

## 2025-02-17 RX ADMIN — POTASSIUM CHLORIDE 40 MEQ: 1500 TABLET, EXTENDED RELEASE ORAL at 12:24

## 2025-02-17 RX ADMIN — ROSUVASTATIN CALCIUM 40 MG: 20 TABLET, FILM COATED ORAL at 09:19

## 2025-02-17 RX ADMIN — ASPIRIN 81 MG CHEWABLE TABLET 81 MG: 81 TABLET CHEWABLE at 09:19

## 2025-02-17 RX ADMIN — HEPARIN SODIUM 5000 UNITS: 5000 INJECTION, SOLUTION INTRAVENOUS; SUBCUTANEOUS at 01:30

## 2025-02-17 RX ADMIN — CLOPIDOGREL BISULFATE 75 MG: 75 TABLET ORAL at 09:19

## 2025-02-17 RX ADMIN — CARVEDILOL 6.25 MG: 3.12 TABLET, FILM COATED ORAL at 09:19

## 2025-02-17 RX ADMIN — AMIODARONE HYDROCHLORIDE 400 MG: 200 TABLET ORAL at 09:19

## 2025-02-17 RX ADMIN — MAGNESIUM OXIDE TAB 400 MG (241.3 MG ELEMENTAL MG) 400 MG: 400 (241.3 MG) TAB at 12:24

## 2025-02-17 RX ADMIN — ACETAMINOPHEN 650 MG: 325 TABLET ORAL at 00:04

## 2025-02-17 RX ADMIN — COLCHICINE 0.3 MG: 0.6 TABLET, FILM COATED ORAL at 09:19

## 2025-02-17 RX ADMIN — HEPARIN SODIUM 5000 UNITS: 5000 INJECTION, SOLUTION INTRAVENOUS; SUBCUTANEOUS at 09:19

## 2025-02-17 RX ADMIN — LEVOTHYROXINE SODIUM 125 MCG: 0.12 TABLET ORAL at 05:02

## 2025-02-17 RX ADMIN — ACETAMINOPHEN 650 MG: 325 TABLET ORAL at 05:02

## 2025-02-17 ASSESSMENT — PAIN - FUNCTIONAL ASSESSMENT: PAIN_FUNCTIONAL_ASSESSMENT: 0-10

## 2025-02-17 ASSESSMENT — PAIN SCALES - GENERAL: PAINLEVEL_OUTOF10: 0 - NO PAIN

## 2025-02-17 ASSESSMENT — COGNITIVE AND FUNCTIONAL STATUS - GENERAL
MOBILITY SCORE: 24
DAILY ACTIVITIY SCORE: 24

## 2025-02-17 ASSESSMENT — PAIN DESCRIPTION - LOCATION: LOCATION: CHEST

## 2025-02-17 NOTE — DISCHARGE SUMMARY
Discharge Diagnosis  Coronary artery disease of native artery of native heart with stable angina pectoris    Issues Requiring Follow-Up  Amio load ordered to complete after additional 6 days. Will need outpatient follow up.    Will need stent for circumflex lesion.    Test Results Pending At Discharge  Pending Labs       No current pending labs.            Hospital Course  74 year old female with PMH CAD, PVC's, HLD, hypothyroidism, chronic UTI, trigeminal neuralgia who had a recent heart cath showing 2 vessel disease. She is now s/p robotic assisted minimally invasive CABG pedicled MIRANDA to LAD with Dr. Ian Schmitt 2/12. Post op course c/b afib which converted to NSR on amio and coreg. See progress note for additional details.     Pertinent Physical Exam At Time of Discharge  Physical Exam- see daily note    Home Medications     Medication List      ASK your doctor about these medications     aspirin 81 mg EC tablet   * chlorhexidine 4 % external liquid; Commonly known as: Hibiclens; Apply   1 Application topically once daily for 5 days. Use per CPM/PAT provided   instructions; Ask about: Should I take this medication?   * chlorhexidine 0.12 % solution; Commonly known as: Peridex; Use 15 mL   in the mouth or throat once daily for 2 doses.; Ask about: Should I take   this medication?   cyanocobalamin 500 mcg tablet; Commonly known as: Vitamin B-12   dilTIAZem  mg 24 hr capsule; Commonly known as: Cardizem CD; Take   1 capsule (240 mg) by mouth once daily.   estradiol 0.01 % (0.1 mg/gram) vaginal cream; Commonly known as: Estrace   evening primrose oil 500 mg   isosorbide mononitrate ER 60 mg 24 hr tablet; Commonly known as: Imdur;   Take 1 tablet (60 mg) by mouth once daily. Do not crush or chew.   levothyroxine 125 mcg tablet; Commonly known as: Synthroid, Levoxyl   multivitamin tablet   nitrofurantoin 50 mg capsule; Commonly known as: Macrodantin   nitroglycerin 0.3 mg SL tablet; Commonly known as: Nitrostat;  Place 1   tablet (0.3 mg) under the tongue every 5 minutes if needed for chest pain   (up to 3).   rosuvastatin 20 mg tablet; Commonly known as: Crestor; Take 1 tablet (20   mg) by mouth once daily.  * This list has 2 medication(s) that are the same as other medications   prescribed for you. Read the directions carefully, and ask your doctor or   other care provider to review them with you.       Outpatient Follow-Up  Future Appointments   Date Time Provider Department Center   2/27/2025  8:40 AM CARDSURG [facility] [id number] NURSE [id number] [facility]   3/17/2025  2:15 PM Edgar Schmitt MD [id number] [facility]   7/30/2025  3:30 PM Silvia Can MD [id number] [facility]   10/6/2025  9:00 AM Wood Turner MD [id number] [facility]       Miriam Vale, APRN-CNP

## 2025-02-17 NOTE — PROGRESS NOTES
Home Care was notified of patients discharge to home today. They confirmed start of care in 48 hrs.  Ofe Foster RN

## 2025-02-17 NOTE — CARE PLAN
The patient's goals for the shift include    Getting 8 hours of rest   The clinical goals for the shift include safety    Over the shift, the patient did not make progress toward the following goals.     Problem: Safety - Adult  Goal: Free from fall injury  Outcome: Progressing  Flowsheets (Taken 2/16/2025 2216)  Free from fall injury:   Instruct family/caregiver on patient safety   Based on caregiver fall risk screen, instruct family/caregiver to ask for assistance with transferring infant if caregiver noted to have fall risk factors     Problem: Skin  Goal: Promote skin healing  Outcome: Progressing

## 2025-02-17 NOTE — HH CARE COORDINATION
Home Care received a Referral for Nursing and Physical Therapy. We have processed the referral for a Start of Care on 2/19/25.     If you have any questions or concerns, please feel free to contact us at [phone]. Follow the prompts, enter your five digit zip code, and you will be directed to your care team on EAST 3.

## 2025-02-18 ENCOUNTER — HOME CARE VISIT (OUTPATIENT)
Dept: HOME HEALTH SERVICES | Facility: HOME HEALTH | Age: 75
End: 2025-02-18
Payer: MEDICARE

## 2025-02-18 VITALS
WEIGHT: 155 LBS | HEART RATE: 67 BPM | BODY MASS INDEX: 24.91 KG/M2 | OXYGEN SATURATION: 95 % | DIASTOLIC BLOOD PRESSURE: 65 MMHG | TEMPERATURE: 96.8 F | SYSTOLIC BLOOD PRESSURE: 122 MMHG | HEIGHT: 66 IN

## 2025-02-18 PROCEDURE — 169592 NO-PAY CLAIM PROCEDURE

## 2025-02-18 PROCEDURE — G0299 HHS/HOSPICE OF RN EA 15 MIN: HCPCS | Mod: HHH

## 2025-02-18 ASSESSMENT — ENCOUNTER SYMPTOMS
DYSPNEA ON EXERTION: 1
LAST BOWEL MOVEMENT: 67254
MUSCLE WEAKNESS: 1
STOOL FREQUENCY: DAILY
PAIN LOCATION: INCISION
DIARRHEA: 1
SUBJECTIVE PAIN PROGRESSION: UNCHANGED
CHANGE IN APPETITE: INCREASED
FATIGUES EASILY: 1
PERSON REPORTING PAIN: PATIENT
PAIN LOCATION - PAIN SEVERITY: 3/10
APPETITE LEVEL: FAIR
PAIN: 1

## 2025-02-18 ASSESSMENT — ACTIVITIES OF DAILY LIVING (ADL)
OASIS_M1830: 04
ENTERING_EXITING_HOME: SUPERVISION

## 2025-02-19 ENCOUNTER — TELEPHONE (OUTPATIENT)
Dept: CARDIOLOGY | Facility: CLINIC | Age: 75
End: 2025-02-19
Payer: MEDICARE

## 2025-02-19 ENCOUNTER — HOME CARE VISIT (OUTPATIENT)
Dept: HOME HEALTH SERVICES | Facility: HOME HEALTH | Age: 75
End: 2025-02-19
Payer: MEDICARE

## 2025-02-19 VITALS
TEMPERATURE: 98.2 F | RESPIRATION RATE: 16 BRPM | DIASTOLIC BLOOD PRESSURE: 72 MMHG | HEART RATE: 66 BPM | OXYGEN SATURATION: 97 % | SYSTOLIC BLOOD PRESSURE: 124 MMHG

## 2025-02-19 LAB
ALBUMIN SERPL-MCNC: 4.2 G/DL (ref 3.6–5.1)
BUN SERPL-MCNC: 16 MG/DL (ref 7–25)
BUN/CREAT SERPL: NORMAL (CALC) (ref 6–22)
CALCIUM SERPL-MCNC: 9.5 MG/DL (ref 8.6–10.4)
CHLORIDE SERPL-SCNC: 104 MMOL/L (ref 98–110)
CO2 SERPL-SCNC: 26 MMOL/L (ref 20–32)
CREAT SERPL-MCNC: 0.71 MG/DL (ref 0.6–1)
EGFRCR SERPLBLD CKD-EPI 2021: 88 ML/MIN/1.73M2
ERYTHROCYTE [DISTWIDTH] IN BLOOD BY AUTOMATED COUNT: 12.9 % (ref 11–15)
GLUCOSE SERPL-MCNC: 72 MG/DL (ref 65–99)
HCT VFR BLD AUTO: 38.9 % (ref 35–45)
HGB BLD-MCNC: 12.7 G/DL (ref 11.7–15.5)
MAGNESIUM SERPL-MCNC: 2.3 MG/DL (ref 1.5–2.5)
MCH RBC QN AUTO: 30.2 PG (ref 27–33)
MCHC RBC AUTO-ENTMCNC: 32.6 G/DL (ref 32–36)
MCV RBC AUTO: 92.6 FL (ref 80–100)
PHOSPHATE SERPL-MCNC: 3.8 MG/DL (ref 2.1–4.3)
PLATELET # BLD AUTO: 293 THOUSAND/UL (ref 140–400)
PMV BLD REES-ECKER: 12.5 FL (ref 7.5–12.5)
POTASSIUM SERPL-SCNC: 4.4 MMOL/L (ref 3.5–5.3)
RBC # BLD AUTO: 4.2 MILLION/UL (ref 3.8–5.1)
SODIUM SERPL-SCNC: 141 MMOL/L (ref 135–146)
WBC # BLD AUTO: 8.7 THOUSAND/UL (ref 3.8–10.8)

## 2025-02-19 PROCEDURE — G0151 HHCP-SERV OF PT,EA 15 MIN: HCPCS | Mod: HHH

## 2025-02-19 SDOH — HEALTH STABILITY: PHYSICAL HEALTH: EXERCISE TYPE: SEE COMMENTS BELOW

## 2025-02-19 SDOH — HEALTH STABILITY: PHYSICAL HEALTH
EXERCISE COMMENTS: STANDING HEEL RAISES X 10, MINI SQUATS X 10 HIP ABD, HIP EXT, HAMSTRING CURLS AND MARCHING X 10 REPS EACH LE

## 2025-02-19 ASSESSMENT — GAIT ASSESSMENTS
TRUNK: 2 - NO SWAY, NO FLEXION, NO USE OF ARMS, NO WALKING AID
INITIATION OF GAIT IMMEDIATELY AFTER GO: 1 - NO HESITANCY
BALANCE AND GAIT SCORE: 23
GAIT SCORE: 10
STEP CONTINUITY: 1 - STEPS APPEAR CONTINUOUS
WALKING STANCE: 0 - HEELS APART
PATH SCORE: 1
TRUNK SCORE: 2
PATH: 1 - MILD/MODERATE DEVIATION OR USES WALKING AID
STEP SYMMETRY: 1 - RIGHT AND LEFT STEP LENGTH APPEAR EQUAL

## 2025-02-19 ASSESSMENT — BALANCE ASSESSMENTS
EYES CLOSED AT MAXIMUM POSITION NUDGED: 1 - STEADY
SITTING DOWN: 1 - USES ARMS OR NOT SMOOTH MOTION
STANDING BALANCE: 1 - STEADY BUT WIDE STANCE AND USES CANE OR OTHER SUPPORT
ARISING SCORE: 1
TURNING 360 DEGREES STEPS: 1 - CONTINUOUS STEPS
ARISES: 1 - ABLE, USES ARMS TO HELP
IMMEDIATE STANDING BALANCE FIRST 5 SECONDS: 2 - STEADY WITHOUT WALKER OR OTHER SUPPORT
ATTEMPTS TO ARISE: 2 - ABLE TO RISE, ONE ATTEMPT
SITTING BALANCE: 1 - STEADY, SAFE
NUDGED SCORE: 2
BALANCE SCORE: 13
NUDGED: 2 - STEADY

## 2025-02-19 ASSESSMENT — ENCOUNTER SYMPTOMS
PAIN LOCATION - PAIN DURATION: VARIES
PAIN LOCATION - PAIN SEVERITY: 3/10
PAIN LOCATION - PAIN FREQUENCY: INTERMITTENT
PAIN LOCATION: CHEST
MUSCLE WEAKNESS: 1
PERSON REPORTING PAIN: PATIENT
PAIN LOCATION - EXACERBATING FACTORS: NOTHING
PAIN SEVERITY GOAL: 0/10
PAIN LOCATION - RELIEVING FACTORS: REST, MEDS, HEAT
PAIN LOCATION - PAIN QUALITY: SORE
LOWEST PAIN SEVERITY IN PAST 24 HOURS: 0/10
HIGHEST PAIN SEVERITY IN PAST 24 HOURS: 3/10
PAIN: 1
SUBJECTIVE PAIN PROGRESSION: GRADUALLY IMPROVING

## 2025-02-19 ASSESSMENT — ACTIVITIES OF DAILY LIVING (ADL)
AMBULATION ASSISTANCE: INDEPENDENT
AMBULATION ASSISTANCE: 1
AMBULATION ASSISTANCE ON FLAT SURFACES: 1

## 2025-02-21 ENCOUNTER — APPOINTMENT (OUTPATIENT)
Dept: CARDIOLOGY | Facility: HOSPITAL | Age: 75
End: 2025-02-21
Payer: MEDICARE

## 2025-02-21 ENCOUNTER — APPOINTMENT (OUTPATIENT)
Dept: RADIOLOGY | Facility: HOSPITAL | Age: 75
End: 2025-02-21
Payer: MEDICARE

## 2025-02-21 ENCOUNTER — HOSPITAL ENCOUNTER (OUTPATIENT)
Facility: HOSPITAL | Age: 75
Setting detail: OBSERVATION
Discharge: HOME | End: 2025-02-21
Attending: EMERGENCY MEDICINE | Admitting: STUDENT IN AN ORGANIZED HEALTH CARE EDUCATION/TRAINING PROGRAM
Payer: MEDICARE

## 2025-02-21 VITALS
TEMPERATURE: 97.3 F | SYSTOLIC BLOOD PRESSURE: 146 MMHG | BODY MASS INDEX: 24.59 KG/M2 | DIASTOLIC BLOOD PRESSURE: 72 MMHG | HEIGHT: 66 IN | OXYGEN SATURATION: 96 % | RESPIRATION RATE: 16 BRPM | HEART RATE: 61 BPM | WEIGHT: 153 LBS

## 2025-02-21 DIAGNOSIS — I20.89 STABLE ANGINA PECTORIS (CMS-HCC): ICD-10-CM

## 2025-02-21 DIAGNOSIS — I20.0 ANGINA PECTORIS, UNSTABLE (MULTI): ICD-10-CM

## 2025-02-21 DIAGNOSIS — R07.9 CHEST PAIN, UNSPECIFIED TYPE: Primary | ICD-10-CM

## 2025-02-21 LAB
ALBUMIN SERPL BCP-MCNC: 4 G/DL (ref 3.4–5)
ALP SERPL-CCNC: 62 U/L (ref 33–136)
ALT SERPL W P-5'-P-CCNC: 32 U/L (ref 7–45)
ANION GAP SERPL CALC-SCNC: 11 MMOL/L (ref 10–20)
AST SERPL W P-5'-P-CCNC: 21 U/L (ref 9–39)
ATRIAL RATE: 61 BPM
ATRIAL RATE: 73 BPM
BASOPHILS # BLD AUTO: 0.05 X10*3/UL (ref 0–0.1)
BASOPHILS NFR BLD AUTO: 0.5 %
BILIRUB SERPL-MCNC: 0.7 MG/DL (ref 0–1.2)
BUN SERPL-MCNC: 11 MG/DL (ref 6–23)
CALCIUM SERPL-MCNC: 9.2 MG/DL (ref 8.6–10.3)
CARDIAC TROPONIN I PNL SERPL HS: 4 NG/L (ref 0–13)
CARDIAC TROPONIN I PNL SERPL HS: 5 NG/L (ref 0–13)
CHLORIDE SERPL-SCNC: 105 MMOL/L (ref 98–107)
CO2 SERPL-SCNC: 27 MMOL/L (ref 21–32)
CREAT SERPL-MCNC: 0.83 MG/DL (ref 0.5–1.05)
EGFRCR SERPLBLD CKD-EPI 2021: 73 ML/MIN/1.73M*2
EJECTION FRACTION APICAL 4 CHAMBER: 65.8
EJECTION FRACTION: 64 %
EOSINOPHIL # BLD AUTO: 0.53 X10*3/UL (ref 0–0.4)
EOSINOPHIL NFR BLD AUTO: 5.2 %
ERYTHROCYTE [DISTWIDTH] IN BLOOD BY AUTOMATED COUNT: 14.7 % (ref 11.5–14.5)
GLUCOSE SERPL-MCNC: 87 MG/DL (ref 74–99)
HCT VFR BLD AUTO: 37 % (ref 36–46)
HGB BLD-MCNC: 12.3 G/DL (ref 12–16)
HOLD SPECIMEN: NORMAL
HOLD SPECIMEN: NORMAL
IMM GRANULOCYTES # BLD AUTO: 0.1 X10*3/UL (ref 0–0.5)
IMM GRANULOCYTES NFR BLD AUTO: 1 % (ref 0–0.9)
LEFT VENTRICLE INTERNAL DIMENSION DIASTOLE: 5.22 CM (ref 3.5–6)
LIPASE SERPL-CCNC: 25 U/L (ref 9–82)
LYMPHOCYTES # BLD AUTO: 2.58 X10*3/UL (ref 0.8–3)
LYMPHOCYTES NFR BLD AUTO: 25.2 %
MCH RBC QN AUTO: 30.4 PG (ref 26–34)
MCHC RBC AUTO-ENTMCNC: 33.2 G/DL (ref 32–36)
MCV RBC AUTO: 92 FL (ref 80–100)
MITRAL VALVE E/A RATIO: 0.76
MONOCYTES # BLD AUTO: 1.1 X10*3/UL (ref 0.05–0.8)
MONOCYTES NFR BLD AUTO: 10.7 %
NEUTROPHILS # BLD AUTO: 5.88 X10*3/UL (ref 1.6–5.5)
NEUTROPHILS NFR BLD AUTO: 57.4 %
NRBC BLD-RTO: 0 /100 WBCS (ref 0–0)
P AXIS: 42 DEGREES
P AXIS: 82 DEGREES
P OFFSET: 178 MS
P OFFSET: 181 MS
P ONSET: 140 MS
P ONSET: 140 MS
PLATELET # BLD AUTO: 385 X10*3/UL (ref 150–450)
POTASSIUM SERPL-SCNC: 4.2 MMOL/L (ref 3.5–5.3)
PR INTERVAL: 170 MS
PR INTERVAL: 174 MS
PROT SERPL-MCNC: 6.9 G/DL (ref 6.4–8.2)
Q ONSET: 225 MS
Q ONSET: 227 MS
QRS COUNT: 10 BEATS
QRS COUNT: 12 BEATS
QRS DURATION: 72 MS
QRS DURATION: 84 MS
QT INTERVAL: 420 MS
QT INTERVAL: 424 MS
QTC CALCULATION(BAZETT): 426 MS
QTC CALCULATION(BAZETT): 462 MS
QTC FREDERICIA: 426 MS
QTC FREDERICIA: 448 MS
R AXIS: 11 DEGREES
R AXIS: 5 DEGREES
RBC # BLD AUTO: 4.04 X10*6/UL (ref 4–5.2)
RIGHT VENTRICLE FREE WALL PEAK S': 10.1 CM/S
RIGHT VENTRICLE PEAK SYSTOLIC PRESSURE: 27.2 MMHG
SODIUM SERPL-SCNC: 139 MMOL/L (ref 136–145)
T AXIS: 40 DEGREES
T AXIS: 54 DEGREES
T OFFSET: 435 MS
T OFFSET: 439 MS
TRICUSPID ANNULAR PLANE SYSTOLIC EXCURSION: 1.7 CM
VENTRICULAR RATE: 61 BPM
VENTRICULAR RATE: 73 BPM
WBC # BLD AUTO: 10.2 X10*3/UL (ref 4.4–11.3)

## 2025-02-21 PROCEDURE — 93005 ELECTROCARDIOGRAM TRACING: CPT

## 2025-02-21 PROCEDURE — 93308 TTE F-UP OR LMTD: CPT | Performed by: INTERNAL MEDICINE

## 2025-02-21 PROCEDURE — 84484 ASSAY OF TROPONIN QUANT: CPT | Performed by: EMERGENCY MEDICINE

## 2025-02-21 PROCEDURE — 36415 COLL VENOUS BLD VENIPUNCTURE: CPT | Performed by: EMERGENCY MEDICINE

## 2025-02-21 PROCEDURE — 71045 X-RAY EXAM CHEST 1 VIEW: CPT

## 2025-02-21 PROCEDURE — 2500000001 HC RX 250 WO HCPCS SELF ADMINISTERED DRUGS (ALT 637 FOR MEDICARE OP): Performed by: EMERGENCY MEDICINE

## 2025-02-21 PROCEDURE — 85025 COMPLETE CBC W/AUTO DIFF WBC: CPT | Performed by: EMERGENCY MEDICINE

## 2025-02-21 PROCEDURE — 80053 COMPREHEN METABOLIC PANEL: CPT | Performed by: EMERGENCY MEDICINE

## 2025-02-21 PROCEDURE — 96372 THER/PROPH/DIAG INJ SC/IM: CPT | Performed by: STUDENT IN AN ORGANIZED HEALTH CARE EDUCATION/TRAINING PROGRAM

## 2025-02-21 PROCEDURE — G0378 HOSPITAL OBSERVATION PER HR: HCPCS

## 2025-02-21 PROCEDURE — 83690 ASSAY OF LIPASE: CPT | Performed by: EMERGENCY MEDICINE

## 2025-02-21 PROCEDURE — 2500000001 HC RX 250 WO HCPCS SELF ADMINISTERED DRUGS (ALT 637 FOR MEDICARE OP)

## 2025-02-21 PROCEDURE — 93321 DOPPLER ECHO F-UP/LMTD STD: CPT | Performed by: INTERNAL MEDICINE

## 2025-02-21 PROCEDURE — 71045 X-RAY EXAM CHEST 1 VIEW: CPT | Performed by: SURGERY

## 2025-02-21 PROCEDURE — 99285 EMERGENCY DEPT VISIT HI MDM: CPT | Mod: 25 | Performed by: EMERGENCY MEDICINE

## 2025-02-21 PROCEDURE — 2500000001 HC RX 250 WO HCPCS SELF ADMINISTERED DRUGS (ALT 637 FOR MEDICARE OP): Performed by: STUDENT IN AN ORGANIZED HEALTH CARE EDUCATION/TRAINING PROGRAM

## 2025-02-21 PROCEDURE — 2500000004 HC RX 250 GENERAL PHARMACY W/ HCPCS (ALT 636 FOR OP/ED): Performed by: STUDENT IN AN ORGANIZED HEALTH CARE EDUCATION/TRAINING PROGRAM

## 2025-02-21 PROCEDURE — 2500000001 HC RX 250 WO HCPCS SELF ADMINISTERED DRUGS (ALT 637 FOR MEDICARE OP): Performed by: PHYSICIAN ASSISTANT

## 2025-02-21 PROCEDURE — 99239 HOSP IP/OBS DSCHRG MGMT >30: CPT

## 2025-02-21 PROCEDURE — 93308 TTE F-UP OR LMTD: CPT

## 2025-02-21 PROCEDURE — 99222 1ST HOSP IP/OBS MODERATE 55: CPT | Performed by: STUDENT IN AN ORGANIZED HEALTH CARE EDUCATION/TRAINING PROGRAM

## 2025-02-21 PROCEDURE — 99223 1ST HOSP IP/OBS HIGH 75: CPT | Performed by: INTERNAL MEDICINE

## 2025-02-21 PROCEDURE — 93325 DOPPLER ECHO COLOR FLOW MAPG: CPT | Performed by: INTERNAL MEDICINE

## 2025-02-21 PROCEDURE — 2500000002 HC RX 250 W HCPCS SELF ADMINISTERED DRUGS (ALT 637 FOR MEDICARE OP, ALT 636 FOR OP/ED): Mod: MUE | Performed by: STUDENT IN AN ORGANIZED HEALTH CARE EDUCATION/TRAINING PROGRAM

## 2025-02-21 RX ORDER — NAPROXEN SODIUM 220 MG/1
81 TABLET, FILM COATED ORAL DAILY
Status: DISCONTINUED | OUTPATIENT
Start: 2025-02-22 | End: 2025-02-21 | Stop reason: HOSPADM

## 2025-02-21 RX ORDER — NITROGLYCERIN 0.3 MG/1
0.3 TABLET SUBLINGUAL EVERY 5 MIN PRN
Qty: 30 TABLET | Refills: 0 | Status: SHIPPED | OUTPATIENT
Start: 2025-02-21

## 2025-02-21 RX ORDER — SIMETHICONE 80 MG
80 TABLET,CHEWABLE ORAL 4 TIMES DAILY PRN
Status: DISCONTINUED | OUTPATIENT
Start: 2025-02-21 | End: 2025-02-21 | Stop reason: HOSPADM

## 2025-02-21 RX ORDER — POLYETHYLENE GLYCOL 3350 17 G/17G
17 POWDER, FOR SOLUTION ORAL DAILY
Status: DISCONTINUED | OUTPATIENT
Start: 2025-02-21 | End: 2025-02-21 | Stop reason: HOSPADM

## 2025-02-21 RX ORDER — AMIODARONE HYDROCHLORIDE 200 MG/1
200 TABLET ORAL DAILY
Status: DISCONTINUED | OUTPATIENT
Start: 2025-02-23 | End: 2025-02-21 | Stop reason: HOSPADM

## 2025-02-21 RX ORDER — AMIODARONE HYDROCHLORIDE 200 MG/1
400 TABLET ORAL 2 TIMES DAILY
Status: DISCONTINUED | OUTPATIENT
Start: 2025-02-21 | End: 2025-02-21 | Stop reason: HOSPADM

## 2025-02-21 RX ORDER — NITROGLYCERIN 0.4 MG/1
0.4 TABLET SUBLINGUAL ONCE
Status: COMPLETED | OUTPATIENT
Start: 2025-02-21 | End: 2025-02-21

## 2025-02-21 RX ORDER — COLCHICINE 0.6 MG/1
0.3 TABLET ORAL DAILY
Status: DISCONTINUED | OUTPATIENT
Start: 2025-02-21 | End: 2025-02-21 | Stop reason: HOSPADM

## 2025-02-21 RX ORDER — LEVOTHYROXINE SODIUM 125 UG/1
125 TABLET ORAL DAILY
Status: DISCONTINUED | OUTPATIENT
Start: 2025-02-21 | End: 2025-02-21 | Stop reason: HOSPADM

## 2025-02-21 RX ORDER — TALC
3 POWDER (GRAM) TOPICAL NIGHTLY PRN
Status: DISCONTINUED | OUTPATIENT
Start: 2025-02-21 | End: 2025-02-21 | Stop reason: HOSPADM

## 2025-02-21 RX ORDER — ONDANSETRON HYDROCHLORIDE 2 MG/ML
4 INJECTION, SOLUTION INTRAVENOUS EVERY 4 HOURS PRN
Status: DISCONTINUED | OUTPATIENT
Start: 2025-02-21 | End: 2025-02-21 | Stop reason: HOSPADM

## 2025-02-21 RX ORDER — CARVEDILOL 6.25 MG/1
6.25 TABLET ORAL 2 TIMES DAILY
Status: DISCONTINUED | OUTPATIENT
Start: 2025-02-21 | End: 2025-02-21 | Stop reason: HOSPADM

## 2025-02-21 RX ORDER — NITROGLYCERIN 0.4 MG/1
0.4 TABLET SUBLINGUAL EVERY 5 MIN PRN
Status: DISCONTINUED | OUTPATIENT
Start: 2025-02-21 | End: 2025-02-21 | Stop reason: HOSPADM

## 2025-02-21 RX ORDER — ENOXAPARIN SODIUM 100 MG/ML
40 INJECTION SUBCUTANEOUS EVERY 24 HOURS
Status: DISCONTINUED | OUTPATIENT
Start: 2025-02-21 | End: 2025-02-21 | Stop reason: HOSPADM

## 2025-02-21 RX ORDER — CLOPIDOGREL BISULFATE 75 MG/1
75 TABLET ORAL DAILY
Status: DISCONTINUED | OUTPATIENT
Start: 2025-02-21 | End: 2025-02-21 | Stop reason: HOSPADM

## 2025-02-21 RX ORDER — VIT C/E/ZN/COPPR/LUTEIN/ZEAXAN 250MG-90MG
500 CAPSULE ORAL WEEKLY
Status: DISCONTINUED | OUTPATIENT
Start: 2025-02-21 | End: 2025-02-21 | Stop reason: HOSPADM

## 2025-02-21 RX ORDER — NAPROXEN SODIUM 220 MG/1
324 TABLET, FILM COATED ORAL ONCE
Status: COMPLETED | OUTPATIENT
Start: 2025-02-21 | End: 2025-02-21

## 2025-02-21 RX ORDER — AMLODIPINE BESYLATE 5 MG/1
5 TABLET ORAL DAILY
Qty: 30 TABLET | Refills: 0 | Status: SHIPPED | OUTPATIENT
Start: 2025-02-22 | End: 2025-03-24

## 2025-02-21 RX ORDER — ACETAMINOPHEN 325 MG/1
650 TABLET ORAL EVERY 6 HOURS PRN
Status: DISCONTINUED | OUTPATIENT
Start: 2025-02-21 | End: 2025-02-21 | Stop reason: HOSPADM

## 2025-02-21 RX ORDER — ISOSORBIDE MONONITRATE 30 MG/1
60 TABLET, EXTENDED RELEASE ORAL DAILY
Status: DISCONTINUED | OUTPATIENT
Start: 2025-02-21 | End: 2025-02-21 | Stop reason: HOSPADM

## 2025-02-21 RX ORDER — AMLODIPINE BESYLATE 5 MG/1
5 TABLET ORAL DAILY
Status: DISCONTINUED | OUTPATIENT
Start: 2025-02-21 | End: 2025-02-21 | Stop reason: HOSPADM

## 2025-02-21 RX ORDER — ROSUVASTATIN CALCIUM 20 MG/1
40 TABLET, COATED ORAL DAILY
Status: DISCONTINUED | OUTPATIENT
Start: 2025-02-21 | End: 2025-02-21 | Stop reason: HOSPADM

## 2025-02-21 RX ORDER — ISOSORBIDE MONONITRATE 60 MG/1
60 TABLET, EXTENDED RELEASE ORAL DAILY
Qty: 30 TABLET | Refills: 0 | Status: SHIPPED | OUTPATIENT
Start: 2025-02-22 | End: 2025-03-24

## 2025-02-21 RX ADMIN — ISOSORBIDE MONONITRATE 60 MG: 30 TABLET, EXTENDED RELEASE ORAL at 12:09

## 2025-02-21 RX ADMIN — NITROGLYCERIN 0.4 MG: 0.4 TABLET SUBLINGUAL at 09:07

## 2025-02-21 RX ADMIN — LEVOTHYROXINE SODIUM 125 MCG: 0.12 TABLET ORAL at 09:20

## 2025-02-21 RX ADMIN — ACETAMINOPHEN 650 MG: 325 TABLET, FILM COATED ORAL at 12:09

## 2025-02-21 RX ADMIN — AMIODARONE HYDROCHLORIDE 400 MG: 200 TABLET ORAL at 09:19

## 2025-02-21 RX ADMIN — NITROGLYCERIN 0.4 MG: 0.4 TABLET SUBLINGUAL at 04:50

## 2025-02-21 RX ADMIN — CARVEDILOL 6.25 MG: 6.25 TABLET, FILM COATED ORAL at 09:19

## 2025-02-21 RX ADMIN — NITROGLYCERIN 0.4 MG: 0.4 TABLET SUBLINGUAL at 05:55

## 2025-02-21 RX ADMIN — ASPIRIN 81 MG CHEWABLE TABLET 324 MG: 81 TABLET CHEWABLE at 05:53

## 2025-02-21 RX ADMIN — NITROGLYCERIN 0.4 MG: 0.4 TABLET SUBLINGUAL at 08:54

## 2025-02-21 RX ADMIN — NITROGLYCERIN 0.4 MG: 0.4 TABLET SUBLINGUAL at 09:02

## 2025-02-21 RX ADMIN — ENOXAPARIN SODIUM 40 MG: 40 INJECTION SUBCUTANEOUS at 07:25

## 2025-02-21 RX ADMIN — AMLODIPINE BESYLATE 5 MG: 5 TABLET ORAL at 12:09

## 2025-02-21 SDOH — ECONOMIC STABILITY: INCOME INSECURITY: IN THE PAST 12 MONTHS HAS THE ELECTRIC, GAS, OIL, OR WATER COMPANY THREATENED TO SHUT OFF SERVICES IN YOUR HOME?: NO

## 2025-02-21 SDOH — SOCIAL STABILITY: SOCIAL INSECURITY: HAVE YOU HAD THOUGHTS OF HARMING ANYONE ELSE?: NO

## 2025-02-21 SDOH — SOCIAL STABILITY: SOCIAL INSECURITY: WITHIN THE LAST YEAR, HAVE YOU BEEN HUMILIATED OR EMOTIONALLY ABUSED IN OTHER WAYS BY YOUR PARTNER OR EX-PARTNER?: NO

## 2025-02-21 SDOH — ECONOMIC STABILITY: FOOD INSECURITY: WITHIN THE PAST 12 MONTHS, THE FOOD YOU BOUGHT JUST DIDN'T LAST AND YOU DIDN'T HAVE MONEY TO GET MORE.: NEVER TRUE

## 2025-02-21 SDOH — SOCIAL STABILITY: SOCIAL INSECURITY: WERE YOU ABLE TO COMPLETE ALL THE BEHAVIORAL HEALTH SCREENINGS?: YES

## 2025-02-21 SDOH — ECONOMIC STABILITY: FOOD INSECURITY: WITHIN THE PAST 12 MONTHS, YOU WORRIED THAT YOUR FOOD WOULD RUN OUT BEFORE YOU GOT THE MONEY TO BUY MORE.: NEVER TRUE

## 2025-02-21 SDOH — SOCIAL STABILITY: SOCIAL INSECURITY: WITHIN THE LAST YEAR, HAVE YOU BEEN AFRAID OF YOUR PARTNER OR EX-PARTNER?: NO

## 2025-02-21 ASSESSMENT — ACTIVITIES OF DAILY LIVING (ADL)
GROOMING: INDEPENDENT
DRESSING YOURSELF: INDEPENDENT
PATIENT'S MEMORY ADEQUATE TO SAFELY COMPLETE DAILY ACTIVITIES?: YES
FEEDING YOURSELF: INDEPENDENT
WALKS IN HOME: INDEPENDENT
BATHING: INDEPENDENT
ADEQUATE_TO_COMPLETE_ADL: YES
HEARING - RIGHT EAR: FUNCTIONAL
TOILETING: INDEPENDENT
HEARING - LEFT EAR: FUNCTIONAL
JUDGMENT_ADEQUATE_SAFELY_COMPLETE_DAILY_ACTIVITIES: YES
LACK_OF_TRANSPORTATION: NO

## 2025-02-21 ASSESSMENT — PATIENT HEALTH QUESTIONNAIRE - PHQ9
1. LITTLE INTEREST OR PLEASURE IN DOING THINGS: NOT AT ALL
SUM OF ALL RESPONSES TO PHQ9 QUESTIONS 1 & 2: 0
2. FEELING DOWN, DEPRESSED OR HOPELESS: NOT AT ALL

## 2025-02-21 ASSESSMENT — COGNITIVE AND FUNCTIONAL STATUS - GENERAL
DAILY ACTIVITIY SCORE: 24
PATIENT BASELINE BEDBOUND: NO
DAILY ACTIVITIY SCORE: 24
MOBILITY SCORE: 24
MOBILITY SCORE: 24

## 2025-02-21 ASSESSMENT — PAIN SCALES - GENERAL
PAINLEVEL_OUTOF10: 0 - NO PAIN
PAINLEVEL_OUTOF10: 4
PAINLEVEL_OUTOF10: 6
PAINLEVEL_OUTOF10: 2
PAINLEVEL_OUTOF10: 2
PAINLEVEL_OUTOF10: 6

## 2025-02-21 ASSESSMENT — PAIN DESCRIPTION - ORIENTATION: ORIENTATION: LEFT

## 2025-02-21 ASSESSMENT — LIFESTYLE VARIABLES
AUDIT-C TOTAL SCORE: 0
HOW OFTEN DO YOU HAVE A DRINK CONTAINING ALCOHOL: NEVER
HOW OFTEN DO YOU HAVE 6 OR MORE DRINKS ON ONE OCCASION: NEVER
SKIP TO QUESTIONS 9-10: 1
AUDIT-C TOTAL SCORE: 0
HOW MANY STANDARD DRINKS CONTAINING ALCOHOL DO YOU HAVE ON A TYPICAL DAY: PATIENT DOES NOT DRINK

## 2025-02-21 ASSESSMENT — PAIN - FUNCTIONAL ASSESSMENT
PAIN_FUNCTIONAL_ASSESSMENT: 0-10

## 2025-02-21 ASSESSMENT — PAIN DESCRIPTION - DESCRIPTORS: DESCRIPTORS: ACHING

## 2025-02-21 ASSESSMENT — PAIN DESCRIPTION - LOCATION: LOCATION: RIB CAGE

## 2025-02-21 NOTE — PROGRESS NOTES
Pharmacy Medication History     Source of Information: Patient    Additional concerns with the patient's PTA list.   Patient said she would like more nitroglycerin, she could probably use some more at home. She reports she's supposed to decrease her amiodarone to 200 mg once daily.   The following updates were made to the Prior to Admission medication list:     Medications ADDED:   N/A  Medications CHANGED:  N/A  Medications REMOVED:   Primrose oil, b12, multivitamin  Medications NOT TAKING:   N/A    Allergy reviewed : Yes    Meds 2 Beds : Yes    Outpatient pharmacy confirmed and updated in chart : Yes    [facility]: [facility]    The list below reflectives the updated PTA list. Please review each medication in order reconciliation for additional clarification and justification.    Prior to Admission Medications   Prescriptions   acetaminophen (Tylenol) 325 mg tablet   Sig: Take 2 tablets (650 mg) by mouth every 6 hours if needed for mild pain (1 - 3) (surgical pain).   amiodarone (Pacerone) 400 mg tablet   Sig: Take 1 tablet (400 mg) by mouth 2 times a day for 6 days, THEN 0.5 tablets (200 mg) once daily.   aspirin 81 mg chewable tablet   Sig: Chew 1 tablet (81 mg) once daily.   carvedilol (Coreg) 6.25 mg tablet   Sig: Take 1 tablet (6.25 mg) by mouth 2 times a day.   clopidogrel (Plavix) 75 mg tablet   Sig: Take 1 tablet (75 mg) by mouth once daily.   colchicine 0.6 mg tablet   Sig: Take 0.5 tablets (0.3 mg) by mouth once daily.   estradiol (Estrace) 0.01 % (0.1 mg/gram) vaginal cream   Sig: Insert into the vagina 3 times a week.   levothyroxine (Synthroid, Levoxyl) 125 mcg tablet   Sig: Take 1 tablet (125 mcg) by mouth once daily.   nitrofurantoin (Macrodantin) 50 mg capsule   Si capsule by Enteral route every other day.   nitroglycerin (Nitrostat) 0.3 mg SL tablet   Sig: Place 1 tablet (0.3 mg) under the tongue every 5 minutes if needed for chest pain (up to 3).   polyethylene glycol (Glycolax,  Miralax) 17 gram packet   Sig: Take 17 g by mouth once daily as needed (constipation).   rosuvastatin (Crestor) 40 mg tablet   Sig: Take 1 tablet (40 mg) by mouth once daily.      Facility-Administered Medications: None     02/21/25 at 11:57 AM - ALLEGRA SAUL

## 2025-02-21 NOTE — H&P
History Of Present Illness  Tracy Santoyo is a 74 y.o. female presenting with radiating chest pain that started last evening.  Heart cath in January was done which revealed double vessel disease with recommendations for CABG.  Patient underwent robotic assisted minimally invasive CABG on 2/12/2025 with notes that she still needs stenting for circumflex lesion.  She took 3 nitro prior to arrival and rates pain 6 out of 10.  Was given 324 mg of aspirin in the ED as well as additional nitro.  Cardiac workup was unremarkable.  Given history, she is being admitted for cardiology consultation.     Past Medical History  Past Medical History:   Diagnosis Date    Agatston coronary artery calcium score between 200 and 399 02/12/2024    PMY=705 (3/21/23)    Angina pectoris     treated with anti-anginal medical therapy    Chronic sinusitis     Coronary artery disease involving native coronary artery of native heart without angina pectoris 04/02/2024    [facility] (3/15/24) 70% mid LAD, 70% OM, 80% D1    JOVEL (dyspnea on exertion) 01/15/2025    Encounter for fitting and adjustment of other specified devices 08/23/2022    Fitting and adjustment of pessary    Encounter for fitting and adjustment of other specified devices 12/02/2019    Fitting and adjustment of pessary    Essential hypertension 04/02/2024    Hypercholesteremia 04/02/2024    Hypertension     Hypothyroidism     Other chest pain 02/12/2024    PAC (premature atrial contraction) 01/15/2025    Presence of urogenital implants 09/06/2022    Vaginal pessary present    Trigeminal neuralgia        Surgical History  Past Surgical History:   Procedure Laterality Date    APPENDECTOMY  1950    BLADDER SUSPENSION  1994    CARDIAC CATHETERIZATION N/A 03/15/2024    Procedure: Left Heart Cath with Coronary Angiography and LV;  Surgeon: Wood Turner MD;  Location: [facility] Cardiac Cath Lab;  Service: Cardiovascular;  Laterality: N/A;    CARDIAC CATHETERIZATION N/A 01/24/2025    Procedure: Left  Heart Cath with Coronary Angiography and LV;  Surgeon: Wood Turner MD;  Location: [facility] Cardiac Cath Lab;  Service: Cardiovascular;  Laterality: N/A;    CHOLECYSTECTOMY  2010    COLONOSCOPY      CRANIOTOMY  2011    craniotomy with trigeminal nerve decompression    HYSTERECTOMY  1990    LIVER SURGERY  2010    resection of liver hemangioma    OTHER SURGICAL HISTORY  01/15/2019    Abdominoplasty    OTHER SURGICAL HISTORY  01/15/2019    Urethropexy    OTHER SURGICAL HISTORY  10/18/2022    Sacrospinous fixation of vaginal vault    SINUS SURGERY  2012        Social History  She reports that she has never smoked. She has never used smokeless tobacco. She reports that she does not drink alcohol and does not use drugs.    Family History  No family history on file.     Allergies  Carbamazepine, Adhesive tape-silicones, Dilaudid [hydromorphone], and Metoprolol tartrate    Review of Systems   All other systems reviewed and are negative.       Physical Exam  Constitutional:       General: She is not in acute distress.     Appearance: Normal appearance.   HENT:      Head: Normocephalic and atraumatic.      Nose: Nose normal.      Mouth/Throat:      Mouth: Mucous membranes are moist.      Pharynx: Oropharynx is clear.   Eyes:      Conjunctiva/sclera: Conjunctivae normal.      Pupils: Pupils are equal, round, and reactive to light.   Pulmonary:      Effort: Pulmonary effort is normal. No respiratory distress.   Musculoskeletal:         General: No swelling, deformity or signs of injury.      Cervical back: Normal range of motion and neck supple.   Skin:     General: Skin is warm and dry.   Neurological:      General: No focal deficit present.      Mental Status: She is alert and oriented to person, place, and time. Mental status is at baseline.   Psychiatric:         Attention and Perception: Attention and perception normal.         Mood and Affect: Mood normal.         Behavior: Behavior normal.         Judgment: Judgment normal.  "         Last Recorded Vitals  Blood pressure 147/73, pulse 70, temperature 36.7 °C (98 °F), temperature source Oral, resp. rate 17, height 1.676 m (5' 6\"), weight 69.4 kg (153 lb), SpO2 96%, not currently breastfeeding.    Relevant Results        Results for orders placed or performed during the hospital encounter of 02/21/25 (from the past 24 hours)   CBC and Auto Differential   Result Value Ref Range    WBC 10.2 4.4 - 11.3 x10*3/uL    nRBC 0.0 0.0 - 0.0 /100 WBCs    RBC 4.04 4.00 - 5.20 x10*6/uL    Hemoglobin 12.3 12.0 - 16.0 g/dL    Hematocrit 37.0 36.0 - 46.0 %    MCV 92 80 - 100 fL    MCH 30.4 26.0 - 34.0 pg    MCHC 33.2 32.0 - 36.0 g/dL    RDW 14.7 (H) 11.5 - 14.5 %    Platelets 385 150 - 450 x10*3/uL    Neutrophils % 57.4 40.0 - 80.0 %    Immature Granulocytes %, Automated 1.0 (H) 0.0 - 0.9 %    Lymphocytes % 25.2 13.0 - 44.0 %    Monocytes % 10.7 2.0 - 10.0 %    Eosinophils % 5.2 0.0 - 6.0 %    Basophils % 0.5 0.0 - 2.0 %    Neutrophils Absolute 5.88 (H) 1.60 - 5.50 x10*3/uL    Immature Granulocytes Absolute, Automated 0.10 0.00 - 0.50 x10*3/uL    Lymphocytes Absolute 2.58 0.80 - 3.00 x10*3/uL    Monocytes Absolute 1.10 (H) 0.05 - 0.80 x10*3/uL    Eosinophils Absolute 0.53 (H) 0.00 - 0.40 x10*3/uL    Basophils Absolute 0.05 0.00 - 0.10 x10*3/uL   Comprehensive metabolic panel   Result Value Ref Range    Glucose 87 74 - 99 mg/dL    Sodium 139 136 - 145 mmol/L    Potassium 4.2 3.5 - 5.3 mmol/L    Chloride 105 98 - 107 mmol/L    Bicarbonate 27 21 - 32 mmol/L    Anion Gap 11 10 - 20 mmol/L    Urea Nitrogen 11 6 - 23 mg/dL    Creatinine 0.83 0.50 - 1.05 mg/dL    eGFR 73 >60 mL/min/1.73m*2    Calcium 9.2 8.6 - 10.3 mg/dL    Albumin 4.0 3.4 - 5.0 g/dL    Alkaline Phosphatase 62 33 - 136 U/L    Total Protein 6.9 6.4 - 8.2 g/dL    AST 21 9 - 39 U/L    Bilirubin, Total 0.7 0.0 - 1.2 mg/dL    ALT 32 7 - 45 U/L   Lipase   Result Value Ref Range    Lipase 25 9 - 82 U/L   Troponin I, High Sensitivity, Initial   Result " Value Ref Range    Troponin I, High Sensitivity 5 0 - 13 ng/L   Troponin, High Sensitivity, 1 Hour   Result Value Ref Range    Troponin I, High Sensitivity 4 0 - 13 ng/L   Light Blue Top   Result Value Ref Range    Extra Tube Hold for add-ons.    Lavender Top   Result Value Ref Range    Extra Tube Hold for add-ons.      XR chest 1 view    Result Date: 2/21/2025  Interpreted By:  Gerald Ryan, STUDY: XR CHEST 1 VIEW;  2/21/2025 4:33 am   INDICATION: Signs/Symptoms:CP.     COMPARISON: 02/15/2025.   ACCESSION NUMBER(S): [id number]   ORDERING CLINICIAN: REGINE FERMIN   FINDINGS: AP radiograph of the chest was provided.       CARDIOMEDIASTINAL SILHOUETTE: Cardiac silhouette is enlarged, unchanged.   LUNGS: Platelike atelectasis in the lingula and left lung base. Lungs otherwise appear clear. No pleural effusion or pneumothorax.   ABDOMEN: No remarkable upper abdominal findings.   BONES: No acute osseous changes.       1.  Platelike atelectasis in the lingula and left lung base. Lungs otherwise appear clear. No pleural effusion or pneumothorax.       MACRO: None   Signed by: Gerald Ryan 2/21/2025 5:08 AM Dictation workstation:   [id number]     Assessment/Plan   Assessment & Plan  Chest pain      Chest pain  With known circumflex lesion requiring future stenting  Will keep patient NPO at this time  Negative workup in the ED  Status post aspirin and nitro  Will consult cardiology for further recs  CAD  Continue dual antiplatelet therapy, beta-blocker, statin  A-fib  Continue amiodarone  Hypothyroidism  Continue Synthroid  Hypertension  Controlled with beta-blocker             Marie Cortés MD

## 2025-02-21 NOTE — CONSULTS
Inpatient consult to Cardiology  Consult performed by: JAYLAN Dimas-CNP  Consult ordered by: Marie Cortés MD  Reason for consult: Chest Pain        History Of Present Illness:    Outpatient cardiologist Dr. Wood Turner  Tracy Santoyo is a 74 y.o. female with a past medical history of CAD s/p robotic MIDCAB x 1(LIMA to LAD) 2/12/25, per [facility] (1/24/25) patient with remain disease: First diagonal 80% stenosis, OM1 80% stenosis, OM2 70% stenosis, post MID CAB procedure patient had brief Atrial Fibrillation in which she converted to SR 2/15/25. She was Amiodarone loaded Bolus/ IV 2/14/25 then started oral Amio  2/15/25 400 mg PO BID x 7 days, followed by 200 mg PO Daily to start 2/22/25. Other hx: hypertension, hyperlipidemia, hypothyroidism, trigeminal neuralgia.  Patient presents to [facility] with complaints of chest pain.  Cardiology consulted for further evaluation of chest pain, HS trop 5/4.    Patient here for further evaluation of chest pain.  She reports that she recently underwent robotic MIDCAB x 1 for severe LAD disease, she was discharged Monday, 2/17/2025 and was chest pain-free.  Tuesday she woke up with angina discomfort similar to her previous angina pain.  Discomfort described as left axillary pain/burning with radiation down left arm.  She had resolution of discomfort with 3 sublingual nitroglycerin.  She states that Tuesday through Thursday she had no recurrent pain.  Friday she was awakened with her angina discomfort, it was at its worse and she had no relief with 3 SLG ntg so she called 911 to be taken to  the ED for further evaluation.  She denies any associated symptoms of dyspnea, palpitations, nausea, vomiting, dizziness, lightheadedness, falls or syncope.  Currently, patient reports that angina discomfort is calm but still present.    In addition, patient reports compliance with all CV medications.  She states that after MIDCAB procedure she was not restarted on her Imdur.      ED  course:  Initial EKG: Normal sinus rhythm, nonspecific ST and T wave abnormality, HR 61  Arrival vital signs: Afebrile 98.0, HR 95, /77, 96% on room air  Current vital signs: /68, HR 61, 97% on room air  Pertinent imaging/Labs: HS trop 5/4, WBC 10.2, Hgb 12.3, , , K4.2, CR 0.83, mag 2.3, chest x-ray clear  ED medications: SL Nitroglycerine x 3 doses        Home CV meds:  Aspirin 81 mg p.o. daily, Plavix 75 mg p.o. daily, amiodarone 400 mg p.o. twice daily, Coreg 6.25 mg p.o. twice daily, Imdur 60 mg p.o. daily(not restarted after midcab), rosuvastatin 40 mg p.o. daily, nitroglycerin as needed      All other systems reviewed and negative unless as mentioned in HPI.       Past Medical/ Surgical History:  CAD s/p robotic MIDCAB x 1(LIMA to LAD) 2/12/25  Post MID CAB procedure patient had brief Atrial Fibrillation in which she converted to SR 2/15/25. She was Amiodarone loaded Bolus/ IV 2/14/25 then started oral Amio  2/15/25 400 mg PO BID x 7 days, followed by 200 mg PO Daily to start 2/22/25  Hypertension  Hyperlipidemia  Hypothyroidism  Trigeminal neuralgia    Social History:  Denies smoking, alcohol or illicit drug use     Family History:  Reviewed, not pertinent to presenting problem        Past Cardiology Tests (Last 3 Years):    CABG x1 robot-assisted LIMA to LAD mid Operative note 2/12/2025  Surgeon Dr. Edgar Schmitt    Findings: 1.There were no pericardial adhesions or effusions.   2. The ascending aorta was soft without evidence of atherosclerosis.   3. The heart was normal sinus and demonstrated normal left ventricular function.    4.The patient had severe diffuse coronary artery disease, however we were able to find locations on the LAD that was suitable for grafting.  5.  The conduits were suitable for grafting.  6.  The flow of the graft after the protamine were acceptable.  7.  Previous saphenous vein bilateral stripping.  The left radial was not suitable for CABG      LHC:  1/24/25  Indication Chest pain:  Coronary Angiography:  The coronary circulation is right dominant.     Left Main Coronary Artery:  The left main coronary artery is a normal caliber vessel. The left main arises normally from the left coronary sinus of Valsalva and bifurcates into the LAD and circumflex coronary arteries. The left main coronary artery showed no significant disease or stenosis greater than 30%.     Left Anterior Descending Coronary Artery Distribution:  The left anterior descending coronary artery is a normal caliber vessel. The LAD arises normally from the left main coronary artery. The mid left anterior descending coronary artery showed 70% stenosis. This lesion was long and calcified. An additional lesion, located at the distal left anterior descending coronary artery, revealed 80% stenosis. The mid 1st diagonal branch revealed 80% stenosis.     Circumflex Coronary Artery Distribution:  The circumflex coronary artery is a normal caliber vessel. The circumflex arises normally from the left main coronary artery and terminates in the AV groove. The circumflex revealed no significant disease or stenosis greater than 30%. The mid 1st obtuse marginal branch showed 80% stenosis. The mid 2nd obtuse marginal branch demonstrated 70% stenosis.     Right Coronary Artery Distribution:     The right coronary artery is a normal caliber vessel. The RCA arises normally from the right sinus of Valsalva. The RCA showed no significant disease or stenosis greater than 30%.     Coronary Lesion Summary:  Vessel         Stenosis   Vessel Segment  LAD          70% stenosis      mid  LAD          80% stenosis     distal  1st Diagonal 80% stenosis      mid  OM 1         80% stenosis      mid  OM 2         70% stenosis      mid    Echocardiogram 1/15/25:  CONCLUSIONS:   1. The left ventricular systolic function is normal, with a Buckley's biplane calculated ejection fraction of 66%.   2. Spectral Doppler shows a Grade I  (impaired relaxation pattern) of left ventricular diastolic filling with normal left atrial filling pressure.   3. There is normal right ventricular global systolic function.   4. Aortic valve sclerosis.    [facility] 3/19/25:  Indication Angina:  Conclusion:  1. Single vessel disease.  Recommendations:  Maximize medical therapy.  Agressive risk factor modification efforts.  Severe long mid-LAD and moderate branch vessel disease.         Allergies:  Carbamazepine, Adhesive tape-silicones, Dilaudid [hydromorphone], and Metoprolol tartrate    ROS:  10 point review of systems including (Constitutional, Eyes, ENMT, Respiratory, Cardiac, Gastrointestinal, Neurological, Psychiatric, and Hematologic) was performed and is otherwise negative.    Objective Data:  Last Recorded Vitals:  Vitals:    25 0545 25 0555 25 0600 25 0645   BP: 147/73 147/73  148/68   BP Location:    Right arm   Patient Position:    Lying   Pulse: 62 71 70 61   Resp: 20  17 16   Temp:    36.5 °C (97.7 °F)   TempSrc:    Temporal   SpO2: 96%  96% 97%   Weight:       Height:         Medical Gas Therapy: None (Room air)  Weight  Av.4 kg (153 lb)  Min: 69.4 kg (153 lb)  Max: 69.4 kg (153 lb)      LABS:  CMP:  Results from last 7 days   Lab Units 25  0420 25  1551 25  0644 25  0606 02/15/25  0733   [facility] SODIUM mmol/L  --  141  --   --   --    SODIUM mmol/L 139  --  142 140 142   [facility] POTASSIUM mmol/L  --  4.4  --   --   --    POTASSIUM mmol/L 4.2  --  3.6 3.5 3.5   [facility] CHLORIDE mmol/L  --  104  --   --   --    CHLORIDE mmol/L 105  --  106 105 107   [facility] CO2 mmol/L  --  26  --   --   --    CO2 mmol/L 27  --  27 25 27   ANION GAP mmol/L 11  --  13 14 12   BUN mg/dL 11  --  12 12 11   [facility] BUN mg/dL  --  16  --   --   --    CREATININE mg/dL 0.83  --  0.64 0.58 0.66   [facility] CREATININE mg/dL  --  0.71  --   --   --    [facility] EGFR mL/min/1.73m2  --  88  --   --   --    EGFR mL/min/1.73m*2 73  --  >90 >90 >90  "  [facility] MAGNESIUM mg/dL  --  2.3  --   --   --    MAGNESIUM mg/dL  --   --  1.96 2.15 2.20   [facility] ALBUMIN g/dL  --  4.2  --   --   --    ALBUMIN g/dL 4.0  --  3.4 3.4 3.6   ALT U/L 32  --   --   --   --    AST U/L 21  --   --   --   --    BILIRUBIN TOTAL mg/dL 0.7  --   --   --   --    LIPASE U/L 25  --   --   --   --      CBC:  Results from last 7 days   Lab Units 02/21/25  0420 02/18/25  1551 02/17/25  0644 02/16/25  0606 02/15/25  0733   WBC AUTO x10*3/uL 10.2  --  8.7 7.9 9.7   [facility] WBC AUTO Thousand/uL  --  8.7  --   --   --    HEMOGLOBIN g/dL 12.3  --  11.1* 11.3* 12.1   [facility] HEMOGLOBIN g/dL  --  12.7  --   --   --    HEMATOCRIT % 37.0  --  34.6* 31.7* 34.0*   [facility] HEMATOCRIT %  --  38.9  --   --   --    PLATELETS AUTO x10*3/uL 385  --  243 198 196   [facility] PLATELETS AUTO Thousand/uL  --  293  --   --   --    MCV fL 92  --  94 85 86   [facility] MCV fL  --  92.6  --   --   --      COAG:     ABO: No results found for: \"ABO\"  HEME/ENDO:     CARDIAC:   Results from last 7 days   Lab Units 02/21/25  0452 02/21/25  0420   TROPHS ng/L 4 5              Inpatient Medications:  Scheduled medications   Medication Dose Route Frequency    amiodarone  400 mg oral BID    Followed by    [START ON 2/23/2025] amiodarone  200 mg oral Daily    [START ON 2/22/2025] aspirin  81 mg oral Daily    carvedilol  6.25 mg oral BID    clopidogrel  75 mg oral Daily    colchicine  0.3 mg oral Daily    cyanocobalamin  500 mcg oral Weekly    enoxaparin  40 mg subcutaneous q24h    levothyroxine  125 mcg oral Daily    polyethylene glycol  17 g oral Daily    rosuvastatin  40 mg oral Daily     PRN medications   Medication    acetaminophen    benzocaine-menthol    lubricating eye drops    melatonin    ondansetron    simethicone    sodium chloride     Continuous Medications   Medication Dose Last Rate       Inpatient Medications:  Scheduled medications   Medication Dose Route Frequency    amiodarone  400 mg oral BID    Followed by    [START ON " 2/23/2025] amiodarone  200 mg oral Daily    [START ON 2/22/2025] aspirin  81 mg oral Daily    carvedilol  6.25 mg oral BID    clopidogrel  75 mg oral Daily    colchicine  0.3 mg oral Daily    cyanocobalamin  500 mcg oral Weekly    enoxaparin  40 mg subcutaneous q24h    levothyroxine  125 mcg oral Daily    polyethylene glycol  17 g oral Daily    rosuvastatin  40 mg oral Daily     PRN medications   Medication    acetaminophen    benzocaine-menthol    lubricating eye drops    melatonin    ondansetron    simethicone    sodium chloride     Continuous Medications   Medication Dose Last Rate     Outpatient Medications:  Current Outpatient Medications   Medication Instructions    acetaminophen (TYLENOL) 650 mg, oral, Every 6 hours PRN    amiodarone (Pacerone) 400 mg tablet Take 1 tablet (400 mg) by mouth 2 times a day for 6 days, THEN 0.5 tablets (200 mg) once daily.    aspirin 81 mg, oral, Daily    carvedilol (COREG) 6.25 mg, oral, 2 times daily    clopidogrel (PLAVIX) 75 mg, oral, Daily    clopidogrel (PLAVIX) 75 mg, oral, Daily    colchicine 0.3 mg, oral, Daily    cyanocobalamin (Vitamin B-12) 500 mcg tablet 1 tablet, oral, Weekly    estradiol (Estrace) 0.01 % (0.1 mg/gram) vaginal cream vaginal, 3 times weekly    evening primrose oil 500 mg 1 capsule, oral, 2 times weekly    levothyroxine (SYNTHROID, LEVOXYL) 125 mcg, Daily RT    multivitamin tablet 1 tablet, 3 times weekly    nitrofurantoin (Macrodantin) 50 mg capsule 1 capsule, Enteral, Every other day    nitroglycerin (NITROSTAT) 0.3 mg, sublingual, Every 5 min PRN    polyethylene glycol (GLYCOLAX, MIRALAX) 17 g, oral, Daily PRN    rosuvastatin (CRESTOR) 40 mg, oral, Daily       Physical Exam:  General: Elderly female, alert and oriented, NAD  HEENT:  Pupils equal and reactive.  Normocephalic.  Moist mucosa.    Neck:  No thyromegaly.  Normal Jugular Venous Pressure.  Cardiovascular:  Regular rate and rhythm. No cardiac Murmurs.  Normal S1 and S2.  Pulmonary:  Clear  to auscultation bilaterally.   Abdomen:  Soft. Non-tender.   Non-distended.  Positive bowel sounds.  Lower Extremities:  2+ pedal pulses. No LE edema.  Neurologic:  Cranial nerves intact.  No focal deficit.   Skin: Skin warm and dry, normal skin turgor.   Psychiatric: Appropriate mood and behavior     Assessment/Plan   Outpatient cardiologist Dr. Wood Turner  Tracy Santoyo is a 74 y.o. female with a past medical history of CAD s/p robotic MIDCAB x 1(LIMA to LAD) 2/12/25, per [facility] (1/24/25) patient with remain disease: First diagonal 80% stenosis, OM1 80% stenosis, OM2 70% stenosis, post MID CAB procedure patient had brief Atrial Fibrillation in which she converted to SR 2/15/25. She was Amiodarone loaded Bolus/ IV 2/14/25 then started oral Amio  2/15/25 400 mg PO BID x 7 days, followed by 200 mg PO Daily to start 2/22/25. Other hx: hypertension, hyperlipidemia, hypothyroidism, trigeminal neuralgia.  [facility] of [facility].  Cardiology consulted for further evaluation of chest pain, HS trop 5/4.    Home CV meds:  Aspirin 81 mg p.o. daily, Plavix 75 mg p.o. daily, amiodarone 400 mg p.o. twice daily, Coreg 6.25 mg p.o. twice daily, Imdur 60 mg p.o. daily(not restarted after midcab), rosuvastatin 40 mg p.o. daily, nitroglycerin as needed    I reviewed all EKGs, labs and imaging reports      1.  Unstable angina.  Known CAD, recent MIDCAB (LIMA to LAD) 2/12/2025  With remaining disease to the first diagonal, OM1, OM 2  Presents with angina pain unrelieved with nitroglycerin (left axillary burning pain with radiation down left arm)  On outpatient aspirin, Plavix, Coreg, rosuvastatin    2.  Post-op atrial fibrillation (noted 2/14/25 and resolved 2/15/2025). No recurrence noted thus far  Currently SR  Currently on amiodarone 400 mg p.o. twice daily x 7 days, then 200 mg p.o. daily to follow 2/22/25    3.  Hypertension.  Suboptimal.  Continuous monitoring    4.  Hyperlipidemia.  On outpatient  rosuvastatin  LDL 67 as of 9/27/2024        Recommendations:  Repeat limited echo ordered and pending; if no untoward findings, will resume Imdur 60 mg p.o. daily and add Ranexa 500 mg po BID  Further orders pend abnormal findings  C/w CV meds as ordered  Follow-up with patient's outpt cardiologist Dr. Wood Turner post-discharge    Code Status:  Full Code    I spent 60 minutes in the professional and overall care of this patient.        Mireille Cramer, APRN-CNP

## 2025-02-21 NOTE — PROGRESS NOTES
Mane Santoyo is a 74 y.o. female on day 0 of admission presenting with Chest pain.    Subjective   Patient returned from Cath Lab. Ultimately decided against cath today, and opted for medical management.   Amlodipine 5mg and Imdur 60mg initiated by Cardiology.  Currently with 3/10 chest pain  Patient verbalizing that she does not want to stay in hospital another night    Objective     Last Recorded Vitals  /79   Pulse 69   Temp 36.3 °C (97.3 °F) (Temporal)   Resp 16   Wt 69.4 kg (153 lb)   SpO2 96%   Intake/Output last 3 Shifts:  No intake or output data in the 24 hours ending 02/21/25 1113    Admission Weight  Weight: 69.4 kg (153 lb) (02/21/25 0342)    Daily Weight  02/21/25 : 69.4 kg (153 lb)    Image Results  Transthoracic Echo (TTE) [facility], [address] [place] [address]               Tel [phone] and Fax [phone]    TRANSTHORACIC ECHOCARDIOGRAM REPORT       Patient Name:       MANE SANTOYO      Reading Physician:   [id number] Clifford Mooney MD  Study Date:         2/21/2025           Ordering Provider:   [id number] MARY JO BURRIS  MRN/PID:            [id number]            Fellow:  Accession#:         [id number]        Nurse:  Date of Birth/Age:  1950 / 74     Sonographer:         Rosa Peraza RDCS                      years  Gender assigned at  F                   Additional Staff:  Birth:  Height:             167.64 cm           Admit Date:          2/21/2025  Weight:             69.40 kg            Admission Status:    Observation - STAT  BSA / BMI:          1.78 m2 / 24.70     Encounter#:          [id number]                      kg/m2  Blood Pressure:     141/68 mmHg         Department Location: [facility] Non                                                               Invasive    Study Type:    TRANSTHORACIC ECHO (TTE)  LIMITED  Diagnosis/ICD: Unstable angina-I20.0  Indication:    Unstable Angina  CPT Code:      Echo Limited-26632    Patient History:  CABG:              CABG x 1 on 2/12/2025.  Pertinent History: CAD, Chest Pain, HTN, Palpitations, Hyperlipidemia and                     Dyspnea.    Study Detail: The following Echo studies were performed: M-Mode, 2D, Doppler and                color flow.       PHYSICIAN INTERPRETATION:  Left Ventricle: Left ventricular ejection fraction is normal, calculated by Buckley's biplane at 64%. There are no regional left ventricular wall motion abnormalities. The left ventricular cavity size is normal. There is normal septal and normal posterior left ventricular wall thickness. Left ventricular diastolic filling is indeterminate.  Left Atrium: The left atrial size was not assessed.  Right Ventricle: The right ventricle was not assessed. There is normal right ventricular global systolic function.  Right Atrium: The right atrial size was not assessed.  Aortic Valve: The aortic valve is trileaflet. There is no evidence of aortic valve regurgitation.  Mitral Valve: The mitral valve is normal in structure. There is mild mitral valve regurgitation.  Tricuspid Valve: The tricuspid valve is structurally normal. There is mild tricuspid regurgitation. The Doppler estimated RVSP is within normal limits at 27.2 mmHg.  Pulmonic Valve: The pulmonic valve is structurally normal. There is no indication of pulmonic valve regurgitation.  Pericardium: There is no pericardial effusion noted.  Aorta: The aortic root was not assessed.  Systemic Veins: The inferior vena cava appears normal in size.  In comparison to the previous echocardiogram(s): Compared with study dated 1/15/2025, no significant change.       CONCLUSIONS:   1. Left ventricular ejection fraction is normal, calculated by Buckley's biplane at 64%.   2. Left ventricular diastolic filling is indeterminate.   3. There is normal right ventricular  global systolic function.   4. Right ventricular systolic pressure is within normal limits.    QUANTITATIVE DATA SUMMARY:     2D MEASUREMENTS:          Normal Ranges:  IVSd:            0.84 cm  (0.6-1.1cm)  LVPWd:           0.82 cm  (0.6-1.1cm)  LVIDd:           5.22 cm  (3.9-5.9cm)  LVIDs:           3.61 cm  LV Mass Index:   86 g/m2  LVEDV Index:     37 ml/m2  LV % FS          30.8 %       LV SYSTOLIC FUNCTION:                       Normal Ranges:  EF-A4C View:    66 % (>=55%)  EF-A2C View:    61 %  EF-Biplane:     64 %  LV EF Reported: 64 %       LV DIASTOLIC FUNCTION:             Normal Ranges:  MV Peak E:             0.56 m/s    (0.7-1.2 m/s)  MV Peak A:             0.74 m/s    (0.42-0.7 m/s)  E/A Ratio:             0.76        (1.0-2.2)  MV e'                  0.069 m/s   (>8.0)  MV lateral e'          0.08 m/s  MV medial e'           0.06 m/s  E/e' Ratio:            8.12        (<8.0)  a'                     0.09 m/s  PulmV Sys Julio:         38.60 cm/s  PulmV Mejía Julio:        32.10 cm/s  PulmV S/D Julio:         1.20  PulmV A Revs Julio:      20.60 cm/s  PulmV A Revs Dur:      127.00 msec       MITRAL VALVE:          Normal Ranges:  MV DT:        367 msec (150-240msec)       RIGHT VENTRICLE:  TAPSE: 16.5 mm  RV s'  0.10 m/s       TRICUSPID VALVE/RVSP:          Normal Ranges:  Peak TR Velocity:     2.46 m/s  Est. RA Pressure:     3 mmHg  RV Syst Pressure:     27 mmHg  (< 30mmHg)  IVC Diam:             1.20 cm       PULMONIC VALVE:          Normal Ranges:  PV Accel Time:  158 msec (>120ms)       PULMONARY VEINS:  PulmV A Revs Dur: 127.00 msec  PulmV A Revs Julio: 20.60 cm/s  PulmV Mejía Julio:   32.10 cm/s  PulmV S/D Julio:    1.20  PulmV Sys Julio:    38.60 cm/s       [id number] Clifford Mooney MD  Electronically signed on 2/21/2025 at 11:05:09 AM       ** Final **  ECG 12 lead  Normal sinus rhythm  Nonspecific ST and T wave abnormality  Abnormal ECG  When compared with ECG of 16-FEB-2025 10:01,  Nonspecific T wave abnormality now  evident in Inferior leads  XR chest 1 view  Narrative: Interpreted By:  Gerald Ryan,   STUDY:  XR CHEST 1 VIEW;  2/21/2025 4:33 am      INDICATION:  Signs/Symptoms:CP.          COMPARISON:  02/15/2025.      ACCESSION NUMBER(S):  [id number]      ORDERING CLINICIAN:  REGINE FERMIN      FINDINGS:  AP radiograph of the chest was provided.              CARDIOMEDIASTINAL SILHOUETTE:  Cardiac silhouette is enlarged, unchanged.      LUNGS:  Platelike atelectasis in the lingula and left lung base. Lungs  otherwise appear clear. No pleural effusion or pneumothorax.      ABDOMEN:  No remarkable upper abdominal findings.      BONES:  No acute osseous changes.      Impression: 1.  Platelike atelectasis in the lingula and left lung base. Lungs  otherwise appear clear. No pleural effusion or pneumothorax.              MACRO:  None      Signed by: Gerald Ryan 2/21/2025 5:08 AM  Dictation workstation:   [id number]      Physical Exam  Constitutional: NAD, pt alert and cooperative  Eyes: no icterus  ENMT: mucous membranes moist, no lesions seen  Head/Neck: Neck supple  Respiratory/Thorax: CTA bilaterally, non-labored breathing, no cough, on RA  Cardiovascular: Regular rate and rhythm, no murmurs heard  Gastrointestinal: Nondistended, soft, non-tender, BS present x 4  : no Grant, no SP discomfort  Musculoskeletal: ROM intact, no joint swelling  Extremities: normal extremities, no edema  Neurological: A&O x 3, speech clear, follows commands appropriately, cr. n. grossly intact, sensation grossly intact, motor 5/5 throughout  Skin: Warm and dry, no lesions, no rashes  Psych: calm, stable mood    Relevant Results  Scheduled medications  amiodarone, 400 mg, oral, BID   Followed by  [START ON 2/23/2025] amiodarone, 200 mg, oral, Daily  amLODIPine, 5 mg, oral, Daily  [START ON 2/22/2025] aspirin, 81 mg, oral, Daily  carvedilol, 6.25 mg, oral, BID  clopidogrel, 75 mg, oral, Daily  colchicine, 0.3 mg, oral, Daily  cyanocobalamin, 500  mcg, oral, Weekly  enoxaparin, 40 mg, subcutaneous, q24h  isosorbide mononitrate ER, 60 mg, oral, Daily  levothyroxine, 125 mcg, oral, Daily  perflutren lipid microspheres, 0.5-10 mL of dilution, intravenous, Once in imaging  perflutren protein A microsphere, 0.5 mL, intravenous, Once in imaging  polyethylene glycol, 17 g, oral, Daily  rosuvastatin, 40 mg, oral, Daily  sulfur hexafluoride microsphr, 2 mL, intravenous, Once in imaging      Continuous medications     PRN medications  PRN medications: acetaminophen, benzocaine-menthol, lubricating eye drops, melatonin, nitroglycerin, ondansetron, simethicone, sodium chloride  Results for orders placed or performed during the hospital encounter of 02/21/25 (from the past 24 hours)   CBC and Auto Differential   Result Value Ref Range    WBC 10.2 4.4 - 11.3 x10*3/uL    nRBC 0.0 0.0 - 0.0 /100 WBCs    RBC 4.04 4.00 - 5.20 x10*6/uL    Hemoglobin 12.3 12.0 - 16.0 g/dL    Hematocrit 37.0 36.0 - 46.0 %    MCV 92 80 - 100 fL    MCH 30.4 26.0 - 34.0 pg    MCHC 33.2 32.0 - 36.0 g/dL    RDW 14.7 (H) 11.5 - 14.5 %    Platelets 385 150 - 450 x10*3/uL    Neutrophils % 57.4 40.0 - 80.0 %    Immature Granulocytes %, Automated 1.0 (H) 0.0 - 0.9 %    Lymphocytes % 25.2 13.0 - 44.0 %    Monocytes % 10.7 2.0 - 10.0 %    Eosinophils % 5.2 0.0 - 6.0 %    Basophils % 0.5 0.0 - 2.0 %    Neutrophils Absolute 5.88 (H) 1.60 - 5.50 x10*3/uL    Immature Granulocytes Absolute, Automated 0.10 0.00 - 0.50 x10*3/uL    Lymphocytes Absolute 2.58 0.80 - 3.00 x10*3/uL    Monocytes Absolute 1.10 (H) 0.05 - 0.80 x10*3/uL    Eosinophils Absolute 0.53 (H) 0.00 - 0.40 x10*3/uL    Basophils Absolute 0.05 0.00 - 0.10 x10*3/uL   Comprehensive metabolic panel   Result Value Ref Range    Glucose 87 74 - 99 mg/dL    Sodium 139 136 - 145 mmol/L    Potassium 4.2 3.5 - 5.3 mmol/L    Chloride 105 98 - 107 mmol/L    Bicarbonate 27 21 - 32 mmol/L    Anion Gap 11 10 - 20 mmol/L    Urea Nitrogen 11 6 - 23 mg/dL    Creatinine  0.83 0.50 - 1.05 mg/dL    eGFR 73 >60 mL/min/1.73m*2    Calcium 9.2 8.6 - 10.3 mg/dL    Albumin 4.0 3.4 - 5.0 g/dL    Alkaline Phosphatase 62 33 - 136 U/L    Total Protein 6.9 6.4 - 8.2 g/dL    AST 21 9 - 39 U/L    Bilirubin, Total 0.7 0.0 - 1.2 mg/dL    ALT 32 7 - 45 U/L   Lipase   Result Value Ref Range    Lipase 25 9 - 82 U/L   Troponin I, High Sensitivity, Initial   Result Value Ref Range    Troponin I, High Sensitivity 5 0 - 13 ng/L   Troponin, High Sensitivity, 1 Hour   Result Value Ref Range    Troponin I, High Sensitivity 4 0 - 13 ng/L   Light Blue Top   Result Value Ref Range    Extra Tube Hold for add-ons.    Lavender Top   Result Value Ref Range    Extra Tube Hold for add-ons.    ECG 12 lead   Result Value Ref Range    Ventricular Rate 61 BPM    Atrial Rate 61 BPM    DE Interval 174 ms    QRS Duration 72 ms    QT Interval 424 ms    QTC Calculation(Bazett) 426 ms    P Axis 82 degrees    R Axis 11 degrees    T Axis 54 degrees    QRS Count 10 beats    Q Onset 227 ms    P Onset 140 ms    P Offset 178 ms    T Offset 439 ms    QTC Fredericia 426 ms   Transthoracic Echo (TTE) Limited   Result Value Ref Range    MV E/A ratio 0.76     Tricuspid annular plane systolic excursion 1.7 cm    LV EF 64 %    RV free wall pk S' 10.10 cm/s    LVIDd 5.22 cm    RVSP 27.2 mmHg    LV A4C EF 65.8      Transthoracic Echo (TTE) Limited    Result Date: 2/21/2025   [facility], [address] [place] [address]              Tel [phone] and Fax [phone] TRANSTHORACIC ECHOCARDIOGRAM REPORT  Patient Name:       MANE Holm Physician:   [id number] Clifford Mooney MD Study Date:         2/21/2025           Ordering Provider:   [id number] MARY JO BURRIS MRN/PID:            [id number]            Fellow: Accession#:         [id number]        Nurse: Date of Birth/Age:  1950 / 74      Sonographer:         Rosa Peraza RDCS                     years Gender assigned at  F                   Additional Staff: Birth: Height:             167.64 cm           Admit Date:          2/21/2025 Weight:             69.40 kg            Admission Status:    Observation - STAT BSA / BMI:          1.78 m2 / 24.70     Encounter#:          [id number]                     kg/m2 Blood Pressure:     141/68 mmHg         Department Location: [facility] Non                                                              Invasive Study Type:    TRANSTHORACIC ECHO (TTE) LIMITED Diagnosis/ICD: Unstable angina-I20.0 Indication:    Unstable Angina CPT Code:      Echo Limited-58705 Patient History: CABG:              CABG x 1 on 2/12/2025. Pertinent History: CAD, Chest Pain, HTN, Palpitations, Hyperlipidemia and                    Dyspnea. Study Detail: The following Echo studies were performed: M-Mode, 2D, Doppler and               color flow.  PHYSICIAN INTERPRETATION: Left Ventricle: Left ventricular ejection fraction is normal, calculated by Buckley's biplane at 64%. There are no regional left ventricular wall motion abnormalities. The left ventricular cavity size is normal. There is normal septal and normal posterior left ventricular wall thickness. Left ventricular diastolic filling is indeterminate. Left Atrium: The left atrial size was not assessed. Right Ventricle: The right ventricle was not assessed. There is normal right ventricular global systolic function. Right Atrium: The right atrial size was not assessed. Aortic Valve: The aortic valve is trileaflet. There is no evidence of aortic valve regurgitation. Mitral Valve: The mitral valve is normal in structure. There is mild mitral valve regurgitation. Tricuspid Valve: The tricuspid valve is structurally normal. There is mild tricuspid regurgitation. The Doppler estimated RVSP is within normal limits at 27.2 mmHg. Pulmonic Valve: The pulmonic valve is structurally  normal. There is no indication of pulmonic valve regurgitation. Pericardium: There is no pericardial effusion noted. Aorta: The aortic root was not assessed. Systemic Veins: The inferior vena cava appears normal in size. In comparison to the previous echocardiogram(s): Compared with study dated 1/15/2025, no significant change.  CONCLUSIONS:  1. Left ventricular ejection fraction is normal, calculated by Buckley's biplane at 64%.  2. Left ventricular diastolic filling is indeterminate.  3. There is normal right ventricular global systolic function.  4. Right ventricular systolic pressure is within normal limits. QUANTITATIVE DATA SUMMARY:  2D MEASUREMENTS:          Normal Ranges: IVSd:            0.84 cm  (0.6-1.1cm) LVPWd:           0.82 cm  (0.6-1.1cm) LVIDd:           5.22 cm  (3.9-5.9cm) LVIDs:           3.61 cm LV Mass Index:   86 g/m2 LVEDV Index:     37 ml/m2 LV % FS          30.8 %  LV SYSTOLIC FUNCTION:                      Normal Ranges: EF-A4C View:    66 % (>=55%) EF-A2C View:    61 % EF-Biplane:     64 % LV EF Reported: 64 %  LV DIASTOLIC FUNCTION:             Normal Ranges: MV Peak E:             0.56 m/s    (0.7-1.2 m/s) MV Peak A:             0.74 m/s    (0.42-0.7 m/s) E/A Ratio:             0.76        (1.0-2.2) MV e'                  0.069 m/s   (>8.0) MV lateral e'          0.08 m/s MV medial e'           0.06 m/s E/e' Ratio:            8.12        (<8.0) a'                     0.09 m/s PulmV Sys Julio:         38.60 cm/s PulmV Mejía Julio:        32.10 cm/s PulmV S/D Julio:         1.20 PulmV A Revs Julio:      20.60 cm/s PulmV A Revs Dur:      127.00 msec  MITRAL VALVE:          Normal Ranges: MV DT:        367 msec (150-240msec)  RIGHT VENTRICLE: TAPSE: 16.5 mm RV s'  0.10 m/s  TRICUSPID VALVE/RVSP:          Normal Ranges: Peak TR Velocity:     2.46 m/s Est. RA Pressure:     3 mmHg RV Syst Pressure:     27 mmHg  (< 30mmHg) IVC Diam:             1.20 cm  PULMONIC VALVE:          Normal Ranges: PV Accel  Time:  158 msec (>120ms)  PULMONARY VEINS: PulmV A Revs Dur: 127.00 msec PulmV A Revs Julio: 20.60 cm/s PulmV Mejía Julio:   32.10 cm/s PulmV S/D Julio:    1.20 PulmV Sys Julio:    38.60 cm/s  [id number] Clifford Mooney MD Electronically signed on 2/21/2025 at 11:05:09 AM  ** Final **     ECG 12 lead    Result Date: 2/21/2025  Normal sinus rhythm Nonspecific ST and T wave abnormality Abnormal ECG When compared with ECG of 16-FEB-2025 10:01, Nonspecific T wave abnormality now evident in Inferior leads    XR chest 1 view    Result Date: 2/21/2025  Interpreted By:  Gerald Ryan, STUDY: XR CHEST 1 VIEW;  2/21/2025 4:33 am   INDICATION: Signs/Symptoms:CP.     COMPARISON: 02/15/2025.   ACCESSION NUMBER(S): [id number]   ORDERING CLINICIAN: REGINE FERMIN   FINDINGS: AP radiograph of the chest was provided.       CARDIOMEDIASTINAL SILHOUETTE: Cardiac silhouette is enlarged, unchanged.   LUNGS: Platelike atelectasis in the lingula and left lung base. Lungs otherwise appear clear. No pleural effusion or pneumothorax.   ABDOMEN: No remarkable upper abdominal findings.   BONES: No acute osseous changes.       1.  Platelike atelectasis in the lingula and left lung base. Lungs otherwise appear clear. No pleural effusion or pneumothorax.       MACRO: None   Signed by: Gerald Ryan 2/21/2025 5:08 AM Dictation workstation:   [id number]     Assessment/Plan   Assessment & Plan  Chest pain    Angina pectoris, unstable (Multi)    Tracy Santoyo is a 74 y.o. female with a past medical history of CAD s/p robotic MIDCAB x 1(LIMA to LAD) 2/12/25, per [facility] (1/24/25), hypertension, hyperlipidemia, hypothyroidism, trigeminal neuralgia who presents with 3 day intermittent left-sided axillary pressure that worsens at night. She recently underwent robotic MIDCAB x 1 for severe LAD disease, she was discharged Monday, 2/17/2025 and was chest pain-free.     Chest Pain  -Troponin's negative, CP currently at 3/10  -EKG no ischemic, no changes noted  -Known CAD,  recent MIDCAB (LIMA to LAD) 2/12/2025  -On outpatient aspirin, Plavix, Coreg, rosuvastatin  -Ultimately decided against cath today, and opted for medical management.   -Started Amlodipine 5mg and Imdur 60mg   -Monitor on telemetry  -Cardiac Diet  -Follow up with Dr Turner in 1-2 weeks upon discharge    Post-op atrial fibrillation  -Currently SR on telemetry and EKG  -Currently on amiodarone 400 mg p.o. twice daily x 7 days, then 200 mg p.o. daily to follow 2/22/25     Hypertension  - stable, monitor with new agents      Hyperlipidemia  -stable; continue rosuvastatin    GI/VTE PPX: PPI, SQ lovenox  Code Status: Full Code    Chart, medical history, and labs/testing reviewed in detail.   Case and plan of care discussed with attending provider, Dr Boykin    Disposition: Discharge home once medically cleared and stable, pending symptom resolution     JAYLAN Sparks-CNP   Observation/Internal Med [facility]  [facility]  02/21/25  11:27 AM  Total time of 30 minutes spent on professional and overall care, with >50% of time dedicated to counseling/coordination of care.

## 2025-02-21 NOTE — H&P (VIEW-ONLY)
Inpatient consult to Cardiology  Consult performed by: JAYLAN Dimas-CNP  Consult ordered by: Marie Cortés MD  Reason for consult: Chest Pain        History Of Present Illness:    Outpatient cardiologist Dr. Wood Turner  Tracy Santoyo is a 74 y.o. female with a past medical history of CAD s/p robotic MIDCAB x 1(LIMA to LAD) 2/12/25, per [facility] (1/24/25) patient with remain disease: First diagonal 80% stenosis, OM1 80% stenosis, OM2 70% stenosis, post MID CAB procedure patient had brief Atrial Fibrillation in which she converted to SR 2/15/25. She was Amiodarone loaded Bolus/ IV 2/14/25 then started oral Amio  2/15/25 400 mg PO BID x 7 days, followed by 200 mg PO Daily to start 2/22/25. Other hx: hypertension, hyperlipidemia, hypothyroidism, trigeminal neuralgia.  [place] [facility] [place] of [place].  Cardiology consulted for further evaluation of chest pain, HS trop 5/4.    Patient here for further evaluation of chest pain.  She reports that she recently underwent robotic MIDCAB x 1 for severe LAD disease, she was discharged Monday, 2/17/2025 and was chest pain-free.  Tuesday she woke up with angina discomfort similar to her previous angina pain.  Discomfort described as left axillary pain/burning with radiation down left arm.  She had resolution of discomfort with 3 sublingual nitroglycerin.  She states that Tuesday through Thursday she had no recurrent pain.  Friday she was awakened with her angina discomfort, it was at its worse and she had no relief with 3 SLG ntg so she called 911 to be taken to  the ED for further evaluation.  She denies any associated symptoms of dyspnea, palpitations, nausea, vomiting, dizziness, lightheadedness, falls or syncope.  Currently, patient reports that angina discomfort is calm but still present.    In addition, patient reports compliance with all CV medications.  She states that after MIDCAB procedure she was not restarted on her Imdur.      ED  course:  Initial EKG: Normal sinus rhythm, nonspecific ST and T wave abnormality, HR 61  Arrival vital signs: Afebrile 98.0, HR 95, /77, 96% on room air  Current vital signs: /68, HR 61, 97% on room air  Pertinent imaging/Labs: HS trop 5/4, WBC 10.2, Hgb 12.3, , , K4.2, CR 0.83, mag 2.3, chest x-ray clear  ED medications: SL Nitroglycerine x 3 doses        Home CV meds:  Aspirin 81 mg p.o. daily, Plavix 75 mg p.o. daily, amiodarone 400 mg p.o. twice daily, Coreg 6.25 mg p.o. twice daily, Imdur 60 mg p.o. daily(not restarted after midcab), rosuvastatin 40 mg p.o. daily, nitroglycerin as needed      All other systems reviewed and negative unless as mentioned in HPI.       Past Medical/ Surgical History:  CAD s/p robotic MIDCAB x 1(LIMA to LAD) 2/12/25  Post MID CAB procedure patient had brief Atrial Fibrillation in which she converted to SR 2/15/25. She was Amiodarone loaded Bolus/ IV 2/14/25 then started oral Amio  2/15/25 400 mg PO BID x 7 days, followed by 200 mg PO Daily to start 2/22/25  Hypertension  Hyperlipidemia  Hypothyroidism  Trigeminal neuralgia    Social History:  Denies smoking, alcohol or illicit drug use     Family History:  Reviewed, not pertinent to presenting problem        Past Cardiology Tests (Last 3 Years):    CABG x1 robot-assisted LIMA to LAD mid Operative note 2/12/2025  Surgeon Dr. Edgar Schmitt    Findings: 1.There were no pericardial adhesions or effusions.   2. The ascending aorta was soft without evidence of atherosclerosis.   3. The heart was normal sinus and demonstrated normal left ventricular function.    4.The patient had severe diffuse coronary artery disease, however we were able to find locations on the LAD that was suitable for grafting.  5.  The conduits were suitable for grafting.  6.  The flow of the graft after the protamine were acceptable.  7.  Previous saphenous vein bilateral stripping.  The left radial was not suitable for CABG      LHC:  1/24/25  Indication Chest pain:  Coronary Angiography:  The coronary circulation is right dominant.     Left Main Coronary Artery:  The left main coronary artery is a normal caliber vessel. The left main arises normally from the left coronary sinus of Valsalva and bifurcates into the LAD and circumflex coronary arteries. The left main coronary artery showed no significant disease or stenosis greater than 30%.     Left Anterior Descending Coronary Artery Distribution:  The left anterior descending coronary artery is a normal caliber vessel. The LAD arises normally from the left main coronary artery. The mid left anterior descending coronary artery showed 70% stenosis. This lesion was long and calcified. An additional lesion, located at the distal left anterior descending coronary artery, revealed 80% stenosis. The mid 1st diagonal branch revealed 80% stenosis.     Circumflex Coronary Artery Distribution:  The circumflex coronary artery is a normal caliber vessel. The circumflex arises normally from the left main coronary artery and terminates in the AV groove. The circumflex revealed no significant disease or stenosis greater than 30%. The mid 1st obtuse marginal branch showed 80% stenosis. The mid 2nd obtuse marginal branch demonstrated 70% stenosis.     Right Coronary Artery Distribution:     The right coronary artery is a normal caliber vessel. The RCA arises normally from the right sinus of Valsalva. The RCA showed no significant disease or stenosis greater than 30%.     Coronary Lesion Summary:  Vessel         Stenosis   Vessel Segment  LAD          70% stenosis      mid  LAD          80% stenosis     distal  1st Diagonal 80% stenosis      mid  OM 1         80% stenosis      mid  OM 2         70% stenosis      mid    Echocardiogram 1/15/25:  CONCLUSIONS:   1. The left ventricular systolic function is normal, with a Buckley's biplane calculated ejection fraction of 66%.   2. Spectral Doppler shows a Grade I  (impaired relaxation pattern) of left ventricular diastolic filling with normal left atrial filling pressure.   3. There is normal right ventricular global systolic function.   4. Aortic valve sclerosis.    [facility] 3/19/25:  Indication Angina:  Conclusion:  1. Single vessel disease.  Recommendations:  Maximize medical therapy.  Agressive risk factor modification efforts.  Severe long mid-LAD and moderate branch vessel disease.         Allergies:  Carbamazepine, Adhesive tape-silicones, Dilaudid [hydromorphone], and Metoprolol tartrate    ROS:  10 point review of systems including (Constitutional, Eyes, ENMT, Respiratory, Cardiac, Gastrointestinal, Neurological, Psychiatric, and Hematologic) was performed and is otherwise negative.    Objective Data:  Last Recorded Vitals:  Vitals:    25 0545 25 0555 25 0600 25 0645   BP: 147/73 147/73  148/68   BP Location:    Right arm   Patient Position:    Lying   Pulse: 62 71 70 61   Resp: 20  17 16   Temp:    36.5 °C (97.7 °F)   TempSrc:    Temporal   SpO2: 96%  96% 97%   Weight:       Height:         Medical Gas Therapy: None (Room air)  Weight  Av.4 kg (153 lb)  Min: 69.4 kg (153 lb)  Max: 69.4 kg (153 lb)      LABS:  CMP:  Results from last 7 days   Lab Units 25  0420 25  1551 25  0644 25  0606 02/15/25  0733   [facility] SODIUM mmol/L  --  141  --   --   --    SODIUM mmol/L 139  --  142 140 142   [facility] POTASSIUM mmol/L  --  4.4  --   --   --    POTASSIUM mmol/L 4.2  --  3.6 3.5 3.5   [facility] CHLORIDE mmol/L  --  104  --   --   --    CHLORIDE mmol/L 105  --  106 105 107   [facility] CO2 mmol/L  --  26  --   --   --    CO2 mmol/L 27  --  27 25 27   ANION GAP mmol/L 11  --  13 14 12   BUN mg/dL 11  --  12 12 11   [facility] BUN mg/dL  --  16  --   --   --    CREATININE mg/dL 0.83  --  0.64 0.58 0.66   [facility] CREATININE mg/dL  --  0.71  --   --   --    [facility] EGFR mL/min/1.73m2  --  88  --   --   --    EGFR mL/min/1.73m*2 73  --  >90 >90 >90  "  [facility] MAGNESIUM mg/dL  --  2.3  --   --   --    MAGNESIUM mg/dL  --   --  1.96 2.15 2.20   [facility] ALBUMIN g/dL  --  4.2  --   --   --    ALBUMIN g/dL 4.0  --  3.4 3.4 3.6   ALT U/L 32  --   --   --   --    AST U/L 21  --   --   --   --    BILIRUBIN TOTAL mg/dL 0.7  --   --   --   --    LIPASE U/L 25  --   --   --   --      CBC:  Results from last 7 days   Lab Units 02/21/25  0420 02/18/25  1551 02/17/25  0644 02/16/25  0606 02/15/25  0733   WBC AUTO x10*3/uL 10.2  --  8.7 7.9 9.7   [facility] WBC AUTO Thousand/uL  --  8.7  --   --   --    HEMOGLOBIN g/dL 12.3  --  11.1* 11.3* 12.1   [facility] HEMOGLOBIN g/dL  --  12.7  --   --   --    HEMATOCRIT % 37.0  --  34.6* 31.7* 34.0*   [facility] HEMATOCRIT %  --  38.9  --   --   --    PLATELETS AUTO x10*3/uL 385  --  243 198 196   [facility] PLATELETS AUTO Thousand/uL  --  293  --   --   --    MCV fL 92  --  94 85 86   [facility] MCV fL  --  92.6  --   --   --      COAG:     ABO: No results found for: \"ABO\"  HEME/ENDO:     CARDIAC:   Results from last 7 days   Lab Units 02/21/25  0452 02/21/25  0420   TROPHS ng/L 4 5              Inpatient Medications:  Scheduled medications   Medication Dose Route Frequency    amiodarone  400 mg oral BID    Followed by    [START ON 2/23/2025] amiodarone  200 mg oral Daily    [START ON 2/22/2025] aspirin  81 mg oral Daily    carvedilol  6.25 mg oral BID    clopidogrel  75 mg oral Daily    colchicine  0.3 mg oral Daily    cyanocobalamin  500 mcg oral Weekly    enoxaparin  40 mg subcutaneous q24h    levothyroxine  125 mcg oral Daily    polyethylene glycol  17 g oral Daily    rosuvastatin  40 mg oral Daily     PRN medications   Medication    acetaminophen    benzocaine-menthol    lubricating eye drops    melatonin    ondansetron    simethicone    sodium chloride     Continuous Medications   Medication Dose Last Rate       Inpatient Medications:  Scheduled medications   Medication Dose Route Frequency    amiodarone  400 mg oral BID    Followed by    [START ON " 2/23/2025] amiodarone  200 mg oral Daily    [START ON 2/22/2025] aspirin  81 mg oral Daily    carvedilol  6.25 mg oral BID    clopidogrel  75 mg oral Daily    colchicine  0.3 mg oral Daily    cyanocobalamin  500 mcg oral Weekly    enoxaparin  40 mg subcutaneous q24h    levothyroxine  125 mcg oral Daily    polyethylene glycol  17 g oral Daily    rosuvastatin  40 mg oral Daily     PRN medications   Medication    acetaminophen    benzocaine-menthol    lubricating eye drops    melatonin    ondansetron    simethicone    sodium chloride     Continuous Medications   Medication Dose Last Rate     Outpatient Medications:  Current Outpatient Medications   Medication Instructions    acetaminophen (TYLENOL) 650 mg, oral, Every 6 hours PRN    amiodarone (Pacerone) 400 mg tablet Take 1 tablet (400 mg) by mouth 2 times a day for 6 days, THEN 0.5 tablets (200 mg) once daily.    aspirin 81 mg, oral, Daily    carvedilol (COREG) 6.25 mg, oral, 2 times daily    clopidogrel (PLAVIX) 75 mg, oral, Daily    clopidogrel (PLAVIX) 75 mg, oral, Daily    colchicine 0.3 mg, oral, Daily    cyanocobalamin (Vitamin B-12) 500 mcg tablet 1 tablet, oral, Weekly    estradiol (Estrace) 0.01 % (0.1 mg/gram) vaginal cream vaginal, 3 times weekly    evening primrose oil 500 mg 1 capsule, oral, 2 times weekly    levothyroxine (SYNTHROID, LEVOXYL) 125 mcg, Daily RT    multivitamin tablet 1 tablet, 3 times weekly    nitrofurantoin (Macrodantin) 50 mg capsule 1 capsule, Enteral, Every other day    nitroglycerin (NITROSTAT) 0.3 mg, sublingual, Every 5 min PRN    polyethylene glycol (GLYCOLAX, MIRALAX) 17 g, oral, Daily PRN    rosuvastatin (CRESTOR) 40 mg, oral, Daily       Physical Exam:  General: Elderly female, alert and oriented, NAD  HEENT:  Pupils equal and reactive.  Normocephalic.  Moist mucosa.    Neck:  No thyromegaly.  Normal Jugular Venous Pressure.  Cardiovascular:  Regular rate and rhythm. No cardiac Murmurs.  Normal S1 and S2.  Pulmonary:  Clear  to auscultation bilaterally.   Abdomen:  Soft. Non-tender.   Non-distended.  Positive bowel sounds.  Lower Extremities:  2+ pedal pulses. No LE edema.  Neurologic:  Cranial nerves intact.  No focal deficit.   Skin: Skin warm and dry, normal skin turgor.   Psychiatric: Appropriate mood and behavior     Assessment/Plan   Outpatient cardiologist Dr. Wood Turner  Tracy Santoyo is a 74 y.o. female with a past medical history of CAD s/p robotic MIDCAB x 1(LIMA to LAD) 2/12/25, per [facility] (1/24/25) patient with remain disease: First diagonal 80% stenosis, OM1 80% stenosis, OM2 70% stenosis, post MID CAB procedure patient had brief Atrial Fibrillation in which she converted to SR 2/15/25. She was Amiodarone loaded Bolus/ IV 2/14/25 then started oral Amio  2/15/25 400 mg PO BID x 7 days, followed by 200 mg PO Daily to start 2/22/25. Other hx: hypertension, hyperlipidemia, hypothyroidism, trigeminal neuralgia.  Patient presents to [facility] [place] with complaints of chest pain.  Cardiology consulted for further evaluation of chest pain, HS trop 5/4.    Home CV meds:  Aspirin 81 mg p.o. daily, Plavix 75 mg p.o. daily, amiodarone 400 mg p.o. twice daily, Coreg 6.25 mg p.o. twice daily, Imdur 60 mg p.o. daily(not restarted after midcab), rosuvastatin 40 mg p.o. daily, nitroglycerin as needed    I reviewed all EKGs, labs and imaging reports      1.  Unstable angina.  Known CAD, recent MIDCAB (LIMA to LAD) 2/12/2025  With remaining disease to the first diagonal, OM1, OM 2  Presents with angina pain unrelieved with nitroglycerin (left axillary burning pain with radiation down left arm)  On outpatient aspirin, Plavix, Coreg, rosuvastatin    2.  Post-op atrial fibrillation (noted 2/14/25 and resolved 2/15/2025). No recurrence noted thus far  Currently SR  Currently on amiodarone 400 mg p.o. twice daily x 7 days, then 200 mg p.o. daily to follow 2/22/25    3.  Hypertension.  Suboptimal.  Continuous monitoring    4.  Hyperlipidemia.  On outpatient  rosuvastatin  LDL 67 as of 9/27/2024        Recommendations:  Repeat limited echo ordered and pending; if no untoward findings, will resume Imdur 60 mg p.o. daily and add Ranexa 500 mg po BID  Further orders pend abnormal findings  C/w CV meds as ordered  Follow-up with patient's outpt cardiologist Dr. Wood Turner post-discharge    Code Status:  Full Code    I spent 60 minutes in the professional and overall care of this patient.        Mireille Cramer, APRN-CNP

## 2025-02-21 NOTE — NURSING NOTE
MD speaks with patient and family; patient opts out of cath at this time and will continue with medical management; patient returned to floor

## 2025-02-21 NOTE — PRE-PROCEDURE NOTE
The patient was referred for a left heart catheterization.  She has a 2-year history of intermittent left axillary pressure that occurs mostly at night.  She underwent a MIDCAB to the LAD last week.  For the last 3 nights she has had intermittent left-sided axillary pressure similar to her prior symptoms.  Last night it started at 11 PM and has remained continuous since that time.  It has decreased in severity however.  She had a negative high-sensitivity troponin despite continuous pain for the preceding 5 to 6 hours.  There are no changes in her EKG.  Her echo shows normal LV function.  I explained the risks, benefits, and alternatives of cardiac catheterization to the patient.  She declined to have the procedure.  She wishes to try a course of medical therapy.

## 2025-02-21 NOTE — ED TRIAGE NOTES
The pt came to the ed with chest pain she had open heart on 2/12/25 LAD was blocked she is having radiating chest pain that started @ 2300 she takes asprin daily she took 3 nirto pain is at at 6.

## 2025-02-21 NOTE — ED PROVIDER NOTES
HPI   No chief complaint on file.        History provided by:  Patient   used: No            Patient History   Past Medical History:   Diagnosis Date   • Agatston coronary artery calcium score between 200 and 399 02/12/2024    BMD=078 (3/21/23)   • Angina pectoris     treated with anti-anginal medical therapy   • Chronic sinusitis    • Coronary artery disease involving native coronary artery of native heart without angina pectoris 04/02/2024    [facility] (3/15/24) 70% mid LAD, 70% OM, 80% D1   • JOVEL (dyspnea on exertion) 01/15/2025   • Encounter for fitting and adjustment of other specified devices 08/23/2022    Fitting and adjustment of pessary   • Encounter for fitting and adjustment of other specified devices 12/02/2019    Fitting and adjustment of pessary   • Essential hypertension 04/02/2024   • Hypercholesteremia 04/02/2024   • Hypertension    • Hypothyroidism    • Other chest pain 02/12/2024   • PAC (premature atrial contraction) 01/15/2025   • Presence of urogenital implants 09/06/2022    Vaginal pessary present   • Trigeminal neuralgia      Past Surgical History:   Procedure Laterality Date   • APPENDECTOMY  1950   • BLADDER SUSPENSION  1994   • CARDIAC CATHETERIZATION N/A 03/15/2024    Procedure: Left Heart Cath with Coronary Angiography and LV;  Surgeon: Wood Turner MD;  Location: [facility] Cardiac Cath Lab;  Service: Cardiovascular;  Laterality: N/A;   • CARDIAC CATHETERIZATION N/A 01/24/2025    Procedure: Left Heart Cath with Coronary Angiography and LV;  Surgeon: Wood Turner MD;  Location: [facility] Cardiac Cath Lab;  Service: Cardiovascular;  Laterality: N/A;   • CHOLECYSTECTOMY  2010   • COLONOSCOPY     • CRANIOTOMY  2011    craniotomy with trigeminal nerve decompression   • HYSTERECTOMY  1990   • LIVER SURGERY  2010    resection of liver hemangioma   • OTHER SURGICAL HISTORY  01/15/2019    Abdominoplasty   • OTHER SURGICAL HISTORY  01/15/2019    Urethropexy   • OTHER SURGICAL HISTORY   10/18/2022    Sacrospinous fixation of vaginal vault   • SINUS SURGERY  2012     No family history on file.  Social History     Tobacco Use   • Smoking status: Never   • Smokeless tobacco: Never   Vaping Use   • Vaping status: Never Used   Substance Use Topics   • Alcohol use: Never   • Drug use: Never       Physical Exam   ED Triage Vitals   Temp Pulse Resp BP   -- -- -- --      SpO2 Temp src Heart Rate Source Patient Position   -- -- -- --      BP Location FiO2 (%)     -- --       Physical Exam      ED Course & MDM                  No data recorded                                 Medical Decision Making      Procedure  Procedures     Cardiovascular:      Rate and Rhythm: Normal rate and regular rhythm.      Heart sounds: Normal heart sounds.   Pulmonary:      Effort: Pulmonary effort is normal.      Breath sounds: Normal breath sounds.   Abdominal:      General: Abdomen is flat.      Palpations: Abdomen is soft.      Tenderness: There is no abdominal tenderness. There is no guarding or rebound.   Musculoskeletal:         General: No deformity. Normal range of motion.      Cervical back: Normal range of motion and neck supple.   Skin:     General: Skin is warm.      Capillary Refill: Capillary refill takes less than 2 seconds.      Coloration: Skin is not jaundiced.   Neurological:      General: No focal deficit present.      Mental Status: She is alert and oriented to person, place, and time.   Psychiatric:         Mood and Affect: Mood normal.         Behavior: Behavior normal.           ED Course & MDM                  No data recorded                                 Medical Decision Making  Medical Decision Making: Patient remained stable during my time with her in the emergency department.  CBC demonstrated no significant abnormalities Chem-7 and LFTs were all within normal limits lipase is normal troponin and delta troponin were both within normal limits.  Chest x-ray demonstrated atelectasis of the lingula and left lung base but no other significant acute abnormalities finally, the patient's EKG demonstrated a normal sinus rhythm with a rate of 61 bpm isoelectric ST segments narrow QRS complexes and a QTc of 426.    Patient presents to the emergency department with complaints of chest discomfort.  Workup was performed as above and demonstrated no significant abnormalities.  Although the patient appears well at this time, has a normal EKG and normal troponins, I feel the patient requires admission to the hospital for further evaluation and therapy and evaluation by cardiology given her recent history of cardiac intervention and known  history of coronary artery disease.  I spoke with the hospitalist who agreed the patient could be admitted to their service for further evaluation and therapy.  The patient was then admitted to the hospital in otherwise stable condition.    Amount and/or Complexity of Data Reviewed  Labs: ordered. Decision-making details documented in ED Course.  Radiology: ordered. Decision-making details documented in ED Course.  ECG/medicine tests: ordered and independent interpretation performed. Decision-making details documented in ED Course.        Procedure  Procedures     Tyrell Hunter MD  02/27/25 2034

## 2025-02-21 NOTE — INTERVAL H&P NOTE
H&P reviewed. The patient was examined and there are no changes to the H&P.    Here for LHC with Dr. Soares 2/21/25 in the setting of cp, recent MIDCAB (LIMA>LAD) 2/12/25.    mg PO pre-procedure taken this AM    After discussion with Dr. Soares, patient declines LHC and would like to focus on medical mgmt of his CAD. LHC canceled.     Further mgmt per inpatient primary and consulting teams, Cardiology following.     Dilcia Mohamud APRN-CNP  Interventional Lab/Cardiology   [facility]

## 2025-02-21 NOTE — CARE PLAN
The patient's goals for the shift include  HDS and remain free from falls    The clinical goals for the shift include Pt will have decrease in CP throughout shift. With a goal of no chest pain at end of shift.      Problem: Safety - Adult  Goal: Free from fall injury  Outcome: Progressing     Problem: Pain - Adult  Goal: Verbalizes/displays adequate comfort level or baseline comfort level  Outcome: Progressing     Problem: Discharge Planning  Goal: Discharge to home or other facility with appropriate resources  Outcome: Progressing     Problem: Chronic Conditions and Co-morbidities  Goal: Patient's chronic conditions and co-morbidity symptoms are monitored and maintained or improved  Outcome: Progressing     Problem: Chronic Conditions and Co-morbidities  Goal: Patient's chronic conditions and co-morbidity symptoms are monitored and maintained or improved  Outcome: Progressing     Problem: Nutrition  Goal: Nutrient intake appropriate for maintaining nutritional needs  Outcome: Progressing     Problem: Pain  Goal: Walks with improved pain control throughout the shift  Outcome: Progressing  Goal: Performs ADL's with improved pain control throughout shift  Outcome: Progressing  Goal: Free from opioid side effects throughout the shift  Outcome: Progressing

## 2025-02-21 NOTE — DISCHARGE SUMMARY
Discharge Diagnosis  Chest pain  Issues Requiring Follow-Up  Chest pain    Discharge Meds     Medication List      START taking these medications     amLODIPine 5 mg tablet; Commonly known as: Norvasc; Take 1 tablet (5 mg)   by mouth once daily.; Start taking on: February 22, 2025   isosorbide mononitrate ER 60 mg 24 hr tablet; Commonly known as: Imdur;   Take 1 tablet (60 mg) by mouth once daily. Do not crush or chew.; Start   taking on: February 22, 2025     CHANGE how you take these medications     clopidogrel 75 mg tablet; Commonly known as: Plavix; Take 1 tablet (75   mg) by mouth once daily.; What changed: Another medication with the same   name was removed. Continue taking this medication, and follow the   directions you see here.     CONTINUE taking these medications     acetaminophen 325 mg tablet; Commonly known as: Tylenol; Take 2 tablets   (650 mg) by mouth every 6 hours if needed for mild pain (1 - 3) (surgical   pain).   amiodarone 400 mg tablet; Commonly known as: Pacerone; Take 1 tablet   (400 mg) by mouth 2 times a day for 6 days, THEN 0.5 tablets (200 mg) once   daily.; Start taking on: February 17, 2025   aspirin 81 mg chewable tablet; Chew 1 tablet (81 mg) once daily.   carvedilol 6.25 mg tablet; Commonly known as: Coreg; Take 1 tablet (6.25   mg) by mouth 2 times a day.   colchicine 0.6 mg tablet; Take 0.5 tablets (0.3 mg) by mouth once daily.   estradiol 0.01 % (0.1 mg/gram) vaginal cream; Commonly known as: Estrace   levothyroxine 125 mcg tablet; Commonly known as: Synthroid, Levoxyl   nitroglycerin 0.3 mg SL tablet; Commonly known as: Nitrostat; Place 1   tablet (0.3 mg) under the tongue every 5 minutes if needed for chest pain   (up to 3).   polyethylene glycol 17 gram packet; Commonly known as: Glycolax,   Miralax; Take 17 g by mouth once daily as needed (constipation).   rosuvastatin 40 mg tablet; Commonly known as: Crestor; Take 1 tablet (40   mg) by mouth once daily.     STOP taking  these medications     nitrofurantoin 50 mg capsule; Commonly known as: Macrodantin       Test Results Pending At Discharge  Pending Labs       No current pending labs.            Hospital Course   Tracy Santoyo is a 74 y.o. female with a past medical history of CAD s/p robotic MIDCAB x 1(LIMA to LAD) 2/12/25, per [facility] (1/24/25), hypertension, hyperlipidemia, hypothyroidism, trigeminal neuralgia who presents with 3 day intermittent left-sided axillary pressure that worsens at night. She recently underwent robotic MIDCAB x 1 for severe LAD disease, she was discharged Monday, 2/17/2025 and was chest pain-free.      Chest Pain  -Troponin's negative, CP currently at 3/10  -EKG no ischemic, no changes noted  -Known CAD, recent MIDCAB (LIMA to LAD) 2/12/2025  -Ultimately decided against cath today, and opted for medical management.   -Started Amlodipine 5mg and Imdur 60mg   -Continue aspirin, Plavix, Coreg, rosuvastatin  -Cardiac Diet  -Follow up with Dr Turner in 1-2 weeks upon discharge     Patient seen at bedside. Events from the last visit reviewed. Discussed with staff. Results of tests and investigations from last visit reviewed and discussed with patient/Family. Electronic chart on [facility] reviewed. Input / Recommendations  from consultants appreciated and reviewed and agreed with.    Discharge summary and profile completed. Medications reviewed and discussed with patient and family.  Prescriptions completed and signed     Total discharge time in excess of 30 minutes.    Interdisciplinary rounding completed with Attending Provider, Bedside RN and TCC. Labs, results and plan of care/ discharge plan discussed and reviewed with Dr. Boykin    Pertinent Physical Exam At Time of Discharge  Physical Exam  Constitutional: NAD, pt alert and cooperative  Eyes: no icterus  ENMT: mucous membranes moist, no lesions seen  Head/Neck: Neck supple  Respiratory/Thorax: CTA bilaterally, non-labored breathing, no cough, on  RA  Cardiovascular: Regular rate and rhythm, no murmurs heard  Gastrointestinal: Nondistended, soft, non-tender, BS present x 4  : no Grant, no SP discomfort  Musculoskeletal: ROM intact, no joint swelling  Extremities: normal extremities, no edema  Neurological: A&O x 3, speech clear, follows commands appropriately, cr. n. grossly intact, sensation grossly intact, motor 5/5 throughout  Skin: Warm and dry, no lesions, no rashes  Psych: calm, stable mood    Outpatient Follow-Up  Future Appointments   Date Time Provider Department Center   2/25/2025 To Be Determined Jessica Jordan RN [facility]   2/25/2025 To Be Determined Terri Granados, PT [facility]   2/27/2025  8:40 AM CARDSURG [facility] [id number] NURSE [id number] [facility]   3/4/2025 To Be Determined Jessica Jordan RN [facility]   3/5/2025  9:00 AM [facility]   3/11/2025 To Be Determined Jessica Jordan RN [facility]   3/17/2025  2:15 PM Edgar Schmitt MD [facility]   3/18/2025 To Be Determined Jessica Jordan RN [facility]   7/30/2025  3:30 PM Silvia Can MD [id number] [facility]   10/6/2025  9:00 AM Wood Turner MD [address]     [address], APRN-CNP

## 2025-02-22 DIAGNOSIS — D62 ACUTE BLOOD LOSS AS CAUSE OF POSTOPERATIVE ANEMIA: ICD-10-CM

## 2025-02-22 DIAGNOSIS — Z95.1 S/P CABG X 1: ICD-10-CM

## 2025-02-24 ENCOUNTER — HOME CARE VISIT (OUTPATIENT)
Dept: HOME HEALTH SERVICES | Facility: HOME HEALTH | Age: 75
End: 2025-02-24
Payer: MEDICARE

## 2025-02-24 ENCOUNTER — TELEPHONE (OUTPATIENT)
Dept: CARDIAC SURGERY | Facility: HOSPITAL | Age: 75
End: 2025-02-24
Payer: MEDICARE

## 2025-02-24 NOTE — TELEPHONE ENCOUNTER
Patient is s/p robotic off pump CABG x 1 on Feb 12 with Dr. Ian Schmitt.  She called today to report night sweats.  No fevers per patient.  Labs drawn 2/21 were WNL.  We discussed some ways to help minimize night sweats which are common after surgery.  She is also on amiodarone which may be a contributing factor.  She is on amiodarone for one more week.  She has home care nursing/PT support ordered and will have her first visit tomorrow.  I will call her Thursday for a scheduled nurse visit phone call.  She will call our office with any questions/concerns.

## 2025-02-25 ENCOUNTER — HOME CARE VISIT (OUTPATIENT)
Dept: HOME HEALTH SERVICES | Facility: HOME HEALTH | Age: 75
End: 2025-02-25
Payer: MEDICARE

## 2025-02-25 VITALS
RESPIRATION RATE: 16 BRPM | HEART RATE: 70 BPM | DIASTOLIC BLOOD PRESSURE: 60 MMHG | SYSTOLIC BLOOD PRESSURE: 122 MMHG | TEMPERATURE: 98.4 F

## 2025-02-25 VITALS
OXYGEN SATURATION: 97 % | WEIGHT: 150 LBS | SYSTOLIC BLOOD PRESSURE: 98 MMHG | TEMPERATURE: 97.1 F | HEART RATE: 71 BPM | BODY MASS INDEX: 24.21 KG/M2 | DIASTOLIC BLOOD PRESSURE: 55 MMHG

## 2025-02-25 PROCEDURE — G0151 HHCP-SERV OF PT,EA 15 MIN: HCPCS | Mod: HHH

## 2025-02-25 PROCEDURE — G0299 HHS/HOSPICE OF RN EA 15 MIN: HCPCS | Mod: HHH

## 2025-02-25 SDOH — HEALTH STABILITY: PHYSICAL HEALTH
EXERCISE COMMENTS: STANDING HEEL RAISES, MINI SQUATS X 15 REPS, HIP ABD, HIP EXT, HIP FLEXION AND HAMSTRING CURLS X 10 REPS EACH  SIT TO STAND 5 REPS X 2, NO HANDS  SHOULDER ROLLS BACK, SHOULDER BLADE SQUEEZES X 10 REPS

## 2025-02-25 SDOH — HEALTH STABILITY: PHYSICAL HEALTH: EXERCISE TYPE: SEE COMMENTS BELOW

## 2025-02-25 ASSESSMENT — BALANCE ASSESSMENTS
SITTING DOWN: 2 - SAFE, SMOOTH MOTION
ARISING SCORE: 2
SITTING BALANCE: 1 - STEADY, SAFE
NUDGED SCORE: 2
TURNING 360 DEGREES STEPS: 1 - CONTINUOUS STEPS
IMMEDIATE STANDING BALANCE FIRST 5 SECONDS: 2 - STEADY WITHOUT WALKER OR OTHER SUPPORT
ARISES: 2 - ABLE WITHOUT USING ARMS
STANDING BALANCE: 2 - NARROW STANCE WITHOUT SUPPORT
ATTEMPTS TO ARISE: 2 - ABLE TO RISE, ONE ATTEMPT
BALANCE SCORE: 16
EYES CLOSED AT MAXIMUM POSITION NUDGED: 1 - STEADY
NUDGED: 2 - STEADY

## 2025-02-25 ASSESSMENT — ENCOUNTER SYMPTOMS
APPETITE LEVEL: GOOD
DYSPNEA ON EXERTION: 1
PAIN: 1
LAST BOWEL MOVEMENT: 67260
CHANGE IN APPETITE: UNCHANGED
PERSON REPORTING PAIN: PATIENT
MUSCLE WEAKNESS: 1
SUBJECTIVE PAIN PROGRESSION: UNCHANGED
FATIGUES EASILY: 1
PAIN LOCATION - PAIN SEVERITY: 1/10
PAIN LOCATION: INCISION

## 2025-02-25 ASSESSMENT — GAIT ASSESSMENTS
TRUNK: 0 - MARKED SWAY OR USES WALKING AID
TRUNK SCORE: 0
STEP CONTINUITY: 1 - STEPS APPEAR CONTINUOUS
BALANCE AND GAIT SCORE: 26
GAIT SCORE: 10
WALKING STANCE: 1 - HEELS ALMOST TOUCHING WHILE WALKING
PATH SCORE: 2
INITIATION OF GAIT IMMEDIATELY AFTER GO: 1 - NO HESITANCY
PATH: 2 - STRAIGHT WITHOUT WALKING AID
STEP SYMMETRY: 1 - RIGHT AND LEFT STEP LENGTH APPEAR EQUAL

## 2025-02-25 NOTE — CASE COMMUNICATION
DISCHARGE SUMMARY: Pt and  present.  Pt informed PT that she went to the ER last Thursday with CP.  Pt with new meds.  RN to follow today.  Vitals WNL today.  Pt denied CP and SOB.  Pt indep in the home without device    DISCIPLINE: PT  DATE OF DISCIPLINE DISCHARGE: 2.25.2025  REASON FOR DISCHARGE: Goals achieved  COORDINATION NOTE: DC summary  EVALUATION OF GOALS: Indep mobility, indep HEP, indep steps, stable  SUMMARY OF CARE P ROVIDED: Pt and CG educated on vitals, energy conservation, HEP, gait, balance and steps  DISCHARGE INSTRUCTIONS GIVEN: Pt to continue with cardiac precautions, pacing, increasing activity, HEP, gait, balance and steps  SERVICES REMAINING: RN  NOMNC OBTAINED: FLACA

## 2025-02-26 DIAGNOSIS — I25.10 CAD IN NATIVE ARTERY: ICD-10-CM

## 2025-02-27 ENCOUNTER — TELEMEDICINE CLINICAL SUPPORT (OUTPATIENT)
Dept: CARDIAC SURGERY | Facility: HOSPITAL | Age: 75
End: 2025-02-27
Payer: MEDICARE

## 2025-02-27 ENCOUNTER — TELEPHONE (OUTPATIENT)
Dept: CARDIOLOGY | Facility: CLINIC | Age: 75
End: 2025-02-27

## 2025-02-27 NOTE — PROGRESS NOTES
A telephone visit (audio only) between Tracy Santoyo (at the originating site) and the provider (at the distant site) was utilized to provide this telehealth service.    Patient is having a telehealth nurse visit today following hospital discharge on Feb 17, 2025.    Patient is 15 days s/p robotic CABG x 1 with Dr. Ian Schmitt.  She did not sleep well last night.  She has been experiencing daily night sweats and feels it may possibly be from the amiodarone.  She also woke up early this morning with a feeling of fullness that resolved when she sat up and straightened herself.  Patient denies fevers and her incision is closed without redness or drainage.  She has a cardiology follow-up visit scheduled for next week with Ellie Adams CNP (Dr. Turner) and will address the amiodarone with her.  Patient went to [facility] on Feb 21 with chest pain and was released the same day after evaluation.  She denies any further episodes.      Patient denies shortness of breath and is using the incentive spirometer as instructed.  She is ambulating often with periods of rest as needed and has no edema of the lower extremities.  Her appetite is poor some days and better on other days.  She is experiencing some constipation and will try Metamucil to increase fiber intake and regulate her bowels.  Patient is taking all medications as prescribed.    Patient has a post-op visit with Dr. Ian Schmitt scheduled for March 17th with a chest x-ray prior to the visit.  Patient will call our office with any questions or concerns.  It was a pleasure speaking to Tracy Santoyo today.

## 2025-02-28 LAB
ATRIAL RATE: 61 BPM
P AXIS: 82 DEGREES
P OFFSET: 178 MS
P ONSET: 140 MS
PR INTERVAL: 174 MS
Q ONSET: 227 MS
QRS COUNT: 10 BEATS
QRS DURATION: 72 MS
QT INTERVAL: 424 MS
QTC CALCULATION(BAZETT): 426 MS
QTC FREDERICIA: 426 MS
R AXIS: 11 DEGREES
T AXIS: 54 DEGREES
T OFFSET: 439 MS
VENTRICULAR RATE: 61 BPM

## 2025-03-03 ENCOUNTER — HOME CARE VISIT (OUTPATIENT)
Dept: HOME HEALTH SERVICES | Facility: HOME HEALTH | Age: 75
End: 2025-03-03
Payer: MEDICARE

## 2025-03-03 ENCOUNTER — TELEPHONE (OUTPATIENT)
Dept: CARDIOLOGY | Facility: CLINIC | Age: 75
End: 2025-03-03
Payer: MEDICARE

## 2025-03-03 ENCOUNTER — HOSPITAL ENCOUNTER (EMERGENCY)
Facility: HOSPITAL | Age: 75
Discharge: HOME | End: 2025-03-03
Attending: EMERGENCY MEDICINE
Payer: MEDICARE

## 2025-03-03 ENCOUNTER — APPOINTMENT (OUTPATIENT)
Dept: CARDIOLOGY | Facility: HOSPITAL | Age: 75
End: 2025-03-03
Payer: MEDICARE

## 2025-03-03 VITALS
SYSTOLIC BLOOD PRESSURE: 119 MMHG | BODY MASS INDEX: 24.11 KG/M2 | HEART RATE: 64 BPM | WEIGHT: 150 LBS | TEMPERATURE: 98.2 F | OXYGEN SATURATION: 94 % | DIASTOLIC BLOOD PRESSURE: 73 MMHG | RESPIRATION RATE: 16 BRPM | HEIGHT: 66 IN

## 2025-03-03 DIAGNOSIS — R07.9 CHEST PAIN, UNSPECIFIED TYPE: Primary | ICD-10-CM

## 2025-03-03 LAB
ALBUMIN SERPL BCP-MCNC: 4.3 G/DL (ref 3.4–5)
ALP SERPL-CCNC: 62 U/L (ref 33–136)
ALT SERPL W P-5'-P-CCNC: 17 U/L (ref 7–45)
ANION GAP SERPL CALC-SCNC: 11 MMOL/L (ref 10–20)
AST SERPL W P-5'-P-CCNC: 15 U/L (ref 9–39)
ATRIAL RATE: 64 BPM
BASOPHILS # BLD AUTO: 0.05 X10*3/UL (ref 0–0.1)
BASOPHILS NFR BLD AUTO: 0.6 %
BILIRUB DIRECT SERPL-MCNC: 0.1 MG/DL (ref 0–0.3)
BILIRUB SERPL-MCNC: 0.8 MG/DL (ref 0–1.2)
BNP SERPL-MCNC: 59 PG/ML (ref 0–99)
BUN SERPL-MCNC: 15 MG/DL (ref 6–23)
CALCIUM SERPL-MCNC: 9.4 MG/DL (ref 8.6–10.3)
CARDIAC TROPONIN I PNL SERPL HS: 3 NG/L (ref 0–13)
CARDIAC TROPONIN I PNL SERPL HS: 4 NG/L (ref 0–13)
CHLORIDE SERPL-SCNC: 107 MMOL/L (ref 98–107)
CO2 SERPL-SCNC: 26 MMOL/L (ref 21–32)
CREAT SERPL-MCNC: 0.8 MG/DL (ref 0.5–1.05)
EGFRCR SERPLBLD CKD-EPI 2021: 76 ML/MIN/1.73M*2
EOSINOPHIL # BLD AUTO: 0.32 X10*3/UL (ref 0–0.4)
EOSINOPHIL NFR BLD AUTO: 4 %
ERYTHROCYTE [DISTWIDTH] IN BLOOD BY AUTOMATED COUNT: 14.7 % (ref 11.5–14.5)
GLUCOSE SERPL-MCNC: 91 MG/DL (ref 74–99)
HCT VFR BLD AUTO: 39.5 % (ref 36–46)
HGB BLD-MCNC: 13 G/DL (ref 12–16)
IMM GRANULOCYTES # BLD AUTO: 0.02 X10*3/UL (ref 0–0.5)
IMM GRANULOCYTES NFR BLD AUTO: 0.2 % (ref 0–0.9)
LYMPHOCYTES # BLD AUTO: 2.12 X10*3/UL (ref 0.8–3)
LYMPHOCYTES NFR BLD AUTO: 26.3 %
MCH RBC QN AUTO: 30 PG (ref 26–34)
MCHC RBC AUTO-ENTMCNC: 32.9 G/DL (ref 32–36)
MCV RBC AUTO: 91 FL (ref 80–100)
MONOCYTES # BLD AUTO: 0.89 X10*3/UL (ref 0.05–0.8)
MONOCYTES NFR BLD AUTO: 11 %
NEUTROPHILS # BLD AUTO: 4.66 X10*3/UL (ref 1.6–5.5)
NEUTROPHILS NFR BLD AUTO: 57.9 %
NRBC BLD-RTO: 0 /100 WBCS (ref 0–0)
P AXIS: 60 DEGREES
P OFFSET: 171 MS
P ONSET: 151 MS
PLATELET # BLD AUTO: 393 X10*3/UL (ref 150–450)
POTASSIUM SERPL-SCNC: 3.7 MMOL/L (ref 3.5–5.3)
PR INTERVAL: 176 MS
PROT SERPL-MCNC: 7.5 G/DL (ref 6.4–8.2)
Q ONSET: 227 MS
QRS COUNT: 11 BEATS
QRS DURATION: 70 MS
QT INTERVAL: 392 MS
QTC CALCULATION(BAZETT): 404 MS
QTC FREDERICIA: 400 MS
R AXIS: 43 DEGREES
RBC # BLD AUTO: 4.33 X10*6/UL (ref 4–5.2)
SODIUM SERPL-SCNC: 140 MMOL/L (ref 136–145)
T AXIS: -11 DEGREES
T OFFSET: 423 MS
VENTRICULAR RATE: 64 BPM
WBC # BLD AUTO: 8.1 X10*3/UL (ref 4.4–11.3)

## 2025-03-03 PROCEDURE — 84484 ASSAY OF TROPONIN QUANT: CPT | Performed by: EMERGENCY MEDICINE

## 2025-03-03 PROCEDURE — 80048 BASIC METABOLIC PNL TOTAL CA: CPT | Performed by: EMERGENCY MEDICINE

## 2025-03-03 PROCEDURE — 99284 EMERGENCY DEPT VISIT MOD MDM: CPT | Performed by: EMERGENCY MEDICINE

## 2025-03-03 PROCEDURE — 83880 ASSAY OF NATRIURETIC PEPTIDE: CPT | Performed by: EMERGENCY MEDICINE

## 2025-03-03 PROCEDURE — 36415 COLL VENOUS BLD VENIPUNCTURE: CPT | Performed by: EMERGENCY MEDICINE

## 2025-03-03 PROCEDURE — 85025 COMPLETE CBC W/AUTO DIFF WBC: CPT | Performed by: EMERGENCY MEDICINE

## 2025-03-03 PROCEDURE — 82247 BILIRUBIN TOTAL: CPT | Performed by: EMERGENCY MEDICINE

## 2025-03-03 PROCEDURE — 93005 ELECTROCARDIOGRAM TRACING: CPT

## 2025-03-03 ASSESSMENT — HEART SCORE
TROPONIN: LESS THAN OR EQUAL TO NORMAL LIMIT
HISTORY: SLIGHTLY SUSPICIOUS
ECG: NORMAL
HEART SCORE: 4
AGE: 65+
RISK FACTORS: >2 RISK FACTORS OR HX OF ATHEROSCLEROTIC DISEASE

## 2025-03-03 ASSESSMENT — PAIN SCALES - GENERAL: PAINLEVEL_OUTOF10: 3

## 2025-03-03 ASSESSMENT — PAIN DESCRIPTION - LOCATION: LOCATION: CHEST

## 2025-03-03 ASSESSMENT — PAIN - FUNCTIONAL ASSESSMENT: PAIN_FUNCTIONAL_ASSESSMENT: 0-10

## 2025-03-03 ASSESSMENT — PAIN DESCRIPTION - ORIENTATION: ORIENTATION: LEFT;UPPER

## 2025-03-03 NOTE — TELEPHONE ENCOUNTER
Health Maintenance Due   Topic Date Due   • Medicare Advantage- Medicare Wellness Visit  01/01/2022       Patient is due for the topics as listed above and wishes to proceed with them.      Pt contacted and verbalized understanding.

## 2025-03-03 NOTE — ED TRIAGE NOTES
Pt presents to the ED from home with c/o chest pain. Pt had open heart surgery 2/12 and has been having intermittent chest pain since. Pt states that it woke her up out of her sleep this morning. Pt states she has taken nitroglycerin but none today.

## 2025-03-03 NOTE — ED PROVIDER NOTES
4/4:  Currently in ICU on esmolol drip  Rate not controlled  Did not improve with digoxin  cardizem drip started   Cont to hold ac per neuro recs for 4 days after stroke   HPI   Chief Complaint   Patient presents with    Chest Pain       HPI: []  74-year-old white female history of hypertension, dyslipidemia CAD, recent minimally invasive CABG, known CAD comes in with ongoing pain in her left axilla which comes and goes pain happens only at night similar radiates to her left arm she takes nitro with good relief.  Currently symptom-free.  No diaphoresis no trouble breathing.  No abdominal pain nausea diarrhea cough congestion incontinence seizures syncope or Nisky no hematemesis and hematochezia no hemoptysis.    Past history: Hypertension, dyslipidemia, CAD, recent CABG  Social: Patient denies current tobacco alcohol drug use.  REVIEW OF SYSTEMS:    GENERAL.: No weight loss, fatigue, anorexia, insomnia, fever.    EYES: No vision loss, double vision, drainage, eye pain.    ENT: No pharyngitis, dry mouth.    CARDIOPULMONARY: No chest pain, palpitations, syncope, near syncope. No shortness of breath, cough, hemoptysis.  Positive for intermittent left axillary pain    GI: No abdominal pain, change in bowel habits, melena, hematemesis, hematochezia, nausea, vomiting, diarrhea.    : No discharge, dysuria, frequency, urgency, hematuria.    MS: No limb pain, joint pain, joint swelling.    SKIN: No rashes.    PSYCH: No depression, anxiety, suicidality, homicidality.    Review of systems is otherwise negative unless stated above or in history of present illness.  Social history, family history, allergies reviewed.  PHYSICAL EXAM:    GENERAL: Vitals noted, no distress. Alert and oriented  x 3. Non-toxic.      EENT: TMs clear. Posterior oropharynx unremarkable. No meningismus. No LAD.     NECK: Supple. Nontender. No midline tenderness.     CARDIAC: Regular, rate, rhythm. No murmurs rubs or gallops. No JVD    PULMONARY: Lungs clear bilaterally with good aeration. No wheezes rales or rhonchi. No respiratory distress.     ABDOMEN: Soft, nonsurgical. Nontender. No peritoneal signs. Normoactive  bowel sounds. No pulsatile masses.     EXTREMITIES: No peripheral edema. Negative Homans bilaterally, no cords.  2+ bounding pulses well-perfused.    SKIN: No rash. Intact.  Well-healed scar from recent surgery    NEURO: No focal neurologic deficits, NIH score of 0. Cranial nerves normal as tested from II through XII.     MEDICAL DECISION MAKING:  EKG on my interpretation shows normal sinus rhythm normal axis rate mid 70s with no acute ischemic changes.  CBC with differential chemistry [employer] normal troponin x 2 is negative imaging deferred because patient has had multiple x-rays and CAT scan done recently and patient is hesitant about radiation exposure.    Treatment: IV established patient a cardiac monitor.    ED course: Patient remains in stable hemodynamic.  Remains pain-free.    Consult: Discussed with cardiology via secure chat patient's primary cardiologist Dr. Turner, and Dr. Soares and Dr. Ortez, and outpatient follow-up was recommended    Impression: #1 chest pain atypical  Plan/MDM: 74-year-old female comes in with atypical chest pain.  EKG is reassuring troponin x 2 is negative which is reassuring cardiology was curb sided via secure chat who are quite familiar with the case and outpatient follow-up recommended low concern for STEMI NSTEMI dissection pulm aneurysm pericarditis or pericardial effusion.  Patient will be discharged home advised outpatient follow with primary care doctor her primary cardiologist with strict return precaution.              Patient History   Past Medical History:   Diagnosis Date    Agatston coronary artery calcium score between 200 and 399 02/12/2024    HOU=153 (3/21/23)    Angina pectoris     treated with anti-anginal medical therapy    Chronic sinusitis     Coronary artery disease involving native coronary artery of native heart without angina pectoris 04/02/2024    [facility] (3/15/24) 70% mid LAD, 70% OM, 80% D1    JOVEL (dyspnea on exertion) 01/15/2025    Encounter for fitting  and adjustment of other specified devices 08/23/2022    Fitting and adjustment of pessary    Encounter for fitting and adjustment of other specified devices 12/02/2019    Fitting and adjustment of pessary    Essential hypertension 04/02/2024    Hypercholesteremia 04/02/2024    Hypertension     Hypothyroidism     Other chest pain 02/12/2024    PAC (premature atrial contraction) 01/15/2025    Presence of urogenital implants 09/06/2022    Vaginal pessary present    Trigeminal neuralgia      Past Surgical History:   Procedure Laterality Date    APPENDECTOMY  1950    BLADDER SUSPENSION  1994    CARDIAC CATHETERIZATION N/A 03/15/2024    Procedure: Left Heart Cath with Coronary Angiography and LV;  Surgeon: Wood Turner MD;  Location: [facility] Cardiac Cath Lab;  Service: Cardiovascular;  Laterality: N/A;    CARDIAC CATHETERIZATION N/A 01/24/2025    Procedure: Left Heart Cath with Coronary Angiography and LV;  Surgeon: Wood Turner MD;  Location: [facility] Cardiac Cath Lab;  Service: Cardiovascular;  Laterality: N/A;    CHOLECYSTECTOMY  2010    COLONOSCOPY      CRANIOTOMY  2011    craniotomy with trigeminal nerve decompression    HYSTERECTOMY  1990    LIVER SURGERY  2010    resection of liver hemangioma    OTHER SURGICAL HISTORY  01/15/2019    Abdominoplasty    OTHER SURGICAL HISTORY  01/15/2019    Urethropexy    OTHER SURGICAL HISTORY  10/18/2022    Sacrospinous fixation of vaginal vault    SINUS SURGERY  2012     No family history on file.  Social History     Tobacco Use    Smoking status: Never    Smokeless tobacco: Never   Vaping Use    Vaping status: Never Used   Substance Use Topics    Alcohol use: Never    Drug use: Never       Physical Exam   ED Triage Vitals [03/03/25 0722]   Temperature Heart Rate Respirations BP   36.8 °C (98.2 °F) 64 18 133/70      Pulse Ox Temp src Heart Rate Source Patient Position   97 % -- -- --      BP Location FiO2 (%)     -- --       Physical Exam      ED Course & [facility]   ED Course as of 03/03/25  1540   Mon Mar 03, 2025   1147 EKG has no ischemic change.  Troponin x 2 is negative CBC with differential chemistry LFTs BNP is normal discussed with cardiology outpatient follow-up recommended patient will be discharged home. [MT]      ED Course User Index  [MT] Crystal Powell MD         Diagnoses as of 03/03/25 1540   Chest pain, unspecified type                 No data recorded     Neelam Coma Scale Score: 15 (03/03/25 0720 : Patsy Barth RN)                           Medical Decision Making      Procedure  Procedures     Crystal Powell MD  03/03/25 1545

## 2025-03-05 ENCOUNTER — TELEPHONE (OUTPATIENT)
Dept: CARDIOLOGY | Facility: CLINIC | Age: 75
End: 2025-03-05

## 2025-03-05 ENCOUNTER — OFFICE VISIT (OUTPATIENT)
Dept: CARDIOLOGY | Facility: CLINIC | Age: 75
End: 2025-03-05
Payer: MEDICARE

## 2025-03-05 VITALS
HEIGHT: 66 IN | DIASTOLIC BLOOD PRESSURE: 57 MMHG | WEIGHT: 152 LBS | SYSTOLIC BLOOD PRESSURE: 107 MMHG | HEART RATE: 65 BPM | BODY MASS INDEX: 24.43 KG/M2 | TEMPERATURE: 97.9 F

## 2025-03-05 DIAGNOSIS — I48.0 PAROXYSMAL ATRIAL FIBRILLATION (MULTI): ICD-10-CM

## 2025-03-05 DIAGNOSIS — I49.1 PAC (PREMATURE ATRIAL CONTRACTION): ICD-10-CM

## 2025-03-05 DIAGNOSIS — Z95.1 S/P CABG (CORONARY ARTERY BYPASS GRAFT): ICD-10-CM

## 2025-03-05 DIAGNOSIS — I10 ESSENTIAL HYPERTENSION: ICD-10-CM

## 2025-03-05 DIAGNOSIS — E78.2 MIXED HYPERLIPIDEMIA: ICD-10-CM

## 2025-03-05 DIAGNOSIS — I25.118 CORONARY ARTERY DISEASE OF NATIVE ARTERY OF NATIVE HEART WITH STABLE ANGINA PECTORIS: Primary | ICD-10-CM

## 2025-03-05 PROCEDURE — 1160F RVW MEDS BY RX/DR IN RCRD: CPT | Performed by: NURSE PRACTITIONER

## 2025-03-05 PROCEDURE — 1157F ADVNC CARE PLAN IN RCRD: CPT | Performed by: NURSE PRACTITIONER

## 2025-03-05 PROCEDURE — 3074F SYST BP LT 130 MM HG: CPT | Performed by: NURSE PRACTITIONER

## 2025-03-05 PROCEDURE — 99215 OFFICE O/P EST HI 40 MIN: CPT | Performed by: NURSE PRACTITIONER

## 2025-03-05 PROCEDURE — 1123F ACP DISCUSS/DSCN MKR DOCD: CPT | Performed by: NURSE PRACTITIONER

## 2025-03-05 PROCEDURE — 3078F DIAST BP <80 MM HG: CPT | Performed by: NURSE PRACTITIONER

## 2025-03-05 PROCEDURE — 99417 PROLNG OP E/M EACH 15 MIN: CPT | Performed by: NURSE PRACTITIONER

## 2025-03-05 PROCEDURE — 1111F DSCHRG MED/CURRENT MED MERGE: CPT | Performed by: NURSE PRACTITIONER

## 2025-03-05 PROCEDURE — 1036F TOBACCO NON-USER: CPT | Performed by: NURSE PRACTITIONER

## 2025-03-05 PROCEDURE — 1159F MED LIST DOCD IN RCRD: CPT | Performed by: NURSE PRACTITIONER

## 2025-03-05 RX ORDER — AMOXICILLIN 500 MG/1
500 CAPSULE ORAL 2 TIMES DAILY
COMMUNITY
Start: 2025-01-02

## 2025-03-05 RX ORDER — ROSUVASTATIN CALCIUM 40 MG/1
40 TABLET, COATED ORAL DAILY
Qty: 90 TABLET | Refills: 3 | Status: SHIPPED | OUTPATIENT
Start: 2025-03-05 | End: 2026-03-05

## 2025-03-05 RX ORDER — ISOSORBIDE MONONITRATE 30 MG/1
30 TABLET, EXTENDED RELEASE ORAL DAILY
Start: 2025-03-05 | End: 2025-04-04

## 2025-03-05 RX ORDER — AMIODARONE HYDROCHLORIDE 200 MG/1
200 TABLET ORAL DAILY
Qty: 90 TABLET | Refills: 0 | Status: SHIPPED | OUTPATIENT
Start: 2025-03-05 | End: 2025-09-01

## 2025-03-05 RX ORDER — LIDOCAINE 50 MG/G
1 PATCH TOPICAL DAILY
Qty: 30 PATCH | Refills: 0 | Status: SHIPPED | OUTPATIENT
Start: 2025-03-05

## 2025-03-05 RX ORDER — AMLODIPINE BESYLATE 5 MG/1
5 TABLET ORAL DAILY
Qty: 90 TABLET | Refills: 3 | Status: SHIPPED | OUTPATIENT
Start: 2025-03-05 | End: 2026-03-05

## 2025-03-05 RX ORDER — CARVEDILOL 6.25 MG/1
6.25 TABLET ORAL 2 TIMES DAILY
Qty: 180 TABLET | Refills: 3 | Status: SHIPPED | OUTPATIENT
Start: 2025-03-05 | End: 2026-03-05

## 2025-03-05 RX ORDER — CLOPIDOGREL BISULFATE 75 MG/1
75 TABLET ORAL DAILY
Qty: 90 TABLET | Refills: 3 | Status: SHIPPED | OUTPATIENT
Start: 2025-03-05 | End: 2026-03-05

## 2025-03-05 NOTE — PROGRESS NOTES
Chief Complaint:   Hospital Follow Up     History Of Present Illness:    Tracy Santoyo is a 74 y.o. female here for hospital follow up. She presented to Dr. Turner in January 2025 with c/o JOVEL. Underwent LHC which showed double vessel dx. Robotic CABG x1 (LIMA to LAD) performed on 2/12/2025. Cx needs to still be stented. Post operatively she went into afib. Converted to NSR with amiodarone and coreg.     She returned to the the hospital 2/21/25 with chest pain. CP intermittent left-sided axillary pressure that worsens at night. Troponins negative. Amlodipine and imudr added.     She, then, returned to ED 3/3/25 with chest pain. Improved with nitroglycerine. EKG benign. Outpatient follow up recommended.     Today, her biggest complaint is fatigue. Has been able to sleep in her bed the past two nights and which is helping. Prior to this her day/night rotation was mixed up.     Denies LE edema, lightheadedness, LE edema, SOB.     LHC 1/24/25  Double vessel disease.  CABG targets to LAD, D1, OM1, OM2.  Recommendation Ccoronary artery bypass graft surgery.    TTE 1/15/2025   1. The left ventricular systolic function is normal, with a Buckley's biplane calculated ejection fraction of 66%.   2. Spectral Doppler shows a Grade I (impaired relaxation pattern) of left ventricular diastolic filling with normal left atrial filling pressure.   3. There is normal right ventricular global systolic function.   4. Aortic valve sclerosis.     Past Medical History:  She has a past medical history of Agatston coronary artery calcium score between 200 and 399 (02/12/2024), Angina pectoris, Chronic sinusitis, Coronary artery disease involving native coronary artery of native heart without angina pectoris (04/02/2024), JOVEL (dyspnea on exertion) (01/15/2025), Encounter for fitting and adjustment of other specified devices (08/23/2022), Encounter for fitting and adjustment of other specified devices (12/02/2019), Essential hypertension  "(04/02/2024), Hypercholesteremia (04/02/2024), Hypertension, Hypothyroidism, Other chest pain (02/12/2024), PAC (premature atrial contraction) (01/15/2025), Presence of urogenital implants (09/06/2022), and Trigeminal neuralgia.    Past Surgical History:  She has a past surgical history that includes Other surgical history (01/15/2019); Other surgical history (01/15/2019); Other surgical history (10/18/2022); Cardiac catheterization (N/A, 03/15/2024); Cardiac catheterization (N/A, 01/24/2025); Appendectomy (1950); Cholecystectomy (2010); Hysterectomy (1990); Colonoscopy; Sinus surgery (2012); Bladder suspension (1994); Liver surgery (2010); and Craniotomy (2011).      Social History:  She reports that she has never smoked. She has never used smokeless tobacco. She reports that she does not drink alcohol and does not use drugs.    Family History:  No family history on file.   Allergies:  Carbamazepine, Adhesive tape-silicones, Dilaudid [hydromorphone], and Metoprolol tartrate    Review of Systems  All pertinent systems have been reviewed and are negative except for what is stated in the history of present illness.    All other systems have been reviewed and are negative and noncontributory to this patient's current ailments.     Visit Vitals  /57   Pulse 65   Temp 36.6 °C (97.9 °F)   Ht 1.676 m (5' 6\")   Wt 68.9 kg (152 lb)   LMP  (LMP Unknown)   BMI 24.53 kg/m²   OB Status Postmenopausal   Smoking Status Never   BSA 1.79 m²     Last Labs:  CBC -  Lab Results   Component Value Date    WBC 8.1 03/03/2025    WBC 8.7 02/18/2025    HGB 13.0 03/03/2025    HGB 12.7 02/18/2025    HCT 39.5 03/03/2025    HCT 38.9 02/18/2025    MCV 91 03/03/2025    MCV 92.6 02/18/2025     03/03/2025     02/18/2025       CMP -  Lab Results   Component Value Date    CALCIUM 9.4 03/03/2025    CALCIUM 9.5 02/18/2025    PHOS 3.8 02/18/2025    PROT 7.5 03/03/2025    ALBUMIN 4.3 03/03/2025    ALBUMIN 4.2 02/18/2025    AST 15 " 03/03/2025    ALT 17 03/03/2025    ALKPHOS 62 03/03/2025    BILITOT 0.8 03/03/2025    BUN 15 03/03/2025    BUN 16 02/18/2025    CREATININE 0.80 03/03/2025    CREATININE 0.71 02/18/2025       LIPID PANEL -   Lab Results   Component Value Date    CHOL 140 09/27/2024    TRIG 74 09/27/2024    HDL 58.2 09/27/2024    CHHDL 2.4 09/27/2024    LDLF 146 (H) 11/01/2022    VLDL 15 09/27/2024    NHDL 82 09/27/2024       RENAL FUNCTION PANEL -   Lab Results   Component Value Date    GLUCOSE 91 03/03/2025    GLUCOSE 72 02/18/2025     03/03/2025     02/18/2025    K 3.7 03/03/2025    K 4.4 02/18/2025     03/03/2025     02/18/2025    CO2 26 03/03/2025    CO2 26 02/18/2025    ANIONGAP 11 03/03/2025    BUN 15 03/03/2025    BUN 16 02/18/2025    CREATININE 0.80 03/03/2025    CREATININE 0.71 02/18/2025    CALCIUM 9.4 03/03/2025    CALCIUM 9.5 02/18/2025    PHOS 3.8 02/18/2025    ALBUMIN 4.3 03/03/2025    ALBUMIN 4.2 02/18/2025        Lab Results   Component Value Date    BNP 59 03/03/2025         Objective   Vitals reviewed.   Constitutional:       Appearance: Healthy appearance. Not in distress.   Eyes:      Conjunctiva/sclera: Conjunctivae normal.   Neck:      Vascular: No JVR. JVD normal.   Pulmonary:      Effort: Pulmonary effort is normal.      Breath sounds: Normal breath sounds. No wheezing. No rhonchi. No rales.   Chest:      Chest wall: Not tender to palpatation.   Cardiovascular:      PMI at left midclavicular line. Normal rate. Regular rhythm. Normal S1. Normal S2.       Murmurs: There is no murmur.      No gallop.  No click. No rub.      Comments: Left breast incision well healed, no erythema/edema   Edema:     Peripheral edema absent.   Abdominal:      General: Bowel sounds are normal.      Palpations: Abdomen is soft.      Tenderness: There is no abdominal tenderness.   Musculoskeletal: Normal range of motion.         General: No tenderness. Skin:     General: Skin is warm and dry.   Neurological:       General: No focal deficit present.      Mental Status: Alert and oriented to person, place and time.   Psychiatric:         Attention and Perception: Attention normal.         Mood and Affect: Mood normal.       Assessment/Plan   Diagnoses and all orders for this visit:  Coronary artery disease of native artery of native heart with stable angina pectoris  - lidocaine (Lidoderm) 5 % patch; Place 1 patch over 12 hours on the skin once daily. Remove & discard patch within 12 hours.  - isosorbide mononitrate ER (Imdur) 30 mg 24 hr tablet; Take 1 tablet (30 mg) by mouth once daily. Do not crush or chew.  - clopidogrel (Plavix) 75 mg tablet; Take 1 tablet (75 mg) by mouth once daily.  - incision well healed  - working on timeline for PCI of Cx   - continue asa, crestor   S/P CABG (coronary artery bypass graft)  - see above  - cardiac rehab ordered   Mixed hyperlipidemia  - continue rosuvastatin (Crestor) 40 mg tablet; Take 1 tablet (40 mg) by mouth once daily  - goal LDL <70  Essential hypertension  - well controlled  - continue carvedilol and amLODIPine  PAC (premature atrial contraction)   - continue carvedilol   Paroxysmal atrial fibrillation (Multi)  - maintaining NSR   - continue amiodarone for now   - carvedilol     Follow up post PCI    Outpatient Medications:  Current Outpatient Medications   Medication Instructions    acetaminophen (TYLENOL) 650 mg, oral, Every 6 hours PRN    amiodarone (Pacerone) 400 mg tablet Take 1 tablet (400 mg) by mouth 2 times a day for 6 days, THEN 0.5 tablets (200 mg) once daily.    amiodarone (PACERONE) 200 mg, oral, Daily    amLODIPine (NORVASC) 5 mg, oral, Daily    amoxicillin (AMOXIL) 500 mg, 2 times daily    aspirin 81 mg, oral, Daily    carvedilol (COREG) 6.25 mg, oral, 2 times daily    clopidogrel (PLAVIX) 75 mg, oral, Daily    estradiol (Estrace) 0.01 % (0.1 mg/gram) vaginal cream 3 times weekly    isosorbide mononitrate ER (IMDUR) 30 mg, oral, Daily, Do not crush or chew.     levothyroxine (Synthroid, Levoxyl) 125 mcg tablet 1 tablet, Daily RT    lidocaine (Lidoderm) 5 % patch 1 patch, transdermal, Daily, Remove & discard patch within 12 hours.    nitroglycerin (NITROSTAT) 0.3 mg, sublingual, Every 5 min PRN    polyethylene glycol (GLYCOLAX, MIRALAX) 17 g, oral, Daily PRN    rosuvastatin (CRESTOR) 40 mg, oral, Daily         Exclusive of any other services or procedures performed, I, Ellie DIAMOND, spent 58 minutes in duration for this visit today.  This time consisted of chart review, obtaining history, and/or performing the exam as documented above, as well as, documenting the clinical information for the encounter in the electronic record, discussing treatment options, plans, and/or goals with patient, family, and/or caregiver, refilling medications, updating the electronic record, ordering medicines, lab work, imaging, referrals, and/or procedures as documented above and communicating with other [facility] professionals. I have discussed the results of laboratory, radiology, and cardiology studies with the patient and their family/caregiver.         I reviewed hospital records, labs, tte, lhc, or records

## 2025-03-13 ENCOUNTER — PATIENT MESSAGE (OUTPATIENT)
Dept: CARDIOLOGY | Facility: CLINIC | Age: 75
End: 2025-03-13
Payer: MEDICARE

## 2025-03-13 ENCOUNTER — TELEPHONE (OUTPATIENT)
Dept: CARDIOLOGY | Facility: CLINIC | Age: 75
End: 2025-03-13
Payer: MEDICARE

## 2025-03-13 ENCOUNTER — HOME CARE VISIT (OUTPATIENT)
Dept: HOME HEALTH SERVICES | Facility: HOME HEALTH | Age: 75
End: 2025-03-13
Payer: MEDICARE

## 2025-03-13 VITALS
RESPIRATION RATE: 16 BRPM | DIASTOLIC BLOOD PRESSURE: 60 MMHG | TEMPERATURE: 97.3 F | SYSTOLIC BLOOD PRESSURE: 114 MMHG | HEART RATE: 70 BPM | OXYGEN SATURATION: 96 %

## 2025-03-13 DIAGNOSIS — I25.118 CORONARY ARTERY DISEASE OF NATIVE ARTERY OF NATIVE HEART WITH STABLE ANGINA PECTORIS: Primary | ICD-10-CM

## 2025-03-13 PROCEDURE — G0300 HHS/HOSPICE OF LPN EA 15 MIN: HCPCS | Mod: HHH

## 2025-03-13 RX ORDER — RANOLAZINE 500 MG/1
500 TABLET, EXTENDED RELEASE ORAL 2 TIMES DAILY
Qty: 60 TABLET | Refills: 3 | Status: SHIPPED | OUTPATIENT
Start: 2025-03-13 | End: 2026-03-13

## 2025-03-13 SDOH — ECONOMIC STABILITY: GENERAL

## 2025-03-13 ASSESSMENT — ENCOUNTER SYMPTOMS
PERSON REPORTING PAIN: PATIENT
DENIES PAIN: 1
APPETITE LEVEL: GOOD
STOOL FREQUENCY: DAILY
CHANGE IN APPETITE: UNCHANGED
BOWEL PATTERN NORMAL: 1
LAST BOWEL MOVEMENT: 67277

## 2025-03-13 ASSESSMENT — PAIN SCALES - PAIN ASSESSMENT IN ADVANCED DEMENTIA (PAINAD)
NEGVOCALIZATION: 0 - NONE.
CONSOLABILITY: 0
BREATHING: 0
NEGVOCALIZATION: 0
TOTALSCORE: 0
FACIALEXPRESSION: 0 - SMILING OR INEXPRESSIVE.
BODYLANGUAGE: 0
BODYLANGUAGE: 0 - RELAXED.
CONSOLABILITY: 0 - NO NEED TO CONSOLE.
FACIALEXPRESSION: 0

## 2025-03-13 ASSESSMENT — ACTIVITIES OF DAILY LIVING (ADL): MONEY MANAGEMENT (EXPENSES/BILLS): INDEPENDENT

## 2025-03-17 ENCOUNTER — HOME CARE VISIT (OUTPATIENT)
Dept: HOME HEALTH SERVICES | Facility: HOME HEALTH | Age: 75
End: 2025-03-17
Payer: MEDICARE

## 2025-03-17 ENCOUNTER — TELEPHONE (OUTPATIENT)
Dept: CARDIOLOGY | Facility: CLINIC | Age: 75
End: 2025-03-17
Payer: MEDICARE

## 2025-03-17 ENCOUNTER — HOSPITAL ENCOUNTER (OUTPATIENT)
Dept: RADIOLOGY | Facility: HOSPITAL | Age: 75
Discharge: HOME | End: 2025-03-17
Payer: MEDICARE

## 2025-03-17 ENCOUNTER — OFFICE VISIT (OUTPATIENT)
Dept: CARDIAC SURGERY | Facility: HOSPITAL | Age: 75
End: 2025-03-17
Payer: MEDICARE

## 2025-03-17 VITALS
DIASTOLIC BLOOD PRESSURE: 72 MMHG | SYSTOLIC BLOOD PRESSURE: 120 MMHG | HEIGHT: 66 IN | OXYGEN SATURATION: 98 % | BODY MASS INDEX: 24.11 KG/M2 | HEART RATE: 65 BPM | WEIGHT: 150 LBS

## 2025-03-17 DIAGNOSIS — I25.10 CAD IN NATIVE ARTERY: Primary | ICD-10-CM

## 2025-03-17 DIAGNOSIS — I25.10 CAD IN NATIVE ARTERY: ICD-10-CM

## 2025-03-17 PROCEDURE — 71046 X-RAY EXAM CHEST 2 VIEWS: CPT

## 2025-03-17 PROCEDURE — 99214 OFFICE O/P EST MOD 30 MIN: CPT | Mod: 25 | Performed by: THORACIC SURGERY (CARDIOTHORACIC VASCULAR SURGERY)

## 2025-03-17 PROCEDURE — 71046 X-RAY EXAM CHEST 2 VIEWS: CPT | Performed by: RADIOLOGY

## 2025-03-17 ASSESSMENT — ACTIVITIES OF DAILY LIVING (ADL)
OASIS_M1830: 00
HOME_HEALTH_OASIS: 00

## 2025-03-17 NOTE — PROGRESS NOTES
CARDIOTHORACIC SURGERY FOLLOW-UP PROGRESS NOTE  Subjective   Tracy Santoyo [id number]  3/17/2025    Patient is a 74 y.o. female who presents for routine post-operative follow up. She  complaints of arm pain and some SOB . Activity level has been appropriate.       Objective   LMP  (LMP Unknown)   Heart: Regular rate and rhythm, S1, S2 normal, no murmur, click, rub or gallop.  Lungs: Clear to auscultation bilaterally.  Abdomen: Soft, non-distended, non-tender to palpation.  Extremities: Warm, well perfused, pulses intact, no cyanosis, clubbing, or edema.  Neuro: Alert and oriented X4, moving all extremities with no deficits observed.  Incision(s): Well healing without erythema or drainage.  Imaging: Chest x-ray reviewed. Sternum healing well.    Assessment/Plan   There are no diagnoses linked to this encounter.  She will have a new stress test.  The patient complained of arm pain that wakes her up and at night the pressure relieved with nitroglycerin, she also complained of shortness of breath similar to before the surgery.  The symptoms are normal of typical angina but I agreed to perform a stress test.  If that is abnormal she will need for sure a new cath.  I discussed with the patient the different alternative if the bypass is working or if the bypassing a working time of different options, including redo surgery high risk stenting or medical treatment.  Edgar Schmitt MD

## 2025-04-08 ENCOUNTER — TELEPHONE (OUTPATIENT)
Dept: CARDIOLOGY | Facility: CLINIC | Age: 75
End: 2025-04-08
Payer: MEDICARE

## 2025-04-08 NOTE — TELEPHONE ENCOUNTER
Spoke with pt and went over stress prep. NPO 4 hrs prior, no caffeine 24 hrs prior, hold isosorbide and carvedilol 24 hrs prior

## 2025-04-10 ENCOUNTER — HOSPITAL ENCOUNTER (OUTPATIENT)
Dept: CARDIOLOGY | Facility: CLINIC | Age: 75
Discharge: HOME | End: 2025-04-10
Payer: MEDICARE

## 2025-04-10 ENCOUNTER — OFFICE VISIT (OUTPATIENT)
Dept: CARDIOLOGY | Facility: CLINIC | Age: 75
End: 2025-04-10
Payer: MEDICARE

## 2025-04-10 VITALS
WEIGHT: 151 LBS | HEIGHT: 66 IN | SYSTOLIC BLOOD PRESSURE: 120 MMHG | BODY MASS INDEX: 24.27 KG/M2 | DIASTOLIC BLOOD PRESSURE: 72 MMHG | HEART RATE: 62 BPM

## 2025-04-10 DIAGNOSIS — R07.9 CHEST PAIN, UNSPECIFIED TYPE: ICD-10-CM

## 2025-04-10 DIAGNOSIS — E78.00 HYPERCHOLESTEREMIA: Primary | ICD-10-CM

## 2025-04-10 DIAGNOSIS — I25.810 CORONARY ARTERY DISEASE INVOLVING CORONARY BYPASS GRAFT OF NATIVE HEART WITHOUT ANGINA PECTORIS: ICD-10-CM

## 2025-04-10 PROBLEM — I25.10 CORONARY ARTERY DISEASE INVOLVING NATIVE CORONARY ARTERY OF NATIVE HEART WITHOUT ANGINA PECTORIS: Status: RESOLVED | Noted: 2024-04-02 | Resolved: 2025-04-10

## 2025-04-10 PROBLEM — I25.118 CORONARY ARTERY DISEASE OF NATIVE ARTERY OF NATIVE HEART WITH STABLE ANGINA PECTORIS: Status: RESOLVED | Noted: 2024-03-05 | Resolved: 2025-04-10

## 2025-04-10 PROCEDURE — A9502 TC99M TETROFOSMIN: HCPCS | Performed by: INTERNAL MEDICINE

## 2025-04-10 PROCEDURE — 93018 CV STRESS TEST I&R ONLY: CPT | Performed by: INTERNAL MEDICINE

## 2025-04-10 PROCEDURE — 78452 HT MUSCLE IMAGE SPECT MULT: CPT

## 2025-04-10 PROCEDURE — 3078F DIAST BP <80 MM HG: CPT | Performed by: INTERNAL MEDICINE

## 2025-04-10 PROCEDURE — 1160F RVW MEDS BY RX/DR IN RCRD: CPT | Performed by: INTERNAL MEDICINE

## 2025-04-10 PROCEDURE — 1123F ACP DISCUSS/DSCN MKR DOCD: CPT | Performed by: INTERNAL MEDICINE

## 2025-04-10 PROCEDURE — 93017 CV STRESS TEST TRACING ONLY: CPT

## 2025-04-10 PROCEDURE — 3430000001 HC RX 343 DIAGNOSTIC RADIOPHARMACEUTICALS: Performed by: INTERNAL MEDICINE

## 2025-04-10 PROCEDURE — 99215 OFFICE O/P EST HI 40 MIN: CPT | Performed by: INTERNAL MEDICINE

## 2025-04-10 PROCEDURE — 1159F MED LIST DOCD IN RCRD: CPT | Performed by: INTERNAL MEDICINE

## 2025-04-10 PROCEDURE — 2500000004 HC RX 250 GENERAL PHARMACY W/ HCPCS (ALT 636 FOR OP/ED): Performed by: INTERNAL MEDICINE

## 2025-04-10 PROCEDURE — 1157F ADVNC CARE PLAN IN RCRD: CPT | Performed by: INTERNAL MEDICINE

## 2025-04-10 PROCEDURE — 1036F TOBACCO NON-USER: CPT | Performed by: INTERNAL MEDICINE

## 2025-04-10 PROCEDURE — 3074F SYST BP LT 130 MM HG: CPT | Performed by: INTERNAL MEDICINE

## 2025-04-10 PROCEDURE — 93016 CV STRESS TEST SUPVJ ONLY: CPT | Performed by: INTERNAL MEDICINE

## 2025-04-10 RX ORDER — REGADENOSON 0.08 MG/ML
0.4 INJECTION, SOLUTION INTRAVENOUS
Status: COMPLETED | OUTPATIENT
Start: 2025-04-10 | End: 2025-04-10

## 2025-04-10 RX ORDER — ROSUVASTATIN CALCIUM 20 MG/1
20 TABLET, COATED ORAL DAILY
Qty: 30 TABLET | Refills: 11 | Status: SHIPPED | OUTPATIENT
Start: 2025-04-10 | End: 2026-04-10

## 2025-04-10 RX ADMIN — TETROFOSMIN 30 MILLICURIE: 0.23 INJECTION, POWDER, LYOPHILIZED, FOR SOLUTION INTRAVENOUS at 13:29

## 2025-04-10 RX ADMIN — REGADENOSON 0.4 MG: 0.08 INJECTION, SOLUTION INTRAVENOUS at 13:29

## 2025-04-10 RX ADMIN — TETROFOSMIN 10 MILLICURIE: 0.23 INJECTION, POWDER, LYOPHILIZED, FOR SOLUTION INTRAVENOUS at 12:04

## 2025-04-10 NOTE — PROGRESS NOTES
Chief Complaint:   Coronary Artery Disease     History of Present Illness     Tracy Santoyo is a 74 y.o. female presenting with coronary artery disease.  She had a history of angina and underwent coronary artery bypass surgery (mid-CAB LIMA to LAD 2/12/25).  She had issues with post-operative CP and has been readmitted without MI.  Since then, she presents today with no further CP and only complains of nocturnal right arm pain.  No JOVEL.  The patient is tolerating guideline-directed medical therapy with antiplatelet medication and is compliant.  The patient walks regularly and follows a heart healthy diet.  The patient has been well since their last office appointment and is not having any anginal symptoms or dyspnea on exertion.  No palpitations.  No recurrent AF since post-op period off amiodarone.    Review of Systems  All pertinent systems have been reviewed and are negative except for what is stated in the history of present illness.    All other systems have been reviewed and are negative and noncontributory to this patient's current ailments.  .       Previous History     Past Medical History:  She has a past medical history of Agatston coronary artery calcium score between 200 and 399 (02/12/2024), Angina pectoris, Chronic sinusitis, Coronary artery disease involving coronary bypass graft of native heart without angina pectoris (04/10/2025), Coronary artery disease involving native coronary artery of native heart without angina pectoris (04/02/2024), JOVEL (dyspnea on exertion) (01/15/2025), Encounter for fitting and adjustment of other specified devices (08/23/2022), Encounter for fitting and adjustment of other specified devices (12/02/2019), Essential hypertension (04/02/2024), Hypercholesteremia (04/02/2024), Hypertension, Hypothyroidism, Other chest pain (02/12/2024), PAC (premature atrial contraction) (01/15/2025), Presence of urogenital implants (09/06/2022), and Trigeminal neuralgia.    Past Surgical  "History:  She has a past surgical history that includes Other surgical history (01/15/2019); Other surgical history (01/15/2019); Other surgical history (10/18/2022); Cardiac catheterization (N/A, 03/15/2024); Cardiac catheterization (N/A, 01/24/2025); Appendectomy (1950); Cholecystectomy (2010); Hysterectomy (1990); Colonoscopy; Sinus surgery (2012); Bladder suspension (1994); Liver surgery (2010); and Craniotomy (2011).      Social History:  She reports that she has never smoked. She has never used smokeless tobacco. She reports that she does not drink alcohol and does not use drugs.    Family History:  No family history on file.     Allergies:  Carbamazepine, Adhesive tape-silicones, Dilaudid [hydromorphone], and Metoprolol tartrate    Outpatient Medications:  Outpatient Medications:  Current Outpatient Medications   Medication Instructions    acetaminophen (TYLENOL) 650 mg, oral, Every 6 hours PRN    amLODIPine (NORVASC) 5 mg, oral, Daily    aspirin 81 mg, oral, Daily    carvedilol (COREG) 6.25 mg, oral, 2 times daily    clopidogrel (PLAVIX) 75 mg, oral, Daily    estradiol (Estrace) 0.01 % (0.1 mg/gram) vaginal cream 3 times weekly    isosorbide mononitrate ER (IMDUR) 30 mg, oral, Daily, Do not crush or chew.    levothyroxine (Synthroid, Levoxyl) 125 mcg tablet 1 tablet, Daily RT    lidocaine (Lidoderm) 5 % patch 1 patch, transdermal, Daily, Remove & discard patch within 12 hours.    nitroglycerin (NITROSTAT) 0.3 mg, sublingual, Every 5 min PRN    polyethylene glycol (GLYCOLAX, MIRALAX) 17 g, oral, Daily PRN    ranolazine (RANEXA) 500 mg, oral, 2 times daily, Do not crush, chew, or split.    rosuvastatin (CRESTOR) 40 mg, oral, Daily         Physical Examination   Vitals:  Visit Vitals  /72 (BP Location: Right arm, Patient Position: Sitting)   Pulse 62   Ht 1.676 m (5' 6\")   Wt 68.5 kg (151 lb)   LMP  (LMP Unknown)   BMI 24.37 kg/m²   OB Status Postmenopausal   Smoking Status Never   BSA 1.79 m²       Visit " Vitals  LMP  (LMP Unknown)   OB Status Postmenopausal   Smoking Status Never           Labs/Imaging/Cardiac Studies   I have personally reviewed the patient's available lab work, primary care appointment notes, pertinent imaging studies, and cardiac studies and have discussed them and my independent interpretation of those results with the patient and caregiver at this appointment.  All pertinent recent Emergency Department evaluations and Hospital admissions were also reviewed in detail with the patient and caregiver.    Reviewed OP note, discharge summaries, hospital records, Echo, Labs and Stress test today.  Reviewed cath with Starla Soares and Ian Schmitt.    Echo:  Transthoracic Echo (TTE) Limited    Result Date: 2/21/2025   [facility], [address] [place] [address]              Tel [phone] and Fax [phone] TRANSTHORACIC ECHOCARDIOGRAM REPORT  Patient Name:       MANE MILLER      [place] Physician:   [id number] Clifford Mooney MD Study Date:         2/21/2025           Ordering Provider:   [id number] MARY JO BURIRS MRN/PID:            [id number]            Fellow: Accession#:         [id number]        Nurse: Date of Birth/Age:  1950 / 74     Sonographer:         Rosa Peraza RDCS                     years Gender assigned at  F                   Additional Staff: Birth: Height:             167.64 cm           Admit Date:          2/21/2025 Weight:             69.40 kg            Admission Status:    Observation - STAT BSA / BMI:          1.78 m2 / 24.70     Encounter#:          [id number]                     kg/m2 Blood Pressure:     141/68 mmHg         Department Location: [facility] Non                                                              Invasive Study Type:    TRANSTHORACIC ECHO (TTE) LIMITED Diagnosis/ICD: Unstable angina-I20.0 Indication:    Unstable Angina  CPT Code:      Echo Limited-94731 Patient History: CABG:              CABG x 1 on 2/12/2025. Pertinent History: CAD, Chest Pain, HTN, Palpitations, Hyperlipidemia and                    Dyspnea. Study Detail: The following Echo studies were performed: M-Mode, 2D, Doppler and               color flow.  PHYSICIAN INTERPRETATION: Left Ventricle: Left ventricular ejection fraction is normal, calculated by Buckley's biplane at 64%. There are no regional left ventricular wall motion abnormalities. The left ventricular cavity size is normal. There is normal septal and normal posterior left ventricular wall thickness. Left ventricular diastolic filling is indeterminate. Left Atrium: The left atrial size was not assessed. Right Ventricle: The right ventricle was not assessed. There is normal right ventricular global systolic function. Right Atrium: The right atrial size was not assessed. Aortic Valve: The aortic valve is trileaflet. There is no evidence of aortic valve regurgitation. Mitral Valve: The mitral valve is normal in structure. There is mild mitral valve regurgitation. Tricuspid Valve: The tricuspid valve is structurally normal. There is mild tricuspid regurgitation. The Doppler estimated RVSP is within normal limits at 27.2 mmHg. Pulmonic Valve: The pulmonic valve is structurally normal. There is no indication of pulmonic valve regurgitation. Pericardium: There is no pericardial effusion noted. Aorta: The aortic root was not assessed. Systemic Veins: The inferior vena cava appears normal in size. In comparison to the previous echocardiogram(s): Compared with study dated 1/15/2025, no significant change.  CONCLUSIONS:  1. Left ventricular ejection fraction is normal, calculated by Buckley's biplane at 64%.  2. Left ventricular diastolic filling is indeterminate.  3. There is normal right ventricular global systolic function.  4. Right ventricular systolic pressure is within normal limits. QUANTITATIVE DATA SUMMARY:   2D MEASUREMENTS:          Normal Ranges: IVSd:            0.84 cm  (0.6-1.1cm) LVPWd:           0.82 cm  (0.6-1.1cm) LVIDd:           5.22 cm  (3.9-5.9cm) LVIDs:           3.61 cm LV Mass Index:   86 g/m2 LVEDV Index:     37 ml/m2 LV % FS          30.8 %  LV SYSTOLIC FUNCTION:                      Normal Ranges: EF-A4C View:    66 % (>=55%) EF-A2C View:    61 % EF-Biplane:     64 % LV EF Reported: 64 %  LV DIASTOLIC FUNCTION:             Normal Ranges: MV Peak E:             0.56 m/s    (0.7-1.2 m/s) MV Peak A:             0.74 m/s    (0.42-0.7 m/s) E/A Ratio:             0.76        (1.0-2.2) MV e'                  0.069 m/s   (>8.0) MV lateral e'          0.08 m/s MV medial e'           0.06 m/s E/e' Ratio:            8.12        (<8.0) a'                     0.09 m/s PulmV Sys Julio:         38.60 cm/s PulmV Mejía Julio:        32.10 cm/s PulmV S/D Julio:         1.20 PulmV A Revs Julio:      20.60 cm/s PulmV A Revs Dur:      127.00 msec  MITRAL VALVE:          Normal Ranges: MV DT:        367 msec (150-240msec)  RIGHT VENTRICLE: TAPSE: 16.5 mm RV s'  0.10 m/s  TRICUSPID VALVE/RVSP:          Normal Ranges: Peak TR Velocity:     2.46 m/s Est. RA Pressure:     3 mmHg RV Syst Pressure:     27 mmHg  (< 30mmHg) IVC Diam:             1.20 cm  PULMONIC VALVE:          Normal Ranges: PV Accel Time:  158 msec (>120ms)  PULMONARY VEINS: PulmV A Revs Dur: 127.00 msec PulmV A Revs Julio: 20.60 cm/s PulmV Mejía Julio:   32.10 cm/s PulmV S/D Julio:    1.20 PulmV Sys Julio:    38.60 cm/s  [id number] Clifford Mooney MD Electronically signed on 2/21/2025 at 11:05:09 AM  ** Final **     Transthoracic Echo (TTE) Complete    Result Date: 1/15/2025        [facility] [place], [place] [address]        Tel [phone] and Fax [phone] TRANSTHORACIC ECHOCARDIOGRAM REPORT  Patient Name:       MANE Holm Physician:    [id number] Wood Turner                                                                MD Study Date:         1/15/2025           Ordering Provider:    [id number] JOSEPHINE ISLAS MRN/PID:            [id number]            Fellow: Accession#:         [id number]        Nurse: Date of Birth/Age:  1950 / 74     Sonographer:          Lou uribe                                     RDKATHY Gender assigned at  F                   Additional Staff: Birth: Height:             167.64 cm           Admit Date:           1/15/2025 Weight:             72.57 kg            Admission Status:     Outpatient BSA / BMI:          1.82 m2 / 25.82     Encounter#:           [id number]                     kg/m2 Blood Pressure:     144/66 mmHg         Department Location:  [facility] Study Type:    TRANSTHORACIC ECHO (TTE) COMPLETE Diagnosis/ICD: Angina pectoris, unspecified-I20.9 Indication:    Angina, Recent Onset CPT Code:      Echo Complete w Full Doppler-92790  Study Detail: The following Echo studies were performed: 2D, M-Mode, Doppler and               color flow. Patient's heart rhythm is normal sinus rhythm. A               bubble study was not performed.  PHYSICIAN INTERPRETATION: Left Ventricle: The left ventricular systolic function is normal, with a Buckley's biplane calculated ejection fraction of 66%. There are no regional left ventricular wall motion abnormalities. The left ventricular cavity size is normal. There is mildly increased septal thickness. Spectral Doppler shows a Grade I (impaired relaxation pattern) of left ventricular diastolic filling with normal left atrial filling pressure. Left Atrium: The left atrium is normal in size. A bubble study using agitated saline was not performed. Right Ventricle: The right ventricle is normal in size. There is normal right ventricular global systolic function. Right Atrium: The right atrium is normal in size. Aortic  Valve: The aortic valve is trileaflet. There is evidence of mildly elevated transaortic gradients consistent with sclerosis of the aortic valve. There is trace aortic valve regurgitation. The peak instantaneous gradient of the aortic valve is 9 mmHg. Mitral Valve: The mitral valve is mildly thickened. There is mild mitral valve regurgitation. Tricuspid Valve: The tricuspid valve is structurally normal. There is trace to mild tricuspid regurgitation. Pulmonic Valve: The pulmonic valve is structurally normal. There is physiologic pulmonic valve regurgitation. Pericardium: There is no pericardial effusion noted. Aorta: The aortic root is normal. In comparison to the previous echocardiogram(s): Compared with study dated 4/2/2024, no significant change.  CONCLUSIONS:  1. The left ventricular systolic function is normal, with a Buckley's biplane calculated ejection fraction of 66%.  2. Spectral Doppler shows a Grade I (impaired relaxation pattern) of left ventricular diastolic filling with normal left atrial filling pressure.  3. There is normal right ventricular global systolic function.  4. Aortic valve sclerosis. QUANTITATIVE DATA SUMMARY:  2D MEASUREMENTS:          Normal Ranges: LAs:             4.03 cm  (2.7-4.0cm) RVIDd:           2.27 cm  (0.9-3.6cm) IVSd:            1.03 cm  (0.6-1.1cm) LVPWd:           0.95 cm  (0.6-1.1cm) LVIDd:           4.72 cm  (3.9-5.9cm) LVIDs:           2.95 cm LV Mass Index:   90 g/m2 LVEDV Index:     35 ml/m2 LV % FS          37.5 %  LA VOLUME:                    Normal Ranges: LA Vol A4C:        38.8 ml    (22+/-6mL/m2) LA Vol A2C:        58.8 ml LA Vol BP:         48.7 ml LA Vol Index A4C:  21.3 ml/m2 LA Vol Index A2C:  32.3 ml/m2 LA Vol Index BP:   26.8 ml/m2 LA Area A4C:       15.4 cm2 LA Area A2C:       18.6 cm2 LA Major Axis A4C: 5.2 cm LA Major Axis A2C: 5.0 cm LA Vol A4C:        36.6 ml LA Vol A2C:        55.6 ml LA Vol Index BSA:  25.3 ml/m2  RA VOLUME BY A/L METHOD:             Normal Ranges: RA Vol A4C:              38.1 ml    (8.3-19.5ml) RA Vol Index A4C:        20.9 ml/m2 RA Area A4C:             14.2 cm2 RA Major Axis A4C:       4.5 cm  LV SYSTOLIC FUNCTION BY 2D PLANIMETRY (MOD):                      Normal Ranges: EF-A4C View:    66 % (>=55%) EF-A2C View:    66 % EF-Biplane:     66 % LV EF Reported: 66 %  LV DIASTOLIC FUNCTION:             Normal Ranges: MV Peak E:             0.61 m/s    (0.7-1.2 m/s) MV Peak A:             0.83 m/s    (0.42-0.7 m/s) E/A Ratio:             0.73        (1.0-2.2) MV e'                  0.063 m/s   (>8.0) MV lateral e'          0.07 m/s MV medial e'           0.05 m/s MV A Dur:              106.57 msec E/e' Ratio:            9.61        (<8.0)  MITRAL VALVE:          Normal Ranges: MV DT:        289 msec (150-240msec)  AORTIC VALVE:           Normal Ranges: AoV Vmax:      1.48 m/s (<=1.7m/s) AoV Peak P.8 mmHg (<20mmHg) LVOT Max Julio:  0.96 m/s (<=1.1m/s) LVOT VTI:      22.74 cm LVOT Diameter: 2.03 cm  (1.8-2.4cm) AoV Area,Vmax: 2.08 cm2 (2.5-4.5cm2)  RIGHT VENTRICLE: RV Basal 3.40 cm RV Major 6.6 cm TAPSE:   25.1 mm RV s'    0.13 m/s  TRICUSPID VALVE/RVSP:          Normal Ranges: Peak TR Velocity:     2.61 m/s Est. RA Pressure:     3 mmHg RV Syst Pressure:     30 mmHg  (< 30mmHg)  PULMONIC VALVE:          Normal Ranges: PV Accel Time:  129 msec (>120ms) PV Max Julio:     0.9 m/s  (0.6-0.9m/s) PV Max PG:      3.2 mmHg  AORTA: Asc Ao Diam 3.05 cm  [id number] Wood Turner MD Electronically signed on 1/15/2025 at 1:01:36 PM  ** Final **         Assessment and Recommendations     Assessment/Plan       1. Hypercholesteremia (Primary)  She will resume Crestor 20 mg every day as her right arm pain is no better off statin and on Repatha. LDL goal <70    2. Coronary artery disease involving coronary bypass graft of native heart without angina pectoris  The patient's CAD, as detailed in the HPI, has been clinically stable, without any anginal symptoms or dyspnea.   The patient will continue treatment with guideline-directed medical therapy with ASA/Plavix and statin medications and was advised regular exercise and a heart healthy diet.  No ischemia on stress MPI.  Moderate LCX disease (small vessel) treat medically. Start cardiac rehab.   DAPT for 1 year with ASA/Plavix.  Stop nitrates.     Tracy Santoyo will return in 3 months for an office visit.         Wood Turner MD    Exclusive of any other services or procedures performed, I, Wood Turner MD , spent 30 minutes in duration for this visit today.  This time consisted of chart review, obtaining history, and/or performing the exam as documented above as well as documenting the clinical information for the encounter in the electronic record, discussing treatment options, plans, and/or goals with patient, family, and/or caregiver, refilling medications, updating the electronic record, ordering medicines, lab work, imaging, referrals, and/or procedures as documented above and communicating with other [facility] professionals. I have discussed the results of laboratory, radiology, and cardiology studies with the patient and their family/caregiver.

## 2025-04-24 ENCOUNTER — CLINICAL SUPPORT (OUTPATIENT)
Dept: CARDIAC REHAB | Facility: CLINIC | Age: 75
End: 2025-04-24
Payer: MEDICARE

## 2025-04-24 VITALS — HEIGHT: 66 IN | BODY MASS INDEX: 24.11 KG/M2 | WEIGHT: 150 LBS

## 2025-04-24 DIAGNOSIS — E78.00 HYPERCHOLESTEREMIA: ICD-10-CM

## 2025-04-24 DIAGNOSIS — I25.810 CORONARY ARTERY DISEASE INVOLVING CORONARY BYPASS GRAFT OF NATIVE HEART WITHOUT ANGINA PECTORIS: ICD-10-CM

## 2025-04-24 DIAGNOSIS — I25.810 CORONARY ARTERY DISEASE INVOLVING CORONARY BYPASS GRAFT OF NATIVE HEART WITHOUT ANGINA PECTORIS: Primary | ICD-10-CM

## 2025-04-24 ASSESSMENT — PATIENT HEALTH QUESTIONNAIRE - PHQ9
3. TROUBLE FALLING OR STAYING ASLEEP OR SLEEPING TOO MUCH: NOT AT ALL
1. LITTLE INTEREST OR PLEASURE IN DOING THINGS: NOT AT ALL
4. FEELING TIRED OR HAVING LITTLE ENERGY: NEARLY EVERY DAY
8. MOVING OR SPEAKING SO SLOWLY THAT OTHER PEOPLE COULD HAVE NOTICED. OR THE OPPOSITE, BEING SO FIGETY OR RESTLESS THAT YOU HAVE BEEN MOVING AROUND A LOT MORE THAN USUAL: NOT AT ALL
SUM OF ALL RESPONSES TO PHQ QUESTIONS 1-9: 3
9. THOUGHTS THAT YOU WOULD BE BETTER OFF DEAD, OR OF HURTING YOURSELF: NOT AT ALL
5. POOR APPETITE OR OVEREATING: NOT AT ALL
6. FEELING BAD ABOUT YOURSELF - OR THAT YOU ARE A FAILURE OR HAVE LET YOURSELF OR YOUR FAMILY DOWN: NOT AT ALL
SUM OF ALL RESPONSES TO PHQ QUESTIONS 1-9: 3
7. TROUBLE CONCENTRATING ON THINGS, SUCH AS READING THE NEWSPAPER OR WATCHING TELEVISION: NOT AT ALL
SUM OF ALL RESPONSES TO PHQ9 QUESTIONS 1 & 2: 0
2. FEELING DOWN, DEPRESSED OR HOPELESS: NOT AT ALL

## 2025-04-24 ASSESSMENT — DUKE ACTIVITY SCORE INDEX (DASI)
CAN YOU DO HEAVY WORK AROUND THE HOUSE LIKE SCRUBBING FLOORS OR LIFTING AND MOVING HEAVY FURNITURE: NO
CAN YOU WALK A BLOCK OR TWO ON LEVEL GROUND: YES
CAN YOU DO YARD WORK LIKE RAKING LEAVES, WEEDING OR PUSHING A MOWER: NO
CAN YOU RUN A SHORT DISTANCE: NO
DASI METS SCORE: 5.1
CAN YOU CLIMB A FLIGHT OF STAIRS OR WALK UP A HILL: YES
CAN YOU DO MODERATE WORK AROUND THE HOUSE LIKE VACUUMING, SWEEPING FLOORS OR CARRYING GROCERIES: YES
CAN YOU TAKE CARE OF YOURSELF (EAT, DRESS, BATHE, OR USE TOILET): YES
TOTAL_SCORE: 18.95
CAN YOU PARTICIPATE IN MODERATE RECREATIONAL ACTIVITIES LIKE GOLF, BOWLING, DANCING, DOUBLES TENNIS OR THROWING A BASEBALL OR FOOTBALL: NO
CAN YOU PARTICIPATE IN STRENOUS SPORTS LIKE SWIMMING, SINGLES TENNIS, FOOTBALL, BASKETBALL, OR SKIING: NO
CAN YOU HAVE SEXUAL RELATIONS: NO
CAN YOU WALK INDOORS, SUCH AS AROUND YOUR HOUSE: YES
CAN YOU DO LIGHT WORK AROUND THE HOUSE LIKE DUSTING OR WASHING DISHES: YES

## 2025-04-24 ASSESSMENT — PAIN SCALES - GENERAL: PAINLEVEL_OUTOF10: 4

## 2025-04-24 NOTE — PROGRESS NOTES
Cardiac Rehabilitation Initial Treatment Plan    Name: Tracy Santoyo  Medical Record Number: [id number]  YOB: 1950  Age: 74 y.o.    Today’s Date: 4/24/2025  Primary Care Physician: Marquez Duncan DO  Referring Physician: Wood Turner MD  Program Location: [address]  Primary Diagnosis:   1. Hypercholesteremia  Referral to Cardiac Rehab      2. Coronary artery disease involving coronary bypass graft of native heart without angina pectoris  Referral to Cardiac Rehab         Onset/Date of Diagnosis: 2/12/2025    Initial Assessment, not yet started program.    AACVPR Risk Stratification: Moderate     Falls Risk: Medium  Psychosocial Assessment     Pre PHQ-9: 3      Sent PH-Q 9 to MD if score > 20: No; score < 20    Pt reported/currently experiencing stress: No  Patient uses stress management skills: No   History of: no history of anxiety or depression  Currently seeing a mental health provider: No  Social Support: Yes, Whom:, Greyson Hallman  Quality of Life Survey: SF-36   SF-36 Pre Post   Physical Component Score  TBD   Mental Component Score  TBD     Learning Assessment:  Learning assessment/barriers: None  Preferred learning method: Auditory and Visual  Barriers: None  Comments:    Stages of Change:Preparation    Psychosocial Plan    Goal Status: Initial Assessment; goals not yet started    Psychosocial Goals: Maintain or lower PH-Q 9 score by discharge    Psychosocial Interventions/Education: To be done in Cardiac Rehab.    Initial Assessment: not yet started    Nutrition Assessment:    Hyperlipidemia: Yes     Lipids:   Lab Results   Component Value Date    CHOL 140 09/27/2024    HDL 58.2 09/27/2024    LDLF 146 (H) 11/01/2022    TRIG 74 09/27/2024       Current Dietary Guidelines: Low fat, Low sodium  Barriers to dietary change: no    Diet Habit Survey: Picture Your Plate  Pre: Survey to be given during initial exercise session.  Post: To be done at  "discharge.    Diabetes Assessment    No results found for: \"HGBA1C\"    History of Diabetes: No    Weight Management    Height: 167.6 cm (5' 6\")  Weight: 68 kg (150 lb)  BMI (Calculated): 24.22  No data recorded    Nutrition Plan    Goal Status: Initial Assessment; goals not yet started    Nutrition Goals: Lipid Goal: HDL>45, LDL <70, Total <180, Trigs <150, Improve Diet Habit Survey score by 5-10 points by discharge, Adapt a low-sodium, DASH diet prior to discharge, Adapt a Mediterranean focused diet prior to discharge, and Learn how to read and interpret nutrition labels prior to discharge    Nutrition Interventions/Education:   To be done in Cardiac Rehab.    Initial Assessment: not yet started    Exercise Assessment    Home Exercise: No  Mode: NA  Frequency: NA  Duration: NA    Exercise Prescription     Exercise Prescription based on: Duke Activity Status Index (DASI)    DASI Score: 18.95   MET Score: 5.1   Frequency:  2 days/week   Mode: Treadmill, NuStep, and Recumbent Cycle   Duration: 21 total aerobic minutes   Intensity: RPE 12-16  Target HR:  To be calculated after 6 attended sessions.  MET Level: 2.5  Patient wears supplemental O2: No     Modality Workload METs Duration (minutes)   1 Pre-Exercise      2 Treadmill 1.9 mph  2.5 7 :00   3 NuStep  [address]  @ Load 3 2.5 7 :00   4 Recumbent Bike 3 Load @ 45 rpms  2.0 7 :00   5 Cooldown    5 :00   6 Post-Exercise        Resistance Training: No   Home Exercise Prescription given: To be given prior to discharge from program.    Exercise Plan    Goal Status: Initial Assessment; goals not yet started    Exercise Goals: Increase total exercise duration to 30-45 minutes and Establish a home exercise program before discharge    Exercise Interventions/Education:   To be done in Cardiac Rehab.    Initial Assessment: not yet started    Other Core Components/Risk Factor Assessment:    Medication adherence  Current Medications:   Medication Documentation Review Audit       " Reviewed by Wood Turner MD (Physician) on 04/10/25 at 1220      Medication Order Taking? Sig Documenting Provider Last Dose Status   acetaminophen (Tylenol) 325 mg tablet [id number]  Take 2 tablets (650 mg) by mouth every 6 hours if needed for mild pain (1 - 3) (surgical pain). DARA Reed  Active   amiodarone (Pacerone) 400 mg tablet [id number]  Take 1 tablet (400 mg) by mouth 2 times a day for 6 days, THEN 0.5 tablets (200 mg) once daily. DARA Reed  Active   amLODIPine (Norvasc) 5 mg tablet [id number]  Take 1 tablet (5 mg) by mouth once daily. DARA Elder  Active   amoxicillin (Amoxil) 500 mg capsule [id number]  Take 1 capsule (500 mg) by mouth twice a day.   Patient not taking: Reported on 3/5/2025    Historical Provider, MD  Active   aspirin 81 mg chewable tablet [id number]  Chew 1 tablet (81 mg) once daily. DARA Reed  Active   carvedilol (Coreg) 6.25 mg tablet [id number]  Take 1 tablet (6.25 mg) by mouth 2 times a day. DARA Elder  Active   clopidogrel (Plavix) 75 mg tablet [id number]  Take 1 tablet (75 mg) by mouth once daily. DARA Elder  Active   estradiol (Estrace) 0.01 % (0.1 mg/gram) vaginal cream [id number] No Insert into the vagina 3 times a week. Historical Provider, MD Past Month Active   isosorbide mononitrate ER (Imdur) 30 mg 24 hr tablet [id number]  Take 1 tablet (30 mg) by mouth once daily. Do not crush or chew. DARA Elder  Active   levothyroxine (Synthroid, Levoxyl) 125 mcg tablet [id number] No Take 1 tablet (125 mcg) by mouth once daily. Historical Provider, MD 2/12/2025 Morning Active   lidocaine (Lidoderm) 5 % patch [id number]  Place 1 patch over 12 hours on the skin once daily. Remove & discard patch within 12 hours. Ellie M Bryan, APRN-CNP  Active   nitroglycerin (Nitrostat) 0.3 mg SL tablet [id number]  Place 1 tablet (0.3 mg) under the tongue every 5 minutes if needed for chest pain (up to  3). JAYLAN Sparks-CNP  Active   polyethylene glycol (Glycolax, Miralax) 17 gram packet [id number]  Take 17 g by mouth once daily as needed (constipation). [facility]  Active   ranolazine (Ranexa) 500 mg 12 hr tablet [id number]  Take 1 tablet (500 mg) by mouth 2 times a day. Do not crush, chew, or split. Wood Turner MD  Active   rosuvastatin (Crestor) 40 mg tablet [id number]  Take 1 tablet (40 mg) by mouth once daily. JAYLAN Elder-CNP  Active                                 Medication compliance: No, Pt stopped taking Coreg due to low BP.  Pt was advised to let Dr. Turner know.   Uses pill box/organizer: No    Carries medication list: Yes     Blood Pressure Management  History of Hypertension: Yes   Medication Changes: No   Resting BP:  There were no vitals taken for this visit.     Heart Failure Management  Hx of Heart Failure: No    Smoking/Tobacco Assessment  Tobacco Use History[1]    Other Core Component Plan    Goal Status: Initial Assessment; goals not yet started    Other Core Component Goals: Medication compliance, Verbalize medication usage and drug actions by discharge, Verbalize SL NTG action and proper dosage by discharge, and Achieve resting BP of < 130/80 by discharge    Other Core Component Interventions/Education:   To be done in Cardiac Rehab.    Initial Assessment: not yet started    Individual Patient Goals:    Get back to playing golf by the end of June or beginning of July.  Go up a flight of stairs without feeling sob by discharge.    Goal Status: Initial Assessment; goals not yet started    Staff Comments:  Evaluation completed virtually.  Pt wants to attend two days a week.     Rehab Staff Signature: Desire Flaherty RN             [1]   Social History  Tobacco Use   Smoking Status Never   Smokeless Tobacco Never

## 2025-04-28 ENCOUNTER — TELEPHONE (OUTPATIENT)
Dept: CARDIOLOGY | Facility: CLINIC | Age: 75
End: 2025-04-28
Payer: MEDICARE

## 2025-04-29 ENCOUNTER — APPOINTMENT (OUTPATIENT)
Dept: CARDIAC REHAB | Facility: CLINIC | Age: 75
End: 2025-04-29
Payer: MEDICARE

## 2025-05-01 ENCOUNTER — APPOINTMENT (OUTPATIENT)
Dept: CARDIAC REHAB | Facility: CLINIC | Age: 75
End: 2025-05-01
Payer: MEDICARE

## 2025-05-06 ENCOUNTER — CLINICAL SUPPORT (OUTPATIENT)
Dept: CARDIAC REHAB | Facility: CLINIC | Age: 75
End: 2025-05-06
Payer: MEDICARE

## 2025-05-06 DIAGNOSIS — I25.810 CORONARY ARTERY DISEASE INVOLVING CORONARY BYPASS GRAFT OF NATIVE HEART WITHOUT ANGINA PECTORIS: ICD-10-CM

## 2025-05-06 PROCEDURE — 93798 PHYS/QHP OP CAR RHAB W/ECG: CPT | Performed by: INTERNAL MEDICINE

## 2025-05-08 ENCOUNTER — CLINICAL SUPPORT (OUTPATIENT)
Dept: CARDIAC REHAB | Facility: CLINIC | Age: 75
End: 2025-05-08
Payer: MEDICARE

## 2025-05-08 DIAGNOSIS — I25.810 CORONARY ARTERY DISEASE INVOLVING CORONARY BYPASS GRAFT OF NATIVE HEART WITHOUT ANGINA PECTORIS: ICD-10-CM

## 2025-05-08 PROCEDURE — 93798 PHYS/QHP OP CAR RHAB W/ECG: CPT | Performed by: INTERNAL MEDICINE

## 2025-05-12 ENCOUNTER — APPOINTMENT (OUTPATIENT)
Dept: CARDIAC REHAB | Facility: CLINIC | Age: 75
End: 2025-05-12
Payer: MEDICARE

## 2025-05-12 DIAGNOSIS — I25.810 CORONARY ARTERY DISEASE INVOLVING CORONARY BYPASS GRAFT OF NATIVE HEART WITHOUT ANGINA PECTORIS: ICD-10-CM

## 2025-05-12 PROCEDURE — 93798 PHYS/QHP OP CAR RHAB W/ECG: CPT | Performed by: INTERNAL MEDICINE

## 2025-05-13 ENCOUNTER — APPOINTMENT (OUTPATIENT)
Dept: CARDIAC REHAB | Facility: CLINIC | Age: 75
End: 2025-05-13
Payer: MEDICARE

## 2025-05-14 ENCOUNTER — TELEPHONE (OUTPATIENT)
Dept: CARDIOLOGY | Facility: CLINIC | Age: 75
End: 2025-05-14
Payer: MEDICARE

## 2025-05-15 ENCOUNTER — CLINICAL SUPPORT (OUTPATIENT)
Dept: CARDIAC REHAB | Facility: CLINIC | Age: 75
End: 2025-05-15
Payer: MEDICARE

## 2025-05-15 DIAGNOSIS — I25.810 CORONARY ARTERY DISEASE INVOLVING CORONARY BYPASS GRAFT OF NATIVE HEART WITHOUT ANGINA PECTORIS: ICD-10-CM

## 2025-05-15 PROCEDURE — 93798 PHYS/QHP OP CAR RHAB W/ECG: CPT | Performed by: INTERNAL MEDICINE

## 2025-05-15 NOTE — PROGRESS NOTES
Cardiac Rehab Education  Tracy Santoyo   [id number]    5/15/2025  Education on sodium intake and consequences of high blood pressure was done during today's session. Discussed with patient the risks of excess levels of sodium and how to decrease dietary intake with provided list of substitutions. Handouts were given for home reference.      Signature Desire Flaherty RN

## 2025-05-19 ENCOUNTER — CLINICAL SUPPORT (OUTPATIENT)
Dept: CARDIAC REHAB | Facility: CLINIC | Age: 75
End: 2025-05-19
Payer: MEDICARE

## 2025-05-19 DIAGNOSIS — I25.810 CORONARY ARTERY DISEASE INVOLVING CORONARY BYPASS GRAFT OF NATIVE HEART WITHOUT ANGINA PECTORIS: ICD-10-CM

## 2025-05-19 PROCEDURE — 93798 PHYS/QHP OP CAR RHAB W/ECG: CPT | Performed by: INTERNAL MEDICINE

## 2025-05-20 ENCOUNTER — APPOINTMENT (OUTPATIENT)
Dept: CARDIAC REHAB | Facility: CLINIC | Age: 75
End: 2025-05-20
Payer: MEDICARE

## 2025-05-20 ENCOUNTER — DOCUMENTATION (OUTPATIENT)
Dept: CARDIAC REHAB | Facility: CLINIC | Age: 75
End: 2025-05-20
Payer: MEDICARE

## 2025-05-20 NOTE — PROGRESS NOTES
"  Cardiac Rehabilitation 30 Day Reassessment    Name: Tracy Santoyo  Medical Record Number: [id number]  YOB: 1950  Age: 74 y.o.    Today’s Date: 5/20/2025  Primary Care Physician: Marquez Duncan DO  Referring Physician: Wood Turner MD  Program Location: [address]  Primary Diagnosis:   1. Hypercholesteremia  Referral to Cardiac Rehab       2. Coronary artery disease involving coronary bypass graft of native heart without angina pectoris  Referral to Cardiac Rehab        Onset/Date of Diagnosis: 2/12/2025    Sessions # 5    AACVPR Risk Stratification:  Moderate     Falls Risk: Medium  Psychosocial Assessment     Pre PHQ-9: 3       Sent PH-Q 9 to MD if score > 20: No; score < 20    Pt reported/currently experiencing stress: No  Patient uses stress management skills: No   History of: no history of anxiety or depression  Currently seeing a mental health provider: No  Social Support: Yes, Whom:, Greyson Hallman  Quality of Life Survey: SF-36   SF-36 Pre Post   Physical Component Score     Mental Component Score       Learning Assessment:  Learning assessment/barriers: None  Preferred learning method: Auditory and Visual  Barriers: None  Comments:    Stages of Change:Action    Psychosocial Plan    Goal Status: In progress    Psychosocial Goals: Maintain or lower PH-Q 9 score by discharge    Psychosocial Interventions/Education:   4/24/2025 Initial PH-Q 9 reviewed.  5/20/25 Pt reports no new stress.     Psychosocial Reassessment: in progress    Nutrition Assessment:    Hyperlipidemia: Yes     Lipids:   Lab Results   Component Value Date    CHOL 140 09/27/2024    HDL 58.2 09/27/2024    LDLF 146 (H) 11/01/2022    TRIG 74 09/27/2024       Current Dietary Guidelines: Low fat, Low sodium  Barriers to dietary change: no    Diet Habit Survey: Picture Your Plate  Pre: 72  Post: To be done at discharge.    Diabetes Assessment    No results found for: \"HGBA1C\"    History of Diabetes: " "No    Weight Management    Height: 167.6 cm (5' 6\")  Weight: 68 kg (150 lb)  BMI (Calculated): 24.22      Nutrition Plan    Goal Status: In progress    Nutrition Goals: Lipid Goal: HDL>45, LDL <70, Total <180, Trigs <150, Improve Diet Habit Survey score by 5-10 points by discharge, Adapt a low-sodium, DASH diet prior to discharge, Adapt a Mediterranean focused diet prior to discharge, and Learn how to read and interpret nutrition labels prior to discharge    Nutrition Interventions/Education:   5/15/2025 Diet Habit Survey results reviewed with pt.  Dietitian's comments and recommendations given to Pt for review.      Nutrition Reassessment: in progress    Exercise Assessment    Home Exercise: No  Mode: NA  Frequency: NA  Duration: NA    Exercise Prescription     Exercise Prescription based on: Duke Activity Status Index (DASI)    DASI Score: 18.95   MET Score: 5.1   Frequency:  2 days/week   Mode: Treadmill, NuStep, and Recumbent Cycle   Duration: 21 total aerobic minutes   Intensity: RPE 12-16  Target HR:  To be calculated after 6 attended sessions.  MET Level: 3.0  Patient wears supplemental O2: No     Modality Workload METs Duration (minutes)   1 Pre-Exercise      2 Treadmill 2.2 mph @ 1 % 3.0 9 :00   3 NuStep  [address]  @ Load 5 2.7 9 :00   4 Recumbent Bike 5 Load @ 50 rpms  2.6 9 :00   5 Cooldown    5 :00   6 Post-Exercise        Resistance Training: No   Home Exercise Prescription given: To be given prior to discharge from program.    Exercise Plan    Goal Status: In progress  Exercise Goals: Increase total exercise duration to 30-45 minutes and Establish a home exercise program before discharge    Exercise Interventions/Education:   Resistance training education was done. Resistance bands were measured and cut for patient to begin home strength training exercises. Frequency, intensity/RPE, and form were reviewed with educational handout given for further reference. Patient was encouraged to comeback with any " questions.      Exercise Reassessment: in progress    Other Core Components/Risk Factor Assessment:    Medication adherence  Current Medications:   Medication Documentation Review Audit       Reviewed by Wood Turner MD (Physician) on 04/10/25 at 1220      Medication Order Taking? Sig Documenting Provider Last Dose Status   acetaminophen (Tylenol) 325 mg tablet [id number]  Take 2 tablets (650 mg) by mouth every 6 hours if needed for mild pain (1 - 3) (surgical pain). DARA Reed  Active   amiodarone (Pacerone) 400 mg tablet [id number]  Take 1 tablet (400 mg) by mouth 2 times a day for 6 days, THEN 0.5 tablets (200 mg) once daily. DARA Reed  Active   amLODIPine (Norvasc) 5 mg tablet [id number]  Take 1 tablet (5 mg) by mouth once daily. DARA Elder  Active   amoxicillin (Amoxil) 500 mg capsule [id number]  Take 1 capsule (500 mg) by mouth twice a day.   Patient not taking: Reported on 3/5/2025    Historical Provider, MD  Active   aspirin 81 mg chewable tablet [id number]  Chew 1 tablet (81 mg) once daily. DARA Reed  Active   carvedilol (Coreg) 6.25 mg tablet [id number]  Take 1 tablet (6.25 mg) by mouth 2 times a day. DARA Elder  Active   clopidogrel (Plavix) 75 mg tablet [id number]  Take 1 tablet (75 mg) by mouth once daily. DARA Elder  Active   estradiol (Estrace) 0.01 % (0.1 mg/gram) vaginal cream [id number] No Insert into the vagina 3 times a week. Historical Provider, MD Past Month Active   isosorbide mononitrate ER (Imdur) 30 mg 24 hr tablet [id number]  Take 1 tablet (30 mg) by mouth once daily. Do not crush or chew. DARA Elder  Active   levothyroxine (Synthroid, Levoxyl) 125 mcg tablet [id number] No Take 1 tablet (125 mcg) by mouth once daily. Historical Provider, MD 2/12/2025 Morning Active   lidocaine (Lidoderm) 5 % patch [id number]  Place 1 patch over 12 hours on the skin once daily. Remove & discard patch  within 12 hours. DARA Elder  Active   nitroglycerin (Nitrostat) 0.3 mg SL tablet [id number]  Place 1 tablet (0.3 mg) under the tongue every 5 minutes if needed for chest pain (up to 3). DARA Sparks  Active   polyethylene glycol (Glycolax, Miralax) 17 gram packet [id number]  Take 17 g by mouth once daily as needed (constipation). DARA Reed  Active   ranolazine (Ranexa) 500 mg 12 hr tablet [id number]  Take 1 tablet (500 mg) by mouth 2 times a day. Do not crush, chew, or split. Wood Turner MD  Active   rosuvastatin (Crestor) 40 mg tablet [id number]  Take 1 tablet (40 mg) by mouth once daily. DARA Elder  Active                                 Medication compliance: No, Pt stopped taking Coreg due to low BP.  Pt was advised to let Dr. Turner know.   Uses pill box/organizer: No    Carries medication list: Yes     Blood Pressure Management  History of Hypertension: Yes   Medication Changes: No   Resting BP:  pre-ex. 134/60; post-ex 90/60     Heart Failure Management  Hx of Heart Failure: No    Smoking/Tobacco Assessment  Tobacco Use History[1]    Other Core Component Plan    Goal Status: In progress    Other Core Component Goals: Medication compliance, Verbalize medication usage and drug actions by discharge, Verbalize SL NTG action and proper dosage by discharge, and Achieve resting BP of < 130/80 by discharge    Other Core Component Interventions/Education:   5/15/2025  Education on sodium intake and consequences of high blood pressure was done during today's session. Discussed with patient the risks of excess levels of sodium and how to decrease dietary intake with provided list of substitutions. Handouts were given for home reference.        Other Core Components Reassessment: in progress    Individual Patient Goals:    Get back to playing golf by the end of June or beginning of July.  Go up a flight of stairs without feeling sob by discharge.    Goal Status: In  progress    Staff Comments:  Ms. Santoyo has completed 5 sessions of Cardiac Rehab.  She only attends 2 days a week due to copay.  She is making progress towards her goals and outcomes.  She is tolerating increasing exercise intensity well.  She has received some education on her risk factor modification.  She voiced no cardiac complaints and no ectopy was noted on the monitor.    Rehab Staff Signature: Desire Flaherty RN             [1]   Social History  Tobacco Use   Smoking Status Never   Smokeless Tobacco Never

## 2025-05-22 ENCOUNTER — CLINICAL SUPPORT (OUTPATIENT)
Dept: CARDIAC REHAB | Facility: CLINIC | Age: 75
End: 2025-05-22
Payer: MEDICARE

## 2025-05-22 DIAGNOSIS — I25.810 CORONARY ARTERY DISEASE INVOLVING CORONARY BYPASS GRAFT OF NATIVE HEART WITHOUT ANGINA PECTORIS: ICD-10-CM

## 2025-05-22 PROCEDURE — 93798 PHYS/QHP OP CAR RHAB W/ECG: CPT | Performed by: INTERNAL MEDICINE

## 2025-05-22 NOTE — PROGRESS NOTES
Cardiac Rehab Education  Tracy Santoyo   [id number]    5/22/2025  Resistance training education was done. Resistance bands were measured and cut for patient to begin home strength training exercises. Frequency, intensity/RPE, and form were reviewed with educational handout given for further reference. Patient was encouraged to comeback with any questions.      Signature Deb Jimenez RN

## 2025-05-27 ENCOUNTER — CLINICAL SUPPORT (OUTPATIENT)
Dept: CARDIAC REHAB | Facility: CLINIC | Age: 75
End: 2025-05-27
Payer: MEDICARE

## 2025-05-27 DIAGNOSIS — I25.810 CORONARY ARTERY DISEASE INVOLVING CORONARY BYPASS GRAFT OF NATIVE HEART WITHOUT ANGINA PECTORIS: ICD-10-CM

## 2025-05-27 PROCEDURE — 93798 PHYS/QHP OP CAR RHAB W/ECG: CPT | Performed by: INTERNAL MEDICINE

## 2025-05-27 NOTE — PROGRESS NOTES
Tracy Santoyo  1950  [id number]  74 y.o.    [facility] [place] [id number]      Cardiac/Pulmonary Rehab Nutrition Education    5/27/2025  Patient attended Heart Healthy Diet lecture with program RDN during today's exercise session. Heart Healthy diet tips sheet was given for further review at home and patient was encouraged to come back with any follow up questions.  Gave patient handout on Recipes website list.     Signature Tara Markham RDN, LD

## 2025-05-29 ENCOUNTER — CLINICAL SUPPORT (OUTPATIENT)
Dept: CARDIAC REHAB | Facility: CLINIC | Age: 75
End: 2025-05-29
Payer: MEDICARE

## 2025-05-29 DIAGNOSIS — I25.810 CORONARY ARTERY DISEASE INVOLVING CORONARY BYPASS GRAFT OF NATIVE HEART WITHOUT ANGINA PECTORIS: ICD-10-CM

## 2025-05-29 PROCEDURE — 93798 PHYS/QHP OP CAR RHAB W/ECG: CPT | Performed by: INTERNAL MEDICINE

## 2025-06-03 ENCOUNTER — CLINICAL SUPPORT (OUTPATIENT)
Dept: CARDIAC REHAB | Facility: CLINIC | Age: 75
End: 2025-06-03
Payer: MEDICARE

## 2025-06-03 DIAGNOSIS — I25.810 CORONARY ARTERY DISEASE INVOLVING CORONARY BYPASS GRAFT OF NATIVE HEART WITHOUT ANGINA PECTORIS: ICD-10-CM

## 2025-06-03 PROCEDURE — 93798 PHYS/QHP OP CAR RHAB W/ECG: CPT | Performed by: INTERNAL MEDICINE

## 2025-06-05 ENCOUNTER — CLINICAL SUPPORT (OUTPATIENT)
Dept: CARDIAC REHAB | Facility: CLINIC | Age: 75
End: 2025-06-05
Payer: MEDICARE

## 2025-06-05 DIAGNOSIS — I25.810 CORONARY ARTERY DISEASE INVOLVING CORONARY BYPASS GRAFT OF NATIVE HEART WITHOUT ANGINA PECTORIS: ICD-10-CM

## 2025-06-05 PROCEDURE — 93798 PHYS/QHP OP CAR RHAB W/ECG: CPT | Performed by: INTERNAL MEDICINE

## 2025-06-10 ENCOUNTER — CLINICAL SUPPORT (OUTPATIENT)
Dept: CARDIAC REHAB | Facility: CLINIC | Age: 75
End: 2025-06-10
Payer: MEDICARE

## 2025-06-10 DIAGNOSIS — I25.810 CORONARY ARTERY DISEASE INVOLVING CORONARY BYPASS GRAFT OF NATIVE HEART WITHOUT ANGINA PECTORIS: ICD-10-CM

## 2025-06-10 PROCEDURE — 93798 PHYS/QHP OP CAR RHAB W/ECG: CPT | Performed by: INTERNAL MEDICINE

## 2025-06-10 NOTE — PROGRESS NOTES
Cardiac Rehab Education  Tracy Santoyo   [id number]    6/10/2025  Exercise prescription and maintenance education was done during today's session. Discussed FITT principle, reviewed perceived exertion scale, and patient's THR was given for home practice with instructions on how to obtain. Handouts were given for personal reference. Home exercise prescription is to be given prior to discharge from program.      Signature Desire Flaherty RN

## 2025-06-12 ENCOUNTER — APPOINTMENT (OUTPATIENT)
Dept: CARDIAC REHAB | Facility: CLINIC | Age: 75
End: 2025-06-12
Payer: MEDICARE

## 2025-06-13 ENCOUNTER — CLINICAL SUPPORT (OUTPATIENT)
Dept: CARDIAC REHAB | Facility: CLINIC | Age: 75
End: 2025-06-13
Payer: MEDICARE

## 2025-06-13 DIAGNOSIS — I25.810 CORONARY ARTERY DISEASE INVOLVING CORONARY BYPASS GRAFT OF NATIVE HEART WITHOUT ANGINA PECTORIS: ICD-10-CM

## 2025-06-13 PROCEDURE — 93798 PHYS/QHP OP CAR RHAB W/ECG: CPT | Performed by: INTERNAL MEDICINE

## 2025-06-16 ENCOUNTER — DOCUMENTATION (OUTPATIENT)
Dept: CARDIAC REHAB | Facility: CLINIC | Age: 75
End: 2025-06-16
Payer: MEDICARE

## 2025-06-16 NOTE — PROGRESS NOTES
Cardiac Rehabilitation 60 Day Reassessment    Name: Tracy Santoyo  Medical Record Number: [id number]  YOB: 1950  Age: 74 y.o.    Today’s Date: 6/16/2025  Primary Care Physician: Marquez Duncan DO  Referring Physician: Wood Turner MD  Program Location: [address]  Primary Diagnosis:   1. Hypercholesteremia  Referral to Cardiac Rehab       2. Coronary artery disease involving coronary bypass graft of native heart without angina pectoris  Referral to Cardiac Rehab        Onset/Date of Diagnosis: 2/12/2025    Sessions # 12    AACVPR Risk Stratification:  Moderate     Falls Risk: Medium  Psychosocial Assessment     Pre PHQ-9: 3       Sent PH-Q 9 to MD if score > 20: No; score < 20    Pt reported/currently experiencing stress: No  Patient uses stress management skills: No   History of: no history of anxiety or depression  Currently seeing a mental health provider: No  Social Support: Yes, Whom:, Greyson Hallman  Quality of Life Survey: SF-36   SF-36 Pre Post   Physical Component Score     Mental Component Score       Learning Assessment:  Learning assessment/barriers: None  Preferred learning method: Auditory and Visual  Barriers: None  Comments:    Stages of Change:Action    Psychosocial Plan    Goal Status: In progress    Psychosocial Goals: Maintain or lower PH-Q 9 score by discharge    Psychosocial Interventions/Education:   4/24/2025 Initial PH-Q 9 reviewed.  5/20/25 Pt reports no new stress.   6/16/2025 No change in psychosocial.    Psychosocial Reassessment: in progress    Nutrition Assessment:    Hyperlipidemia: Yes     Lipids:   Lab Results   Component Value Date    CHOL 140 09/27/2024    HDL 58.2 09/27/2024    LDLF 67 09/27/2024    TRIG 74 09/27/2024       Current Dietary Guidelines: Low fat, Low sodium  Barriers to dietary change: no    Diet Habit Survey: Picture Your Plate  Pre: 72  Post: To be done at discharge.    Diabetes Assessment    No results found for:  "\"HGBA1C\"    History of Diabetes: No    Weight Management    Height: 167.6 cm (5' 6\")  Weight: 68 kg (150 lb)  BMI (Calculated): 24.22      Nutrition Plan    Goal Status: In progress    Nutrition Goals: Lipid Goal: HDL>45, LDL <70, Total <180, Trigs <150, Improve Diet Habit Survey score by 5-10 points by discharge, Adapt a low-sodium, DASH diet prior to discharge, Adapt a Mediterranean focused diet prior to discharge, and Learn how to read and interpret nutrition labels prior to discharge    Nutrition Interventions/Education:   5/15/2025 Diet Habit Survey results reviewed with pt.  Dietitian's comments and recommendations given to Pt for review.    Cardiac/Pulmonary Rehab Nutrition Education    5/27/2025  Patient attended Heart Healthy Diet lecture with program RDN during today's exercise session. Heart Healthy diet tips sheet was given for further review at home and patient was encouraged to come back with any follow up questions.  Gave patient handout on Recipes website list.     Signature Tara Markham RDN, LD    6/3/2025 Cardiac booklet given and lipids reviewed.       Nutrition Reassessment: in progress    Exercise Assessment    Home Exercise: No  Mode: NA  Frequency: NA  Duration: NA    Exercise Prescription     Exercise Prescription based on: Duke Activity Status Index (DASI)    DASI Score: 18.95   MET Score: 5.1   Frequency:  2 days/week   Mode: Treadmill, NuStep, and Recumbent Cycle   Duration: 36 total aerobic minutes   Intensity: RPE 12-16  Target HR:    MET Level: 3.2  Patient wears supplemental O2: No     Modality Workload METs Duration (minutes)   1 Pre-Exercise      2 Treadmill 2.4 mph @ 1 % 3.2 12 :00   3 NuStep  [address]  @ Load 7 3.0 12 :00   4 Recumbent Bike 6 Load @ 55 rpms  3.2 12 :00   5 Cooldown    5 :00   6 Post-Exercise        Resistance Training: Pt reports that she does 3 lbs weight training and band exercise at home.  Home Exercise Prescription given: Yes    Exercise Plan    Goal " Status: In progress  Exercise Goals: Increase total exercise duration to 30-45 minutes and Establish a home exercise program before discharge    Exercise Interventions/Education:   5/22/2025 Resistance training education was done. Resistance bands were measured and cut for patient to begin home strength training exercises. Frequency, intensity/RPE, and form were reviewed with educational handout given for further reference. Patient was encouraged to comeback with any questions.     6/10/2025  Exercise prescription and maintenance education was done during today's session. Discussed FITT principle, reviewed perceived exertion scale, and patient's THR was given for home practice with instructions on how to obtain. Handouts were given for personal reference. Home exercise prescription is to be given prior to discharge from program.        Exercise Reassessment: in progress    Other Core Components/Risk Factor Assessment:    Medication adherence  Current Medications:   Medication Documentation Review Audit       Reviewed by Wood Turner MD (Physician) on 04/10/25 at 1220      Medication Order Taking? Sig Documenting Provider Last Dose Status   acetaminophen (Tylenol) 325 mg tablet [id number]  Take 2 tablets (650 mg) by mouth every 6 hours if needed for mild pain (1 - 3) (surgical pain). DARA Reed  Active   amiodarone (Pacerone) 400 mg tablet [id number]  Take 1 tablet (400 mg) by mouth 2 times a day for 6 days, THEN 0.5 tablets (200 mg) once daily. DARA Reed  Active   amLODIPine (Norvasc) 5 mg tablet [id number]  Take 1 tablet (5 mg) by mouth once daily. DARA Elder  Active   amoxicillin (Amoxil) 500 mg capsule [id number]  Take 1 capsule (500 mg) by mouth twice a day.   Patient not taking: Reported on 3/5/2025    Historical Provider, MD  Active   aspirin 81 mg chewable tablet [id number]  Chew 1 tablet (81 mg) once daily. DARA Reed  Active   carvedilol  (Coreg) 6.25 mg tablet [id number]  Take 1 tablet (6.25 mg) by mouth 2 times a day. DARA Elder  Active   clopidogrel (Plavix) 75 mg tablet [id number]  Take 1 tablet (75 mg) by mouth once daily. DARA Elder  Active   estradiol (Estrace) 0.01 % (0.1 mg/gram) vaginal cream [id number] No Insert into the vagina 3 times a week. Historical Provider, MD Past Month Active   isosorbide mononitrate ER (Imdur) 30 mg 24 hr tablet [id number]  Take 1 tablet (30 mg) by mouth once daily. Do not crush or chew. DARA Elder  Active   levothyroxine (Synthroid, Levoxyl) 125 mcg tablet [id number] No Take 1 tablet (125 mcg) by mouth once daily. Historical Provider, MD 2/12/2025 Morning Active   lidocaine (Lidoderm) 5 % patch [id number]  Place 1 patch over 12 hours on the skin once daily. Remove & discard patch within 12 hours. DARA Elder  Active   nitroglycerin (Nitrostat) 0.3 mg SL tablet [id number]  Place 1 tablet (0.3 mg) under the tongue every 5 minutes if needed for chest pain (up to 3). DARA Sparks  Active   polyethylene glycol (Glycolax, Miralax) 17 gram packet [id number]  Take 17 g by mouth once daily as needed (constipation). DARA Reed  Active   ranolazine (Ranexa) 500 mg 12 hr tablet [id number]  Take 1 tablet (500 mg) by mouth 2 times a day. Do not crush, chew, or split. Wood Turner MD  Active   rosuvastatin (Crestor) 40 mg tablet [id number]  Take 1 tablet (40 mg) by mouth once daily. DARA Elder  Active                                 Medication compliance: No, Pt stopped taking Coreg due to low BP.  Pt was advised to let Dr. Turner know.   Uses pill box/organizer: No    Carries medication list: Yes     Blood Pressure Management  History of Hypertension: Yes   Medication Changes: No   Resting BP:  pre-ex. 134/60; post-ex 90/60     Heart Failure Management  Hx of Heart Failure: No    Smoking/Tobacco Assessment  Tobacco Use  History[1]    Other Core Component Plan    Goal Status: In progress    Other Core Component Goals: Medication compliance, Verbalize medication usage and drug actions by discharge, Verbalize SL NTG action and proper dosage by discharge, and Achieve resting BP of < 130/80 by discharge    Other Core Component Interventions/Education:   5/15/2025  Education on sodium intake and consequences of high blood pressure was done during today's session. Discussed with patient the risks of excess levels of sodium and how to decrease dietary intake with provided list of substitutions. Handouts were given for home reference.      6/17/2025 Coronary artery disease education was done during today's session. Signs and symptoms of possible heart attack, risk factors for prevention, and lifestyle behavior modifications were all explained. Handout for personal use was given.        Other Core Components Reassessment: in progress    Individual Patient Goals:    Get back to playing golf by the end of June or beginning of July.  Go up a flight of stairs without feeling sob by discharge.    Goal Status: In progress    Staff Comments:  Ms. Santoyo has completed 12 sessions of Cardiac Rehab.  She only attends 2 days a week due to copay.  She continues to make progress towards her goals and outcomes.  She continues to tolerate increasing exercise intensity and duration well.  She is encouraged to exercise on days not at rehab to increase her strength and endurance.  She reports that she does 3 lbs weight training and band exercise at home.  She has received some education on her diet, exercise and CAD.  She has no complaints of chest pain and no ectopy was noted on the monitor.    Rehab Staff Signature: Desire Flaherty RN             [1]   Social History  Tobacco Use   Smoking Status Never   Smokeless Tobacco Never

## 2025-06-17 ENCOUNTER — CLINICAL SUPPORT (OUTPATIENT)
Dept: CARDIAC REHAB | Facility: CLINIC | Age: 75
End: 2025-06-17
Payer: MEDICARE

## 2025-06-17 DIAGNOSIS — I25.810 CORONARY ARTERY DISEASE INVOLVING CORONARY BYPASS GRAFT OF NATIVE HEART WITHOUT ANGINA PECTORIS: ICD-10-CM

## 2025-06-17 PROCEDURE — 93798 PHYS/QHP OP CAR RHAB W/ECG: CPT | Performed by: INTERNAL MEDICINE

## 2025-06-17 NOTE — PROGRESS NOTES
Cardiac Rehab Education  Tracy Santoyo   [id number]    6/17/2025  Coronary artery disease education was done during today's session. Signs and symptoms of possible heart attack, risk factors for prevention, and lifestyle behavior modifications were all explained. Handout for personal use was given.      Signature Desire Flaherty RN

## 2025-06-18 ENCOUNTER — TELEPHONE (OUTPATIENT)
Dept: CARDIOLOGY | Facility: CLINIC | Age: 75
End: 2025-06-18
Payer: MEDICARE

## 2025-06-18 DIAGNOSIS — I48.0 PAF (PAROXYSMAL ATRIAL FIBRILLATION) (MULTI): ICD-10-CM

## 2025-06-18 DIAGNOSIS — R94.31 ABNORMAL ELECTROCARDIOGRAM (ECG) (EKG): Primary | ICD-10-CM

## 2025-06-18 NOTE — TELEPHONE ENCOUNTER
----- Message from Wood Turner sent at 6/18/2025  8:19 AM EDT -----  Come in for EKG and 7 day Holter.  Looks like SR on tele strips at rehab.  ----- Message -----  From: Desire Flaherty RN  Sent: 6/13/2025   3:16 PM EDT  To: Wood Turner MD    Hi Dr. Turner,    Can you please review Ms. Santoyo's monitor strips from Cardiac Rehab on 6/10 and 6/13.  Is she in a.fib?    Thank you,  [place]

## 2025-06-19 ENCOUNTER — CLINICAL SUPPORT (OUTPATIENT)
Dept: CARDIAC REHAB | Facility: CLINIC | Age: 75
End: 2025-06-19
Payer: MEDICARE

## 2025-06-19 DIAGNOSIS — I25.810 CORONARY ARTERY DISEASE INVOLVING CORONARY BYPASS GRAFT OF NATIVE HEART WITHOUT ANGINA PECTORIS: ICD-10-CM

## 2025-06-19 PROCEDURE — 93798 PHYS/QHP OP CAR RHAB W/ECG: CPT | Performed by: INTERNAL MEDICINE

## 2025-06-23 DIAGNOSIS — I49.3 PVC'S (PREMATURE VENTRICULAR CONTRACTIONS): Primary | ICD-10-CM

## 2025-06-24 ENCOUNTER — HOSPITAL ENCOUNTER (OUTPATIENT)
Dept: CARDIOLOGY | Facility: CLINIC | Age: 75
Discharge: HOME | End: 2025-06-24
Payer: MEDICARE

## 2025-06-24 ENCOUNTER — CLINICAL SUPPORT (OUTPATIENT)
Dept: CARDIAC REHAB | Facility: CLINIC | Age: 75
End: 2025-06-24
Payer: MEDICARE

## 2025-06-24 DIAGNOSIS — I48.0 PAF (PAROXYSMAL ATRIAL FIBRILLATION) (MULTI): ICD-10-CM

## 2025-06-24 DIAGNOSIS — I25.810 CORONARY ARTERY DISEASE INVOLVING CORONARY BYPASS GRAFT OF NATIVE HEART WITHOUT ANGINA PECTORIS: ICD-10-CM

## 2025-06-24 DIAGNOSIS — R94.31 ABNORMAL ELECTROCARDIOGRAM (ECG) (EKG): ICD-10-CM

## 2025-06-24 LAB
ATRIAL RATE: 94 BPM
P AXIS: 37 DEGREES
P OFFSET: 186 MS
P ONSET: 139 MS
PR INTERVAL: 174 MS
Q ONSET: 226 MS
QRS COUNT: 16 BEATS
QRS DURATION: 70 MS
QT INTERVAL: 370 MS
QTC CALCULATION(BAZETT): 462 MS
QTC FREDERICIA: 429 MS
R AXIS: 33 DEGREES
T AXIS: -10 DEGREES
T OFFSET: 411 MS
VENTRICULAR RATE: 94 BPM

## 2025-06-24 PROCEDURE — 93242 EXT ECG>48HR<7D RECORDING: CPT

## 2025-06-24 PROCEDURE — 93005 ELECTROCARDIOGRAM TRACING: CPT

## 2025-06-24 NOTE — PROGRESS NOTES
Tracy Santoyo  1950  [id number]  74 y.o.    [facility] [id number]      Cardiac/Pulmonary Rehab Nutrition Education    6/24/2025  Label reading education was done with program's RDN during today's visit. Label Reading Highlights handout was given with information discussed during appointment and to assist in review of label contents at home.      Signature Tara Markham RDN, LD

## 2025-06-26 ENCOUNTER — CLINICAL SUPPORT (OUTPATIENT)
Dept: CARDIAC REHAB | Facility: CLINIC | Age: 75
End: 2025-06-26
Payer: MEDICARE

## 2025-06-26 DIAGNOSIS — I25.810 CORONARY ARTERY DISEASE INVOLVING CORONARY BYPASS GRAFT OF NATIVE HEART WITHOUT ANGINA PECTORIS: ICD-10-CM

## 2025-06-26 PROCEDURE — 93798 PHYS/QHP OP CAR RHAB W/ECG: CPT | Performed by: INTERNAL MEDICINE

## 2025-06-26 NOTE — PROGRESS NOTES
Cardiac Rehab Education  Tracy Santoyo   [id number]    6/26/2025  Effects of stress on the body, risks of unmanaged stress and techniques to minimize anxieties were all discussed in today's session. Handout was given for reference with resources to  Behavioral Health Services should patient need further assistance with stress management. Patient encouraged to notify staff if stressors increase and additional help is required.      Signature Deb Jimenez RN

## 2025-07-01 ENCOUNTER — CLINICAL SUPPORT (OUTPATIENT)
Dept: CARDIAC REHAB | Facility: CLINIC | Age: 75
End: 2025-07-01
Payer: MEDICARE

## 2025-07-01 DIAGNOSIS — I25.810 CORONARY ARTERY DISEASE INVOLVING CORONARY BYPASS GRAFT OF NATIVE HEART WITHOUT ANGINA PECTORIS: ICD-10-CM

## 2025-07-01 PROCEDURE — 93798 PHYS/QHP OP CAR RHAB W/ECG: CPT | Performed by: INTERNAL MEDICINE

## 2025-07-03 ENCOUNTER — APPOINTMENT (OUTPATIENT)
Dept: CARDIAC REHAB | Facility: CLINIC | Age: 75
End: 2025-07-03
Payer: MEDICARE

## 2025-07-08 ENCOUNTER — APPOINTMENT (OUTPATIENT)
Dept: CARDIAC REHAB | Facility: CLINIC | Age: 75
End: 2025-07-08
Payer: MEDICARE

## 2025-07-10 ENCOUNTER — DOCUMENTATION (OUTPATIENT)
Dept: CARDIAC REHAB | Facility: CLINIC | Age: 75
End: 2025-07-10

## 2025-07-10 ENCOUNTER — CLINICAL SUPPORT (OUTPATIENT)
Dept: CARDIAC REHAB | Facility: CLINIC | Age: 75
End: 2025-07-10
Payer: MEDICARE

## 2025-07-10 DIAGNOSIS — I25.810 CORONARY ARTERY DISEASE INVOLVING CORONARY BYPASS GRAFT OF NATIVE HEART WITHOUT ANGINA PECTORIS: ICD-10-CM

## 2025-07-10 PROCEDURE — 93798 PHYS/QHP OP CAR RHAB W/ECG: CPT | Performed by: INTERNAL MEDICINE

## 2025-07-10 NOTE — PROGRESS NOTES
Cardiac Rehabilitation 90 Day Reassessment    Name: Tracy Santoyo  Medical Record Number: [id number]  YOB: 1950  Age: 74 y.o.    Today’s Date: 7/10/2025  Primary Care Physician: Marquez Duncan DO  Referring Physician: Wood Turner MD  Program Location: [address]     General  Primary Diagnosis:   1. Hypercholesteremia  Referral to Cardiac Rehab       2. Coronary artery disease involving coronary bypass graft of native heart without angina pectoris  Referral to Cardiac Rehab        Onset/Date of Diagnosis: 2/12/2025    Sessions # 18    AACVPR Risk Stratification:  Moderate     Falls Risk: Medium  Psychosocial Assessment     Pre PHQ-9: 3       Sent PH-Q 9 to MD if score > 20: No; score < 20    Pt reported/currently experiencing stress: No  Patient uses stress management skills: No   History of: no history of anxiety or depression  Currently seeing a mental health provider: No  Social Support: Yes, Whom:, Greyson Hallman  Quality of Life Survey: SF-36   SF-36 Pre Post   Physical Component Score     Mental Component Score       Learning Assessment:  Learning assessment/barriers: None  Preferred learning method: Auditory and Visual  Barriers: None  Comments:    Stages of Change:Action    Psychosocial Plan    Goal Status: In progress    Psychosocial Goals: Maintain or lower PH-Q 9 score by discharge    Psychosocial Interventions/Education:   4/24/2025 Initial PH-Q 9 reviewed.  5/20/25 Pt reports no new stress.     6/16/2025 No change in psychosocial.  6/26/2025  Effects of stress on the body, risks of unmanaged stress and techniques to minimize anxieties were all discussed in today's session. Handout was given for reference with resources to  Behavioral Health Services should patient need further assistance with stress management. Patient encouraged to notify staff if stressors increase and additional help is required.      7/10/2025 Pt in good spirits    Psychosocial Reassessment: in  "progress    Nutrition Assessment:    Hyperlipidemia: Yes     Lipids:   Lab Results   Component Value Date    CHOL 140 09/27/2024    HDL 58.2 09/27/2024    LDLF 67 09/27/2024    TRIG 74 09/27/2024       Current Dietary Guidelines: Low fat, Low sodium  Barriers to dietary change: no    Diet Habit Survey: Picture Your Plate  Pre: 72  Post: To be done at discharge.    Diabetes Assessment    No results found for: \"HGBA1C\"    History of Diabetes: No    Weight Management    Height: 167.6 cm (5' 6\")  Weight: 68 kg (150 lb)  BMI (Calculated): 24.22      Nutrition Plan    Goal Status: In progress    Nutrition Goals: Lipid Goal: HDL>45, LDL <70, Total <180, Trigs <150, Improve Diet Habit Survey score by 5-10 points by discharge, Adapt a low-sodium, DASH diet prior to discharge, Adapt a Mediterranean focused diet prior to discharge, and Learn how to read and interpret nutrition labels prior to discharge    Nutrition Interventions/Education:   5/15/2025 Diet Habit Survey results reviewed with pt.  Dietitian's comments and recommendations given to Pt for review.    Cardiac/Pulmonary Rehab Nutrition Education    5/27/2025  Patient attended Heart Healthy Diet lecture with program RDN during today's exercise session. Heart Healthy diet tips sheet was given for further review at home and patient was encouraged to come back with any follow up questions.  Gave patient handout on Recipes website list.     Signature Tara Markham RDN, BETH    6/3/2025 Cardiac booklet given and lipids reviewed.     Cardiac/Pulmonary Rehab Nutrition Education    6/24/2025  Label reading education was done with program's RDN during today's visit. Label Reading Highlights handout was given with information discussed during appointment and to assist in review of label contents at home.      Signature Tara Markham RDN, BETH      Nutrition Reassessment: in progress    Exercise Assessment    Home Exercise: No  Mode: NA  Frequency: NA  Duration: " NA    Exercise Prescription     Exercise Prescription based on: Duke Activity Status Index (DASI)    DASI Score: 18.95   MET Score: 5.1   Frequency:  2 days/week   Mode: Treadmill, NuStep, and Recumbent Cycle   Duration: 45 total aerobic minutes   Intensity: RPE 12-16  Target HR:    MET Level: 4.2  Patient wears supplemental O2: No     Modality Workload METs Duration (minutes)   1 Pre-Exercise      2 Treadmill 2.5 mph @ 2 % 3.6 15 :00   3 NuStep  [address]  @ Load 8 3.1 15 :00   4 Recumbent Bike 7 Load @ 60 rpms  4.2 15 :00   5 Cooldown    5 :00   6 Post-Exercise        Resistance Training: Pt reports that she does 3 lbs weight training and band exercise at home.  Home Exercise Prescription given: Yes    Exercise Plan    Goal Status: In progress  Exercise Goals: Increase total exercise duration to 30-45 minutes and Establish a home exercise program before discharge    Exercise Interventions/Education:   5/22/2025 Resistance training education was done. Resistance bands were measured and cut for patient to begin home strength training exercises. Frequency, intensity/RPE, and form were reviewed with educational handout given for further reference. Patient was encouraged to comeback with any questions.     6/10/2025  Exercise prescription and maintenance education was done during today's session. Discussed FITT principle, reviewed perceived exertion scale, and patient's THR was given for home practice with instructions on how to obtain. Handouts were given for personal reference. Home exercise prescription is to be given prior to discharge from program.      7/10/2025 Increased exercise duration and intensity on TM/NS/RB based on pt's HR/BP/RPE response to exercise.    Exercise Reassessment: in progress    Other Core Components/Risk Factor Assessment:    Medication adherence  Current Medications:   Medication Documentation Review Audit       Reviewed by Wood Turner MD (Physician) on 04/10/25 at 1220       Medication Order Taking? Sig Documenting Provider Last Dose Status   acetaminophen (Tylenol) 325 mg tablet [id number]  Take 2 tablets (650 mg) by mouth every 6 hours if needed for mild pain (1 - 3) (surgical pain). DARA Reed  Active   amiodarone (Pacerone) 400 mg tablet [id number]  Take 1 tablet (400 mg) by mouth 2 times a day for 6 days, THEN 0.5 tablets (200 mg) once daily. DARA Reed  Active   amLODIPine (Norvasc) 5 mg tablet [id number]  Take 1 tablet (5 mg) by mouth once daily. DARA Elder  Active   amoxicillin (Amoxil) 500 mg capsule [id number]  Take 1 capsule (500 mg) by mouth twice a day.   Patient not taking: Reported on 3/5/2025    Historical Provider, MD  Active   aspirin 81 mg chewable tablet [id number]  Chew 1 tablet (81 mg) once daily. DARA Reed  Active   carvedilol (Coreg) 6.25 mg tablet [id number]  Take 1 tablet (6.25 mg) by mouth 2 times a day. DARA Elder  Active   clopidogrel (Plavix) 75 mg tablet [id number]  Take 1 tablet (75 mg) by mouth once daily. DARA Elder  Active   estradiol (Estrace) 0.01 % (0.1 mg/gram) vaginal cream [id number] No Insert into the vagina 3 times a week. Historical Provider, MD Past Month Active   isosorbide mononitrate ER (Imdur) 30 mg 24 hr tablet [id number]  Take 1 tablet (30 mg) by mouth once daily. Do not crush or chew. DARA Elder  Active   levothyroxine (Synthroid, Levoxyl) 125 mcg tablet [id number] No Take 1 tablet (125 mcg) by mouth once daily. Historical Provider, MD 2/12/2025 Morning Active   lidocaine (Lidoderm) 5 % patch [id number]  Place 1 patch over 12 hours on the skin once daily. Remove & discard patch within 12 hours. DARA Elder  Active   nitroglycerin (Nitrostat) 0.3 mg SL tablet [id number]  Place 1 tablet (0.3 mg) under the tongue every 5 minutes if needed for chest pain (up to 3). Emma St APRN-CNP  Active   polyethylene glycol  (Glycolax, Miralax) 17 gram packet [id number]  Take 17 g by mouth once daily as needed (constipation). [facility]  Active   ranolazine (Ranexa) 500 mg 12 hr tablet [id number]  Take 1 tablet (500 mg) by mouth 2 times a day. Do not crush, chew, or split. Wood Turner MD  Active   rosuvastatin (Crestor) 40 mg tablet [id number]  Take 1 tablet (40 mg) by mouth once daily. JAYLAN Elder-CNP  Active                                 Medication compliance: No, Pt stopped taking Coreg due to low BP.  Pt was advised to let Dr. Turner know.   Uses pill box/organizer: No    Carries medication list: Yes     Blood Pressure Management  History of Hypertension: Yes   Medication Changes: No   Resting BP:  pre-ex. 134/60; post-ex 90/60     Heart Failure Management  Hx of Heart Failure: No    Smoking/Tobacco Assessment  Tobacco Use History[1]    Other Core Component Plan    Goal Status: In progress    Other Core Component Goals: Medication compliance, Verbalize medication usage and drug actions by discharge, Verbalize SL NTG action and proper dosage by discharge, and Achieve resting BP of < 130/80 by discharge    Other Core Component Interventions/Education:   5/15/2025  Education on sodium intake and consequences of high blood pressure was done during today's session. Discussed with patient the risks of excess levels of sodium and how to decrease dietary intake with provided list of substitutions. Handouts were given for home reference.      6/17/2025 Coronary artery disease education was done during today's session. Signs and symptoms of possible heart attack, risk factors for prevention, and lifestyle behavior modifications were all explained. Handout for personal use was given.      7/10/2025 Risk factors of cardiovascular disease were discussed today's. Education on modifiable and non-modifiable risk factors of CVD and recommendations to manage them was done today. Handouts were provided along with further  reference. Patient was educated to comeback with further questions.       Other Core Components Reassessment: in progress    Individual Patient Goals:    Get back to playing golf by the end of June or beginning of July.  Go up a flight of stairs without feeling sob by discharge.    Goal Status: In progress    Staff Comments:  Ms. Santoyo has completed 18 sessions of Cardiac Rehab.  Patient has increased intensity and duration on Treadmill, NuStep, and Recumbent Cycle modalities.  She has received some education on stress management, label reading and cardiovascular risk factors.  She reports that she feels tired.  PVCs noted on the monitor as well as in and out of A. Fib. Dr. Turner was notified.  Pt completed Holter monitoring.      Rehab Staff Signature: Desire Flaherty RN             [1]   Social History  Tobacco Use   Smoking Status Never   Smokeless Tobacco Never

## 2025-07-14 ENCOUNTER — OFFICE VISIT (OUTPATIENT)
Dept: CARDIOLOGY | Facility: CLINIC | Age: 75
End: 2025-07-14
Payer: MEDICARE

## 2025-07-14 VITALS
DIASTOLIC BLOOD PRESSURE: 69 MMHG | BODY MASS INDEX: 25.18 KG/M2 | HEART RATE: 81 BPM | SYSTOLIC BLOOD PRESSURE: 138 MMHG | WEIGHT: 156 LBS

## 2025-07-14 DIAGNOSIS — E78.2 MIXED HYPERLIPIDEMIA: ICD-10-CM

## 2025-07-14 DIAGNOSIS — I47.10 PSVT (PAROXYSMAL SUPRAVENTRICULAR TACHYCARDIA): ICD-10-CM

## 2025-07-14 DIAGNOSIS — I49.1 PAC (PREMATURE ATRIAL CONTRACTION): Primary | ICD-10-CM

## 2025-07-14 DIAGNOSIS — I25.810 CORONARY ARTERY DISEASE INVOLVING CORONARY BYPASS GRAFT OF NATIVE HEART WITHOUT ANGINA PECTORIS: ICD-10-CM

## 2025-07-14 DIAGNOSIS — E78.00 HYPERCHOLESTEREMIA: ICD-10-CM

## 2025-07-14 PROBLEM — I49.3 PREMATURE VENTRICULAR BEATS: Status: RESOLVED | Noted: 2024-07-31 | Resolved: 2025-07-14

## 2025-07-14 PROCEDURE — 1036F TOBACCO NON-USER: CPT | Performed by: INTERNAL MEDICINE

## 2025-07-14 PROCEDURE — 3075F SYST BP GE 130 - 139MM HG: CPT | Performed by: INTERNAL MEDICINE

## 2025-07-14 PROCEDURE — 3078F DIAST BP <80 MM HG: CPT | Performed by: INTERNAL MEDICINE

## 2025-07-14 PROCEDURE — 99212 OFFICE O/P EST SF 10 MIN: CPT

## 2025-07-14 PROCEDURE — 1160F RVW MEDS BY RX/DR IN RCRD: CPT | Performed by: INTERNAL MEDICINE

## 2025-07-14 PROCEDURE — 1159F MED LIST DOCD IN RCRD: CPT | Performed by: INTERNAL MEDICINE

## 2025-07-14 PROCEDURE — 99214 OFFICE O/P EST MOD 30 MIN: CPT | Performed by: INTERNAL MEDICINE

## 2025-07-14 RX ORDER — DILTIAZEM HYDROCHLORIDE 240 MG/1
240 CAPSULE, COATED, EXTENDED RELEASE ORAL DAILY
COMMUNITY

## 2025-07-14 NOTE — PROGRESS NOTES
Chief Complaint:   Coronary Artery Disease     History of Present Illness     Tracy Santoyo is a 74 y.o. female presenting with coronary artery disease.  She had a history of angina and underwent coronary artery bypass surgery (mid-CAB LIMA to LAD 2/12/25).  She had issues with post-operative CP and has been readmitted without MI.  Since then, she presents today with no further CP and only complains of nocturnal right arm pain.  No JOVEL.  The patient is tolerating guideline-directed medical therapy with antiplatelet medication and is compliant.  The patient walks regularly and follows a heart healthy diet.  The patient has been well since their last office appointment and is not having any anginal symptoms or dyspnea on exertion.      Having palpitations.  No recurrent AF since post-op period off amiodarone.  At cardiac rehab.  Holter frequent PVC's and non-sustained PSVT.  <1% PVC's. Resume Cardizem.    Review of Systems  All pertinent systems have been reviewed and are negative except for what is stated in the history of present illness.    All other systems have been reviewed and are negative and noncontributory to this patient's current ailments.  .       Previous History     Past Medical History:  She has a past medical history of Agat[place] coronary artery calcium score between 200 and 399 (02/12/2024), Angina pectoris, Chronic sinusitis, Coronary artery disease involving coronary bypass graft of native heart without angina pectoris (04/10/2025), Coronary artery disease involving native coronary artery of native heart without angina pectoris (04/02/2024), JOVEL (dyspnea on exertion) (01/15/2025), Encounter for fitting and adjustment of other specified devices (08/23/2022), Encounter for fitting and adjustment of other specified devices (12/02/2019), Essential hypertension (04/02/2024), Hypercholesteremia (04/02/2024), Hypertension, Hypothyroidism, Other chest pain (02/12/2024), PAC (premature atrial contraction)  (01/15/2025), Presence of urogenital implants (09/06/2022), PSVT (paroxysmal supraventricular tachycardia) (07/14/2025), and Trigeminal neuralgia.    Past Surgical History:  She has a past surgical history that includes Other surgical history (01/15/2019); Other surgical history (01/15/2019); Other surgical history (10/18/2022); Cardiac catheterization (N/A, 03/15/2024); Cardiac catheterization (N/A, 01/24/2025); Appendectomy (1950); Cholecystectomy (2010); Hysterectomy (1990); Colonoscopy; Sinus surgery (2012); Bladder suspension (1994); Liver surgery (2010); and Craniotomy (2011).      Social History:  She reports that she has never smoked. She has never used smokeless tobacco. She reports that she does not drink alcohol and does not use drugs.    Family History:  No family history on file.     Allergies:  Carbamazepine, Adhesive tape-silicones, Dilaudid [hydromorphone], and Metoprolol tartrate    Outpatient Medications:  Outpatient Medications:  Current Outpatient Medications   Medication Instructions    acetaminophen (TYLENOL) 650 mg, oral, Every 6 hours PRN    aspirin 81 mg, oral, Daily    clopidogrel (PLAVIX) 75 mg, oral, Daily    dilTIAZem CD (CARDIZEM CD) 240 mg, Daily    estradiol (Estrace) 0.01 % (0.1 mg/gram) vaginal cream 3 times weekly    levothyroxine (Synthroid, Levoxyl) 125 mcg tablet 1 tablet, Daily RT    nitroglycerin (NITROSTAT) 0.3 mg, sublingual, Every 5 min PRN    rosuvastatin (CRESTOR) 20 mg, oral, Daily         Physical Examination   Vitals:  Visit Vitals  /69 (BP Location: Right arm, Patient Position: Sitting, BP Cuff Size: Adult)   Pulse 81   Wt 70.8 kg (156 lb)   LMP  (LMP Unknown)   BMI 25.18 kg/m²   OB Status Postmenopausal   Smoking Status Never   BSA 1.82 m²       Physical Exam  Vitals reviewed.   Constitutional:       General: She is not in acute distress.     Appearance: Normal appearance.   HENT:      Head: Normocephalic and atraumatic.      Nose: Nose normal.   Eyes:       Conjunctiva/sclera: Conjunctivae normal.   Cardiovascular:      Rate and Rhythm: Normal rate and regular rhythm.      Pulses: Normal pulses.      Heart sounds: No murmur heard.  Pulmonary:      Effort: Pulmonary effort is normal. No respiratory distress.      Breath sounds: Normal breath sounds. No wheezing, rhonchi or rales.   Abdominal:      General: Bowel sounds are normal. There is no distension.      Palpations: Abdomen is soft.      Tenderness: There is no abdominal tenderness.   Musculoskeletal:         General: No swelling.      Right lower leg: No edema.      Left lower leg: No edema.   Skin:     General: Skin is warm and dry.      Capillary Refill: Capillary refill takes less than 2 seconds.   Neurological:      General: No focal deficit present.      Mental Status: She is alert.   Psychiatric:         Mood and Affect: Mood normal.                Labs/Imaging/Cardiac Studies   I have personally reviewed the patient's available lab work, primary care appointment notes, pertinent imaging studies, and cardiac studies and have discussed them and my independent interpretation of those results with the patient and caregiver at this appointment.  All pertinent recent Emergency Department evaluations and Hospital admissions were also reviewed in detail with the patient and caregiver.    Reviewed Holter  Echo:  Transthoracic Echo (TTE) Limited  Result Date: 2/21/2025   [facility], [address] [place] [address]              Tel [phone] and Fax [phone] TRANSTHORACIC ECHOCARDIOGRAM REPORT  Patient Name:       MANE Holm Physician:   [id number] Clifford Mooney MD Study Date:         2/21/2025           Ordering Provider:   [id number] MARY JO BURRIS MRN/PID:            [id number]            Fellow: Accession#:         [id number]        Nurse: Date of Birth/Age:   1950 / 74     Sonographer:         Rosa Peraza RDCS                     years Gender assigned at  F                   Additional Staff: Birth: Height:             167.64 cm           Admit Date:          2/21/2025 Weight:             69.40 kg            Admission Status:    Observation - STAT BSA / BMI:          1.78 m2 / 24.70     Encounter#:          [id number]                     kg/m2 Blood Pressure:     141/68 mmHg         Department Location: [facility]                                                              Invasive Study Type:    TRANSTHORACIC ECHO (TTE) LIMITED Diagnosis/ICD: Unstable angina-I20.0 Indication:    Unstable Angina CPT Code:      Echo Limited-98889 Patient History: CABG:              CABG x 1 on 2/12/2025. Pertinent History: CAD, Chest Pain, HTN, Palpitations, Hyperlipidemia and                    Dyspnea. Study Detail: The following Echo studies were performed: M-Mode, 2D, Doppler and               color flow.  PHYSICIAN INTERPRETATION: Left Ventricle: Left ventricular ejection fraction is normal, calculated by Buckley's biplane at 64%. There are no regional left ventricular wall motion abnormalities. The left ventricular cavity size is normal. There is normal septal and normal posterior left ventricular wall thickness. Left ventricular diastolic filling is indeterminate. Left Atrium: The left atrial size was not assessed. Right Ventricle: The right ventricle was not assessed. There is normal right ventricular global systolic function. Right Atrium: The right atrial size was not assessed. Aortic Valve: The aortic valve is trileaflet. There is no evidence of aortic valve regurgitation. Mitral Valve: The mitral valve is normal in structure. There is mild mitral valve regurgitation. Tricuspid Valve: The tricuspid valve is structurally normal. There is mild tricuspid regurgitation. The Doppler estimated RVSP is within normal limits at 27.2 mmHg. Pulmonic Valve: The pulmonic valve is  structurally normal. There is no indication of pulmonic valve regurgitation. Pericardium: There is no pericardial effusion noted. Aorta: The aortic root was not assessed. Systemic Veins: The inferior vena cava appears normal in size. In comparison to the previous echocardiogram(s): Compared with study dated 1/15/2025, no significant change.  CONCLUSIONS:  1. Left ventricular ejection fraction is normal, calculated by Buckley's biplane at 64%.  2. Left ventricular diastolic filling is indeterminate.  3. There is normal right ventricular global systolic function.  4. Right ventricular systolic pressure is within normal limits. QUANTITATIVE DATA SUMMARY:  2D MEASUREMENTS:          Normal Ranges: IVSd:            0.84 cm  (0.6-1.1cm) LVPWd:           0.82 cm  (0.6-1.1cm) LVIDd:           5.22 cm  (3.9-5.9cm) LVIDs:           3.61 cm LV Mass Index:   86 g/m2 LVEDV Index:     37 ml/m2 LV % FS          30.8 %  LV SYSTOLIC FUNCTION:                      Normal Ranges: EF-A4C View:    66 % (>=55%) EF-A2C View:    61 % EF-Biplane:     64 % LV EF Reported: 64 %  LV DIASTOLIC FUNCTION:             Normal Ranges: MV Peak E:             0.56 m/s    (0.7-1.2 m/s) MV Peak A:             0.74 m/s    (0.42-0.7 m/s) E/A Ratio:             0.76        (1.0-2.2) MV e'                  0.069 m/s   (>8.0) MV lateral e'          0.08 m/s MV medial e'           0.06 m/s E/e' Ratio:            8.12        (<8.0) a'                     0.09 m/s PulmV Sys Julio:         38.60 cm/s PulmV Mejía Julio:        32.10 cm/s PulmV S/D Julio:         1.20 PulmV A Revs Julio:      20.60 cm/s PulmV A Revs Dur:      127.00 msec  MITRAL VALVE:          Normal Ranges: MV DT:        367 msec (150-240msec)  RIGHT VENTRICLE: TAPSE: 16.5 mm RV s'  0.10 m/s  TRICUSPID VALVE/RVSP:          Normal Ranges: Peak TR Velocity:     2.46 m/s Est. RA Pressure:     3 mmHg RV Syst Pressure:     27 mmHg  (< 30mmHg) IVC Diam:             1.20 cm  PULMONIC VALVE:          Normal  Ranges: PV Accel Time:  158 msec (>120ms)  PULMONARY VEINS: PulmV A Revs Dur: 127.00 msec PulmV A Revs Julio: 20.60 cm/s PulmV Mejía Julio:   32.10 cm/s PulmV S/D Julio:    1.20 PulmV Sys Julio:    38.60 cm/s  [id number] Clifford Mooney MD Electronically signed on 2/21/2025 at 11:05:09 AM  ** Final **     Transthoracic Echo (TTE) Complete  Result Date: 1/15/2025        [facility] [place], [place] [address] [employer] [place] [address]        Tel [phone] and Fax [phone] TRANSTHORACIC ECHOCARDIOGRAM REPORT  Patient Name:       MANE Holm Physician:    [id number] Turner MD Study Date:         1/15/2025           Ordering Provider:    Jean Carlos TURNER MRN/PID:            [id number]            Fellow: Accession#:         [id number]        Nurse: Date of Birth/Age:  1950 / 74     Sonographer:          Lou uribe                                     KATHY Gender assigned at  F                   Additional Staff: Birth: Height:             167.64 cm           Admit Date:           1/15/2025 Weight:             72.57 kg            Admission Status:     Outpatient BSA / BMI:          1.82 m2 / 25.82     Encounter#:           [id number]                     kg/m2 Blood Pressure:     144/66 mmHg         Department Location:  [facility] Study Type:    TRANSTHORACIC ECHO (TTE) COMPLETE Diagnosis/ICD: Angina pectoris, unspecified-I20.9 Indication:    Angina, Recent Onset CPT Code:      Echo Complete w Full Doppler-[id number]  Study Detail: The following Echo studies were performed: 2D, M-Mode, Doppler and               color flow. Patient's heart rhythm is normal sinus rhythm. A               bubble study was not performed.  PHYSICIAN INTERPRETATION: Left Ventricle: The left ventricular  systolic function is normal, with a Buckley's biplane calculated ejection fraction of 66%. There are no regional left ventricular wall motion abnormalities. The left ventricular cavity size is normal. There is mildly increased septal thickness. Spectral Doppler shows a Grade I (impaired relaxation pattern) of left ventricular diastolic filling with normal left atrial filling pressure. Left Atrium: The left atrium is normal in size. A bubble study using agitated saline was not performed. Right Ventricle: The right ventricle is normal in size. There is normal right ventricular global systolic function. Right Atrium: The right atrium is normal in size. Aortic Valve: The aortic valve is trileaflet. There is evidence of mildly elevated transaortic gradients consistent with sclerosis of the aortic valve. There is trace aortic valve regurgitation. The peak instantaneous gradient of the aortic valve is 9 mmHg. Mitral Valve: The mitral valve is mildly thickened. There is mild mitral valve regurgitation. Tricuspid Valve: The tricuspid valve is structurally normal. There is trace to mild tricuspid regurgitation. Pulmonic Valve: The pulmonic valve is structurally normal. There is physiologic pulmonic valve regurgitation. Pericardium: There is no pericardial effusion noted. Aorta: The aortic root is normal. In comparison to the previous echocardiogram(s): Compared with study dated 4/2/2024, no significant change.  CONCLUSIONS:  1. The left ventricular systolic function is normal, with a Buckley's biplane calculated ejection fraction of 66%.  2. Spectral Doppler shows a Grade I (impaired relaxation pattern) of left ventricular diastolic filling with normal left atrial filling pressure.  3. There is normal right ventricular global systolic function.  4. Aortic valve sclerosis. QUANTITATIVE DATA SUMMARY:  2D MEASUREMENTS:          Normal Ranges: LAs:             4.03 cm  (2.7-4.0cm) RVIDd:           2.27 cm  (0.9-3.6cm) IVSd:             1.03 cm  (0.6-1.1cm) LVPWd:           0.95 cm  (0.6-1.1cm) LVIDd:           4.72 cm  (3.9-5.9cm) LVIDs:           2.95 cm LV Mass Index:   90 g/m2 LVEDV Index:     35 ml/m2 LV % FS          37.5 %  LA VOLUME:                    Normal Ranges: LA Vol A4C:        38.8 ml    (22+/-6mL/m2) LA Vol A2C:        58.8 ml LA Vol BP:         48.7 ml LA Vol Index A4C:  21.3 ml/m2 LA Vol Index A2C:  32.3 ml/m2 LA Vol Index BP:   26.8 ml/m2 LA Area A4C:       15.4 cm2 LA Area A2C:       18.6 cm2 LA Major Axis A4C: 5.2 cm LA Major Axis A2C: 5.0 cm LA Vol A4C:        36.6 ml LA Vol A2C:        55.6 ml LA Vol Index BSA:  25.3 ml/m2  RA VOLUME BY A/L METHOD:            Normal Ranges: RA Vol A4C:              38.1 ml    (8.3-19.5ml) RA Vol Index A4C:        20.9 ml/m2 RA Area A4C:             14.2 cm2 RA Major Axis A4C:       4.5 cm  LV SYSTOLIC FUNCTION BY 2D PLANIMETRY (MOD):                      Normal Ranges: EF-A4C View:    66 % (>=55%) EF-A2C View:    66 % EF-Biplane:     66 % LV EF Reported: 66 %  LV DIASTOLIC FUNCTION:             Normal Ranges: MV Peak E:             0.61 m/s    (0.7-1.2 m/s) MV Peak A:             0.83 m/s    (0.42-0.7 m/s) E/A Ratio:             0.73        (1.0-2.2) MV e'                  0.063 m/s   (>8.0) MV lateral e'          0.07 m/s MV medial e'           0.05 m/s MV A Dur:              106.57 msec E/e' Ratio:            9.61        (<8.0)  MITRAL VALVE:          Normal Ranges: MV DT:        289 msec (150-240msec)  AORTIC VALVE:           Normal Ranges: AoV Vmax:      1.48 m/s (<=1.7m/s) AoV Peak P.8 mmHg (<20mmHg) LVOT Max Julio:  0.96 m/s (<=1.1m/s) LVOT VTI:      22.74 cm LVOT Diameter: 2.03 cm  (1.8-2.4cm) AoV Area,Vmax: 2.08 cm2 (2.5-4.5cm2)  RIGHT VENTRICLE: RV Basal 3.40 cm RV Major 6.6 cm TAPSE:   25.1 mm RV s'    0.13 m/s  TRICUSPID VALVE/RVSP:          Normal Ranges: Peak TR Velocity:     2.61 m/s Est. RA Pressure:     3 mmHg RV Syst Pressure:     30 mmHg  (< 30mmHg)  PULMONIC  VALVE:          Normal Ranges: PV Accel Time:  129 msec (>120ms) PV Max Julio:     0.9 m/s  (0.6-0.9m/s) PV Max PG:      3.2 mmHg  AORTA: Asc Ao Diam 3.05 cm  [id number] Wood Turner MD Electronically signed on 1/15/2025 at 1:01:36 PM  ** Final **          Assessment and Recommendations     Assessment/Plan       1. Hypercholesteremia (Primary)  The patient's lipids are well controlled on chronic rosuvastatin therapy and they are meeting their goal LDL cholesterol per the ACC/AHA guidelines.        2. Coronary artery disease involving coronary bypass graft of native heart without angina pectoris  The patient's CAD, as detailed in the HPI, has been clinically stable, without any anginal symptoms or dyspnea.  The patient will continue treatment with guideline-directed medical therapy with ASA/Plavix and statin medications and was advised regular exercise and a heart healthy diet.  No ischemia on stress MPI.  Moderate LCX disease (small vessel) treat medically. Start cardiac rehab.   DAPT for 1 year with ASA/Plavix.      3. PAC's  Symptomatic.  Resume Cardizem and will see EP end of July.    4. PSVT  Symptomatic.  Non-sustained PSVT.  On Cardizem.  Follow with EP.    Tracy Santoyo will return in 6 months for an office visit.        Tracy Santoyo will return in 3 months for an office visit.         Wood Turner MD    Exclusive of any other services or procedures performed, I, Wood Turner MD , spent 30 minutes in duration for this visit today.  This time consisted of chart review, obtaining history, and/or performing the exam as documented above as well as documenting the clinical information for the encounter in the electronic record, discussing treatment options, plans, and/or goals with patient, family, and/or caregiver, refilling medications, updating the electronic record, ordering medicines, lab work, imaging, referrals, and/or procedures as documented above and communicating with other [facility] professionals. I have  discussed the results of laboratory, radiology, and cardiology studies with the patient and their family/caregiver.

## 2025-07-15 ENCOUNTER — APPOINTMENT (OUTPATIENT)
Dept: CARDIAC REHAB | Facility: CLINIC | Age: 75
End: 2025-07-15
Payer: MEDICARE

## 2025-07-17 ENCOUNTER — CLINICAL SUPPORT (OUTPATIENT)
Dept: CARDIAC REHAB | Facility: CLINIC | Age: 75
End: 2025-07-17
Payer: MEDICARE

## 2025-07-17 DIAGNOSIS — I25.810 CORONARY ARTERY DISEASE INVOLVING CORONARY BYPASS GRAFT OF NATIVE HEART WITHOUT ANGINA PECTORIS: ICD-10-CM

## 2025-07-17 PROCEDURE — 93798 PHYS/QHP OP CAR RHAB W/ECG: CPT | Performed by: INTERNAL MEDICINE

## 2025-07-22 ENCOUNTER — CLINICAL SUPPORT (OUTPATIENT)
Dept: CARDIAC REHAB | Facility: CLINIC | Age: 75
End: 2025-07-22
Payer: MEDICARE

## 2025-07-22 DIAGNOSIS — I25.810 CORONARY ARTERY DISEASE INVOLVING CORONARY BYPASS GRAFT OF NATIVE HEART WITHOUT ANGINA PECTORIS: ICD-10-CM

## 2025-07-22 PROCEDURE — 93798 PHYS/QHP OP CAR RHAB W/ECG: CPT | Performed by: INTERNAL MEDICINE

## 2025-07-24 ENCOUNTER — CLINICAL SUPPORT (OUTPATIENT)
Dept: CARDIAC REHAB | Facility: CLINIC | Age: 75
End: 2025-07-24
Payer: MEDICARE

## 2025-07-24 DIAGNOSIS — I25.810 CORONARY ARTERY DISEASE INVOLVING CORONARY BYPASS GRAFT OF NATIVE HEART WITHOUT ANGINA PECTORIS: ICD-10-CM

## 2025-07-24 PROCEDURE — 93798 PHYS/QHP OP CAR RHAB W/ECG: CPT | Performed by: INTERNAL MEDICINE

## 2025-07-24 NOTE — PATIENT INSTRUCTIONS
Cardiac Rehabilitation: Home Exercise Handout    The American College of Sports Medicine and American Heart Association recommends engaging in aerobic exercise 5 to 7 times per week, and staying within your target heart rate range for a minimum of 30 to 60 minutes per session. The following guidelines and components of your home exercise program are similar to those in your Cardiac Rehabilitation program.     The following are recommendations for your exercise routine based on the FITT principle:    Frequency:   5 to 7 days per week    Intensity:   Target Heart Rate =      RPE = 12 - 16    Time:   30+ minutes per session; 150 total minutes per week    Type:   Aerobic exercise    Specific Recommendations:       Warm Up  It is important to start off slow to give your body a change to get used to the increased workload and transition from rest to exercise. Your warm up should consist of a slow paced activity for 2-5 minutes.    Example:  2 - 5 minutes of walking or marching in place     Aerobic Exercises   (Cardio)   If you have home exercise equipment, this is where you can use it! If not, there are still lots of ways to be active at home or outside. The minimum time for aerobic exercise is 10 minutes, building up to 30 - 60 minutes.    Examples: walking, cycling, or dancing     Resistance Training   (Strength, Free Weights, Resistance Bands, Body)   Body weight or resistance exercises can be done at minimum 2 times per week and are important to build up your physical strength and endurance. Your resistance routine should include 6 to 10 different exercises that can be done 10-15 times (repetitions) repeating 1-2 sets. Spend 15 - 20 minutes on these exercises.     Examples: See suggestions from resistance training handout or ask staff       Cool Down   It is important to allow your heart rate and blood pressure to gradually recover from the exercise training phase. The cool-down period should be about 5  "minutes in total and be incorporated with flexibility or stretching exercises.    Examples: See suggestions from stretching handout      If you're not using a target heart rate, you can ask yourself \"How difficult is the work I am doing?\" By using the RPE scale attached, work should not be too light or too hard. Aim to work at the 12 - 15 level on this scale. You can also check in on your shortness of breath using the RPD scale, where you should be at or below a 3 - Moderate shortness of breath while exercising.                        If you have any additional questions about your home exercise plan, feel free to contact your  at rehabilitation.     Rehab Staff Signed: Desire Flaherty RN   " (4) no impairment

## 2025-07-24 NOTE — PROGRESS NOTES
Cardiac Rehab Education  Tracy Santoyo   [phone]    7/24/2025  Stretching education was done with patient during today's rehab session. Demonstrations of seated and standing stretches were done with patient, in addition to discussing the benefits of flexibility training. Patient was provided handout with examples of exercises and further instructions to be practiced at home. Patient encouraged to report to rehab staff with any questions or concerns.      Signature Desire Flaherty RN

## 2025-07-24 NOTE — PROGRESS NOTES
Cardiac Rehab Education  Tracy Santoyo   [id number]    7/24/2025  Cardiovascular changes with exercise were discussed in today's session. Both immediate and long term effects of exercise were covered and the importance of cooling down post workout was reviewed.      Shira Flaherty RN

## 2025-07-24 NOTE — PROGRESS NOTES
"Phase II Cardiac Rehabilitation Performance Measure Report    Patient Name: Tracy Santoyo   : 1950   MRN: [id number]   : Desire Flaherty RN  Start Date: 2025  End Date: 2025  # Sessions Completed: 21     Patient's Stated Goals:   Get back to playing golf by the end of  or beginning of July.   Achieved: Yes    Go up a flight of stairs without feeling sob by discharge.   Achieved: Yes  Comments: aim for moderate intensity exercise most day of the week with 2-3 days of strength training.    Program Outcome Goals:    Tobacco Cessation  Social History[1]   Achieved: Not Applicable  Comments/Long-Term Goals:    Lipids  Lab Results   Component Value Date    CHOL 140 2024    CHOL 143 2024    HDL 58.2 2024    HDL 59.4 2024    LDLCALC 67 2024    LDLCALC 70 2024    TRIG 74 2024    TRIG 68 2024     Achieved: Yes  Comments/Long-Term Goals: Patient was advised to read labels for the grocery and food items as suggested by program dietician, continue with statin and exercise.    Physical Activity  Duration   Pre: 21 minutes   Post: 45 minutes   % Change: 114%  METS (Intensity)   Pre: 2.5   Post: 4.2   % Change: 68%    Achieved: Yes  Comments/Long-Term Goals: Good job!! Patient is advised to continue achieving 150 minutes/week or more of aerobic exercise and to progress the exercise intensity as well as duration according to the exercise handout.      Diabetes   No results found for: \"HGBA1C\"   Achieved: Not Applicable  Comments/Long-Term Goals: N/A    Nutrition:   Picture Your Plate  Pre: 72  Post:     Achieved: Yes  Comments/Long-Term Goals: Patient was advised to continue progressing towards heart healthy diet as recommended by program dietician.     Psychosocial:   PHQ-9  Pre: 3  Post: 1    Achieved: Yes  Comments/Long-Term Goals: Patient was advised to continue managing stress and using stress management skills.     Rehab Staff Signature: " Desire Flaherty RN  Date: 7/24/2025          [1]   Social History  Tobacco Use    Smoking status: Never    Smokeless tobacco: Never   Vaping Use    Vaping status: Never Used   Substance Use Topics    Alcohol use: Never    Drug use: Never

## 2025-07-24 NOTE — PROGRESS NOTES
Cardiac Rehab Education  Tracy Santoyo   [id number]    7/24/2025  Discussion of prescribed drug names, doses, side effects, and medication uses was done with patient. Education handout, Medications Used for Cardiac Conditions was also given to the patient. Patient was instructed to come back with any questions.      Signature Desire Flaherty RN

## 2025-07-28 ENCOUNTER — DOCUMENTATION (OUTPATIENT)
Dept: CARDIAC REHAB | Facility: CLINIC | Age: 75
End: 2025-07-28
Payer: MEDICARE

## 2025-07-28 NOTE — PROGRESS NOTES
Cardiac Rehabilitation Discharge Summary    Name: Tracy Santoyo  Medical Record Number: [id number]  YOB: 1950  Age: 74 y.o.    Today’s Date: 7/28/2025  Primary Care Physician: Marquez Duncan DO  Referring Physician: Wood Turner MD  Program Location: [address]     General  Primary Diagnosis:   1. Hypercholesteremia  Referral to Cardiac Rehab       2. Coronary artery disease involving coronary bypass graft of native heart without angina pectoris  Referral to Cardiac Rehab        Onset/Date of Diagnosis: 2/12/2025    Sessions # 20    AACVPR Risk Stratification:  Moderate     Falls Risk: Medium  Psychosocial Assessment     Pre PHQ-9: 3   Post PHQ-9: 1    Sent PH-Q 9 to MD if score > 20: No; score < 20    Pt reported/currently experiencing stress: No  Patient uses stress management skills: No   History of: no history of anxiety or depression  Currently seeing a mental health provider: No  Social Support: Yes, Whom:, Greyson Hallman  Quality of Life Survey: SF-36   SF-36 Pre Post   Physical Component Score     Mental Component Score       Learning Assessment:  Learning assessment/barriers: None  Preferred learning method: Auditory and Visual  Barriers: None  Comments:    Stages of Change:Action    Psychosocial Plan    Goal Status: Met    Psychosocial Goals: Maintain or lower PH-Q 9 score by discharge    Psychosocial Interventions/Education:   4/24/2025 Initial PH-Q 9 reviewed.  5/20/25 Pt reports no new stress.     6/16/2025 No change in psychosocial.  6/26/2025  Effects of stress on the body, risks of unmanaged stress and techniques to minimize anxieties were all discussed in today's session. Handout was given for reference with resources to  [facility]al [facility] Services should patient need further assistance with stress management. Patient encouraged to notify staff if stressors increase and additional help is required.      7/10/2025 Pt in good spirits    Psychosocial Discharge:  PH-Q  "9 score lowered by 2 points.  Patient was advised to continue managing stress and using stress management skills.     Nutrition Assessment:    Hyperlipidemia: Yes     Lipids:   Lab Results   Component Value Date    CHOL 140 09/27/2024    HDL 58.2 09/27/2024    LDLF 67 09/27/2024    TRIG 74 09/27/2024       Current Dietary Guidelines: Low fat, Low sodium  Barriers to dietary change: no    Diet Habit Survey: Picture Your Plate  Pre: 72  Post:     Diabetes Assessment    No results found for: \"HGBA1C\"    History of Diabetes: No    Weight Management    Height: 167.6 cm (5' 6\")  Weight: 68 kg (156 lb)  BMI (Calculated): 25.18      Nutrition Plan    Goal Status: In progress    Nutrition Goals: Lipid Goal: HDL>45, LDL <70, Total <180, Trigs <150, Improve Diet Habit Survey score by 5-10 points by discharge, Adapt a low-sodium, DASH diet prior to discharge, Adapt a Mediterranean focused diet prior to discharge, and Learn how to read and interpret nutrition labels prior to discharge    Nutrition Interventions/Education:   5/15/2025 Diet Habit Survey results reviewed with pt.  Dietitian's comments and recommendations given to Pt for review.    Cardiac/Pulmonary Rehab Nutrition Education    5/27/2025  Patient attended Heart Healthy Diet lecture with program RDN during today's exercise session. Heart Healthy diet tips sheet was given for further review at home and patient was encouraged to come back with any follow up questions.  Gave patient handout on Recipes website list.     Signature Tara Markham RDN, BETH    6/3/2025 Cardiac booklet given and lipids reviewed.     Cardiac/Pulmonary Rehab Nutrition Education    6/24/2025  Label reading education was done with program's RDN during today's visit. Label Reading Highlights handout was given with information discussed during appointment and to assist in review of label contents at home.      Signature Tara Markham RDN, LD      Nutrition Discharge: Patient was advised to " continue progressing towards heart healthy diet as recommended by program dietician.     Exercise Assessment    Home Exercise: yes  Mode: TM  Frequency: 5-7 days a week  Duration: 30 minutes    Exercise Prescription     Exercise Prescription based on: Duke Activity Status Index (DASI)    DASI Score: 18.95   MET Score: 5.1   Frequency:  2 days/week   Mode: Treadmill, NuStep, and Recumbent Cycle   Duration: 45 total aerobic minutes   Intensity: RPE 12-16  Target HR:    MET Level: 4.2  Patient wears supplemental O2: No     Modality Workload METs Duration (minutes)   1 Pre-Exercise      2 Treadmill 2.5 mph @ 2 % 3.6 15 :00   3 NuStep  68 [place]  @ Load 8 3.1 15 :00   4 Recumbent Bike 7 Load @ 60 rpms  4.2 15 :00   5 Co[place]own    5 :00   6 Post-Exercise        Resistance Training: Pt reports that she does 3 lbs weight training and band exercise at home.  Home Exercise Prescription given: Yes    Exercise Plan    Goal Status: Met  Exercise Goals: Increase total exercise duration to 30-45 minutes and Establish a home exercise program before discharge    Exercise Interventions/Education:   5/22/2025 Resistance training education was done. Resistance bands were measured and cut for patient to begin home strength training exercises. Frequency, intensity/RPE, and form were reviewed with educational handout given for further reference. Patient was encouraged to comeback with any questions.     6/10/2025  Exercise prescription and maintenance education was done during today's session. Discussed FITT principle, reviewed perceived exertion scale, and patient's THR was given for home practice with instructions on how to obtain. Handouts were given for personal reference. Home exercise prescription is to be given prior to discharge from program.      7/10/2025 Increased exercise duration and intensity on TM/NS/RB based on pt's HR/BP/RPE response to exercise.    7/24/2025  Cardiovascular changes with exercise were discussed in  today's session. Both immediate and long term effects of exercise were covered and the importance of cooling down post workout was reviewed.      7/24/2025  Stretching education was done with patient during today's rehab session. Demonstrations of seated and standing stretches were done with patient, in addition to discussing the benefits of flexibility training. Patient was provided handout with examples of exercises and further instructions to be practiced at home. Patient encouraged to report to rehab staff with any questions or concerns.       Exercise Discharge: Patient is advised to continue achieving 150 minutes/week or more of aerobic exercise and to progress the exercise intensity as well as duration according to the exercise handout.      Other Core Components/Risk Factor Assessment:    Medication adherence  Current Medications:   Medication Documentation Review Audit       Reviewed by Wood Turner MD (Physician) on 07/14/25 at 1311      Medication Order Taking? Sig Documenting Provider Last Dose Status   acetaminophen (Tylenol) 325 mg tablet [id number] Yes Take 2 tablets (650 mg) by mouth every 6 hours if needed for mild pain (1 - 3) (surgical pain). DARA Reed  Active   amLODIPine (Norvasc) 5 mg tablet [id number] Yes Take 1 tablet (5 mg) by mouth once daily. DARA Elder  Active   aspirin 81 mg chewable tablet [id number] Yes Chew 1 tablet (81 mg) once daily. DARA Reed  Active   carvedilol (Coreg) 6.25 mg tablet [id number]  Take 1 tablet (6.25 mg) by mouth 2 times a day.   Patient not taking: Reported on 7/14/2025    DARA Elder  Active   clopidogrel (Plavix) 75 mg tablet [id number] Yes Take 1 tablet (75 mg) by mouth once daily. DARA Elder  Active   estradiol (Estrace) 0.01 % (0.1 mg/gram) vaginal cream [id number] Yes Insert into the vagina 3 times a week. Historical Provider, MD  Active   levothyroxine (Synthroid, Levoxyl) 125 mcg  tablet [id number] Yes Take 1 tablet (125 mcg) by mouth once daily. Historical Provider, MD  Active   lidocaine (Lidoderm) 5 % patch [id number]  Place 1 patch over 12 hours on the skin once daily. Remove & discard patch within 12 hours.   Patient not taking: Reported on 7/14/2025    DARA Elder  Active   nitroglycerin (Nitrostat) 0.3 mg SL tablet [id number] Yes Place 1 tablet (0.3 mg) under the tongue every 5 minutes if needed for chest pain (up to 3). DARA Sparks  Active   polyethylene glycol (Glycolax, Miralax) 17 gram packet [id number]  Take 17 g by mouth once daily as needed (constipation).   Patient not taking: Reported on 7/14/2025    DARA Reed  Active   rosuvastatin (Crestor) 20 mg tablet [id number] Yes Take 1 tablet (20 mg) by mouth once daily. Wood Turner MD  Active                                 Medication compliance: No, Pt stopped taking Coreg due to low BP.  Pt was advised to let Dr. Turner know.   Uses pill box/organizer: No    Carries medication list: Yes     Blood Pressure Management  History of Hypertension: Yes   Medication Changes: No   Resting BP:  pre-ex. 134/60; post-ex 90/60     Heart Failure Management  Hx of Heart Failure: No    Smoking/Tobacco Assessment  Tobacco Use History[1]    Other Core Component Plan    Goal Status: Met    Other Core Component Goals: Medication compliance, Verbalize medication usage and drug actions by discharge, Verbalize SL NTG action and proper dosage by discharge, and Achieve resting BP of < 130/80 by discharge    Other Core Component Interventions/Education:   5/15/2025  Education on sodium intake and consequences of high blood pressure was done during today's session. Discussed with patient the risks of excess levels of sodium and how to decrease dietary intake with provided list of substitutions. Handouts were given for home reference.      6/17/2025 Coronary artery disease education was done during today's session.  Signs and symptoms of possible heart attack, risk factors for prevention, and lifestyle behavior modifications were all explained. Handout for personal use was given.      7/10/2025 Risk factors of cardiovascular disease were discussed today's. Education on modifiable and non-modifiable risk factors of CVD and recommendations to manage them was done today. Handouts were provided along with further reference. Patient was educated to comeback with further questions.     7/24/2025  Discussion of prescribed drug names, doses, side effects, and medication uses was done with patient. Education handout, Medications Used for Cardiac Conditions was also given to the patient. Patient was instructed to come back with any questions.        Individual Patient Goals:    Get back to playing golf by the end of June or beginning of July.  Go up a flight of stairs without feeling sob by discharge.    Goal Status: Met    Staff Comments:  Ms. Santoyo has completed her Cardiac Rehab at 20 sessions due to copay and taking care of her .  She increased her exercise duration by 114% and exercise intensity/METS by 68%.  She plans on continuing her exercise routine at home on her TM.  She has met her personal goals.  She was educated on risk factor modification, including diet, exercise, stress management and medication adherence.  She voiced no cardiac complaints.  Arrhythmia was noted on the monitor and Dr. Turner was made aware.  Pt completed Holter monitoring as ordered.  Pt to follow up with EP.     Rehab Staff Signature: Desire Flaherty RN             [1]   Social History  Tobacco Use   Smoking Status Never   Smokeless Tobacco Never

## 2025-07-29 ENCOUNTER — APPOINTMENT (OUTPATIENT)
Dept: CARDIAC REHAB | Facility: CLINIC | Age: 75
End: 2025-07-29
Payer: MEDICARE

## 2025-07-30 ENCOUNTER — APPOINTMENT (OUTPATIENT)
Dept: CARDIOLOGY | Facility: CLINIC | Age: 75
End: 2025-07-30
Payer: MEDICARE

## 2025-07-30 VITALS
HEIGHT: 66 IN | HEART RATE: 69 BPM | BODY MASS INDEX: 25.29 KG/M2 | SYSTOLIC BLOOD PRESSURE: 129 MMHG | WEIGHT: 157.38 LBS | DIASTOLIC BLOOD PRESSURE: 70 MMHG | OXYGEN SATURATION: 97 %

## 2025-07-30 DIAGNOSIS — I49.3 PREMATURE VENTRICULAR CONTRACTIONS: Primary | ICD-10-CM

## 2025-07-30 DIAGNOSIS — I49.1 PAC (PREMATURE ATRIAL CONTRACTION): Primary | ICD-10-CM

## 2025-07-30 PROCEDURE — 3078F DIAST BP <80 MM HG: CPT | Performed by: INTERNAL MEDICINE

## 2025-07-30 PROCEDURE — 99212 OFFICE O/P EST SF 10 MIN: CPT

## 2025-07-30 PROCEDURE — 1159F MED LIST DOCD IN RCRD: CPT | Performed by: INTERNAL MEDICINE

## 2025-07-30 PROCEDURE — 99214 OFFICE O/P EST MOD 30 MIN: CPT | Performed by: INTERNAL MEDICINE

## 2025-07-30 PROCEDURE — 3074F SYST BP LT 130 MM HG: CPT | Performed by: INTERNAL MEDICINE

## 2025-07-30 PROCEDURE — 93005 ELECTROCARDIOGRAM TRACING: CPT | Performed by: INTERNAL MEDICINE

## 2025-07-30 RX ORDER — DILTIAZEM HYDROCHLORIDE 240 MG/1
240 CAPSULE, COATED, EXTENDED RELEASE ORAL DAILY
Qty: 90 CAPSULE | Refills: 3 | Status: SHIPPED | OUTPATIENT
Start: 2025-07-30

## 2025-07-30 RX ORDER — NITROFURANTOIN 25; 75 MG/1; MG/1
100 CAPSULE ORAL
COMMUNITY

## 2025-07-30 ASSESSMENT — ENCOUNTER SYMPTOMS
OCCASIONAL FEELINGS OF UNSTEADINESS: 0
LOSS OF SENSATION IN FEET: 0

## 2025-07-30 NOTE — PROGRESS NOTES
Returns for follow up visit for    Chief Complaint   Patient presents with    Follow-up     PVC's       PACs.  History Of Present Illness:    Tracy Santoyo is a 74 y.o. year old female patient     She had an eventful year. She developed angina and orthopnea and cardiac catheterization showed significant LAD stenosis. She then underwent .  Robotic assisted mini invasive off-pump CABG with pedicle MIRANDA to LAD by Edgar Schmitt. Successful and she recently completed cardiac rehab.   She then developed some symptomatic PACs no atrial fibrillation. She had monitor which showed PACs 7% and some shorter episodes of PAT. The diltiazem was restarted and this was successful. She feels better.     Past Medical History:  Medical History[1].     Past Surgical History:  Surgical History[2]       Social History:  Caffeine:  none  Cigarettes: none  ETOH: none   Drugs: none  Exercise: recumbant bike 3 days a week.   Occ: retired   Marital status: M    Family History:  Family History[3]      Allergies:  RX Allergies[4]     Outpatient Medications:  Current Outpatient Medications   Medication Instructions    acetaminophen (TYLENOL) 650 mg, oral, Every 6 hours PRN    aspirin 81 mg, oral, Daily    clopidogrel (PLAVIX) 75 mg, oral, Daily    dilTIAZem CD (CARDIZEM CD) 240 mg, Daily    estradiol (Estrace) 0.01 % (0.1 mg/gram) vaginal cream 3 times weekly    levothyroxine (Synthroid, Levoxyl) 125 mcg tablet 1 tablet, Daily RT    nitroglycerin (NITROSTAT) 0.3 mg, sublingual, Every 5 min PRN    rosuvastatin (CRESTOR) 20 mg, oral, Daily         Last Recorded Vitals:      3/5/2025     9:05 AM 3/13/2025     3:34 PM 3/17/2025     2:15 PM 4/10/2025     1:20 PM 4/24/2025    12:42 PM 7/14/2025     1:06 PM 7/30/2025     4:00 PM   Vitals   Systolic 107 114 120 120  138 129   Diastolic 57 60 72 72  69 70   BP Location  Right arm Left arm Right arm  Right arm Left arm   Heart Rate 65 70 65 62  81 69   Temp 36.6 °C (97.9 °F) 36.3 °C (97.3 °F)       "  Resp  16        Height 1.676 m (5' 6\")  1.676 m (5' 6\") 1.676 m (5' 6\") 1.676 m (5' 6\")  1.676 m (5' 6\")   Weight (lb) 152  150 151 150 156 157.38   BMI 24.53 kg/m2  24.21 kg/m2 24.37 kg/m2 24.21 kg/m2 25.18 kg/m2 25.4 kg/m2   BSA (m2) 1.79 m2  1.78 m2 1.79 m2 1.78 m2 1.82 m2 1.82 m2   Visit Report Report  Report Report  Report Report        Physical Exam:  Physical Exam  Constitutional:       Appearance: Normal appearance.   HENT:      Head: Normocephalic.      Nose: Nose normal.   Neck:      Vascular: No carotid bruit.     Cardiovascular:      Rate and Rhythm: Normal rate and regular rhythm.      Pulses: Normal pulses.      Heart sounds: Normal heart sounds.   Pulmonary:      Effort: Pulmonary effort is normal.      Breath sounds: Normal breath sounds. No rales.     Musculoskeletal:         General: Normal range of motion.      Right lower leg: No edema.      Left lower leg: No edema.     Skin:     General: Skin is warm and dry.     Neurological:      General: No focal deficit present.      Mental Status: She is alert and oriented to person, place, and time.     Psychiatric:         Mood and Affect: Mood normal.         Behavior: Behavior normal.          Last Cardiology Tests:  ECG:  Encounter Date: 06/18/25   ECG 12 lead (Clinic Performed)   Result Value    Ventricular Rate 94    Atrial Rate 94    TN Interval 174    QRS Duration 70    QT Interval 370    QTC Calculation(Bazett) 462    P Axis 37    R Axis 33    T Axis -10    QRS Count 16    Q Onset 226    P Onset 139    P Offset 186    T Offset 411    QTC Fredericia 429    Narrative    Normal sinus rhythm  Low voltage QRS  Nonspecific T wave abnormality  Abnormal ECG  When compared with ECG of 03-MAR-2025 07:22,  QT has lengthened            Cath:  1/24/2025       Stress Test:  ECG-gated images demonstrate normal LV size and myocardial  contractility with an LV ejection fraction of   67 % (normal above 45  percent).      IMPRESSION:  1. Normal stress myocardial " perfusion imaging in response to  pharmacologic stress.  2. Well-maintained left ventricular function.  3. The ECG stress test is reported separately.      Cardiac Imagin/10/2025   FINDINGS:  Stress and rest images both demonstrate a normal distribution of  perfusion throughout all LV segments with no sign of ischemia.        Lab review: I have Chemistry CMP:   Lab Results   Component Value Date    ALBUMIN 4.3 2025    ALBUMIN 4.2 2025    CALCIUM 9.4 2025    CALCIUM 9.5 2025    CO2 26 2025    CO2 26 2025    CREATININE 0.80 2025    CREATININE 0.71 2025    GLUCOSE 91 2025    GLUCOSE 72 2025    BILITOT 0.8 2025    PROT 7.5 2025    ALT 17 2025    AST 15 2025    ALKPHOS 62 2025   , Chemistry BMP   Lab Results   Component Value Date    GLUCOSE 91 2025    GLUCOSE 72 2025    CALCIUM 9.4 2025    CALCIUM 9.5 2025    CO2 26 2025    CO2 26 2025    CREATININE 0.80 2025    CREATININE 0.71 2025   , and CBC:  Lab Results   Component Value Date    WBC 8.1 2025    WBC 8.7 2025    RBC 4.33 2025    RBC 4.20 2025    HGB 13.0 2025    HGB 12.7 2025    HCT 39.5 2025    HCT 38.9 2025    MCV 91 2025    MCV 92.6 2025    MCH 30.0 2025    MCH 30.2 2025    MCHC 32.9 2025    MCHC 32.6 2025    RDW 14.7 (H) 2025    RDW 12.9 2025    MPV 12.5 2025    NRBC 0.0 2025       Assessment/Plan   Problem List Items Addressed This Visit    None  Visit Diagnoses         Codes      Premature ventricular contractions    -  Primary I49.3          Recent cardiac monitor showed PACs 7% with short PAT - she was symptomatic and resumed diltazem with good suppression.  We reviewed that her monitor showed increased burden during the day consistent with adrenergic trigger. She was given betablockers in the past but was  intolerant. EF normal after CABG so she can stay diltiazem and RTC in 1 year.   Silvia Can MD       [1]   Past Medical History:  Diagnosis Date    Agatston coronary artery calcium score between 200 and 399 02/12/2024    IKN=096 (3/21/23)    Angina pectoris     treated with anti-anginal medical therapy    Chronic sinusitis     Coronary artery disease involving coronary bypass graft of native heart without angina pectoris 04/10/2025    Coronary artery disease involving native coronary artery of native heart without angina pectoris 04/02/2024    LHC (3/15/24) 70% mid LAD, 70% OM, 80% D1    JOVEL (dyspnea on exertion) 01/15/2025    Encounter for fitting and adjustment of other specified devices 08/23/2022    Fitting and adjustment of pessary    Encounter for fitting and adjustment of other specified devices 12/02/2019    Fitting and adjustment of pessary    Essential hypertension 04/02/2024    Hypercholesteremia 04/02/2024    Hypertension     Hypothyroidism     Other chest pain 02/12/2024    PAC (premature atrial contraction) 01/15/2025    Presence of urogenital implants 09/06/2022    Vaginal pessary present    PSVT (paroxysmal supraventricular tachycardia) 07/14/2025 6/24/25 Holter frequent non-sustained PSVT       Trigeminal neuralgia    [2]   Past Surgical History:  Procedure Laterality Date    APPENDECTOMY  1950    BLADDER SUSPENSION  1994    CARDIAC CATHETERIZATION N/A 03/15/2024    Procedure: Left Heart Cath with Coronary Angiography and LV;  Surgeon: Wood Turner MD;  Location: [facility] Cardiac Cath Lab;  Service: Cardiovascular;  Laterality: N/A;    CARDIAC CATHETERIZATION N/A 01/24/2025    Procedure: Left Heart Cath with Coronary Angiography and LV;  Surgeon: Wood Turner MD;  Location: [facility] Cardiac Cath Lab;  Service: Cardiovascular;  Laterality: N/A;    CHOLECYSTECTOMY  2010    COLONOSCOPY      CRANIOTOMY  2011    craniotomy with trigeminal nerve decompression    HYSTERECTOMY  1990    LIVER SURGERY  2010     resection of liver hemangioma    OTHER SURGICAL HISTORY  01/15/2019    Abdominoplasty    OTHER SURGICAL HISTORY  01/15/2019    Urethropexy    OTHER SURGICAL HISTORY  10/18/2022    Sacrospinous fixation of vaginal vault    SINUS SURGERY  2012   [3] No family history on file.  [4]   Allergies  Allergen Reactions    Carbamazepine Other and Unknown     Myalgia- generalized weakness    Adhesive Tape-Silicones Hives, Itching and Unknown    Dilaudid [Hydromorphone] GI Upset    Metoprolol Tartrate Insomnia     Pt reporting history of night terrors

## 2025-07-30 NOTE — PATIENT INSTRUCTIONS
It was nice to see you today!  We discussed that you can continue the diltiazem for PAC suppression since your cardiac function is normal.    We will plan to repeat the cardiac monitor next year or sooner if symptoms.  Keep up your active and healthy lifestyle.   Return in one year. Call if any issues.

## 2025-07-31 ENCOUNTER — APPOINTMENT (OUTPATIENT)
Dept: CARDIAC REHAB | Facility: CLINIC | Age: 75
End: 2025-07-31
Payer: MEDICARE

## 2025-10-06 ENCOUNTER — APPOINTMENT (OUTPATIENT)
Dept: CARDIOLOGY | Facility: CLINIC | Age: 75
End: 2025-10-06
Payer: MEDICARE

## (undated) DEVICE — ARM PROTECTORS, FOAM

## (undated) DEVICE — LUBRICANT, ELECTROLUBE, F/ELECTRODE TIPS

## (undated) DEVICE — GEL, ULTRASOUND, AQUASONIC 100, 20 GM, STERILE

## (undated) DEVICE — KNIFE, OPHTHALMIC, SLIT, 22.5 DEG

## (undated) DEVICE — CATHETER, DIAGNOSTIC, 4 FR-JR 4

## (undated) DEVICE — ELECTRODE, ELECTROSURGICAL, BLADE, INSULATED, ENT/IMA, STERILE

## (undated) DEVICE — BAG, DECANTER

## (undated) DEVICE — FORCEPS, CADIERE, DAVINCI XI

## (undated) DEVICE — CLIP, SPRING, BULLDOG, FOGARTY, SOFT JAW, 6 MM, LF

## (undated) DEVICE — CONNECTOR, STRAIGHT, 0.375 X 0.375 IN

## (undated) DEVICE — CLIP, LIGATING, W/ADHESIVE PAD, MEDIUM, TITANIUM

## (undated) DEVICE — SYRINGE, LUER LOCK, 12ML

## (undated) DEVICE — GUIDEWIRE, INQWIRE, 3MM J, .035 X 210CM, FIXED

## (undated) DEVICE — MICROCOAGULATION TEST, ACT+ TEST CUVETTE

## (undated) DEVICE — SENSOR, OXYGEN, CEREBRAL, SOMASENSOR, ADULT

## (undated) DEVICE — SURGISLEEVE, WOUND PROTECTOR, SMALL 2.5-6CM

## (undated) DEVICE — SUTURE, MONOCRYL, 3-0, 18 IN, PS2, UNDYED

## (undated) DEVICE — SUTURE, PROLENE, 6-0, 30 IN, RB-2, BLUE

## (undated) DEVICE — COVER, MAYO STAND, W/PAD, 23 IN, DISPOSABLE, PLASTIC, LF, STERILE

## (undated) DEVICE — CATHETER, DRAINAGE, NASOGASTRIC, DOUBLE LUMEN, FUNNEL END, SUMP, SALEM, 18 FR, 48 IN, PVC, STERILE

## (undated) DEVICE — CONNECTOR, STRAIGHT, 0.5 X 0.5 IN

## (undated) DEVICE — DRESSING, MEPILEX, BORDER, SACRUM, 8.7 X 9.8 IN

## (undated) DEVICE — DRESSING, MEPILEX, BORDER, HEEL, 8.7 X 9.1 IN

## (undated) DEVICE — SUTURE, VICRYL, 2-0, 27 IN, CT-1, VIOLET

## (undated) DEVICE — SHUNT, INTRACORONARY, 1.75 MM, CLEARVIEW

## (undated) DEVICE — GUIDEWIRE, INQWIRE, 3MM J, .035, 260

## (undated) DEVICE — GUIDEWIRE, HI-TORQUE, VERSACORE, 260CM, FLOPPY

## (undated) DEVICE — SUTURE, PROLENE, 7-0, 30 IN, BV1, DA, BLUE

## (undated) DEVICE — CLIP, LIGATING, W/ADHESIVE, WIDE SLOT, SMALL, TITANIUM

## (undated) DEVICE — CATHETER, DIAGNOSTIC, 4 FR-JL 3.5

## (undated) DEVICE — APPLICATOR, CHLORAPREP, W/ORANGE TINT, 26ML

## (undated) DEVICE — TR BAND, RADIAL COMPRESSION, STANDARD, 24CM

## (undated) DEVICE — TIP, SUCTION, YANKAUER, FLEXIBLE

## (undated) DEVICE — DRAPE, COLUMN, DAVINCI XI

## (undated) DEVICE — KIT, BLOWER / MISTER II

## (undated) DEVICE — CARE KIT, LAPAROSCOPIC, ADVANCED

## (undated) DEVICE — SYRINGE, 20 CC, LUER LOCK, MONOJECT, W/O CAP, LF

## (undated) DEVICE — GLIDESHEATH, SLENDER 6FR, 10CM, NITINOL KIT

## (undated) DEVICE — ELECTRODE, ELECTROSURGICAL, BLADE EXT 4 INCH, INSULATED

## (undated) DEVICE — CAUTERY SPATULA, PERMANENT

## (undated) DEVICE — DRAPE, FLUID WARMER

## (undated) DEVICE — COVER, TABLE, 44 X 75 IN, DISPOSABLE, LF, STERILE

## (undated) DEVICE — DRAPE PACK, UNIVERSAL II

## (undated) DEVICE — CLIPPER, SURGICAL BLADE ASSEMBLY, GENERAL PURPOSE, SINGLE USE

## (undated) DEVICE — DEVICE KIT, COR-KNOT MICRO

## (undated) DEVICE — SYSTEM, OCTOPUS NUVO TISSUE STABILIZER

## (undated) DEVICE — GUIDEWIRE, GO2WIRE, STEERABLE, 0.035 X 260CM, FLOPPY STRAIGHT

## (undated) DEVICE — DRAPE, ARM XI

## (undated) DEVICE — COLLECTION UNIT, DRAINAGE, THORACIC, SINGLE TUBE, DRY SUCTION, ATS COMPATIBLE, OASIS 3600, LF

## (undated) DEVICE — DRAPE, SHEET, UTILITY, NON ABSORBENT, 18 X 26 IN, LF

## (undated) DEVICE — SCISSORS, MONOPOLAR, CURVED, 8MM

## (undated) DEVICE — DRAPE, INCISE, ANTIMICROBIAL, IOBAN 2, LARGE, 17 X 23 IN, DISPOSABLE, STERILE

## (undated) DEVICE — MARKER, SKIN, RULER AND LABEL PACK, CUSTOM

## (undated) DEVICE — TRAY, SURESTEP, URINE METER, 14FR, SILICONE

## (undated) DEVICE — COVER, TIP HOT SHEARS ENDOWRIST

## (undated) DEVICE — MARKER, SKIN, DUAL TIP INK W/9 LABEL AND REMOVABLE TIME OUT SLEEVE

## (undated) DEVICE — ELECTRODE, QUICK-COMBO, EDGE SYSTEM, REDI PACK

## (undated) DEVICE — SEAL, UNIVERSAL, 5-12MM

## (undated) DEVICE — GRASPER, MICRO, BIPOLAR, DAVINCI XI

## (undated) DEVICE — APPLIER, CLIP, SMALL XI

## (undated) DEVICE — COVER, TABLE, UHC

## (undated) DEVICE — CLEANER, ELECTROSURGICAL, TIP, 5 X 5 CM, LF

## (undated) DEVICE — GOWN, ASTOUND, XL

## (undated) DEVICE — DRESSING, ADHESIVE, ISLAND, TELFA, 2 X 3.75 IN, LF

## (undated) DEVICE — MANIFOLD, 4 PORT NEPTUNE STANDARD

## (undated) DEVICE — Device

## (undated) DEVICE — SUTURE, ETHIBOND, XTRA, 30 IN, 0, CT-1, GREEN

## (undated) DEVICE — KIT, COLLECTION, CARDIO

## (undated) DEVICE — COVER, CART, 45 X 27 X 48 IN, CLEAR

## (undated) DEVICE — DRAIN, CHANNEL, BLAKE, HUBLESS, ROUND, 28FR

## (undated) DEVICE — DRESSING, ISLAND, TELFA, 4 X 5 IN

## (undated) DEVICE — APPLIER, CLIP MED-LG XI

## (undated) DEVICE — TUBE SET, PNEUMOCLEAR, SMOKE EVACU, HIGH-FLOW

## (undated) DEVICE — KIT, CELL SAVER, W/COLLECTION SET, 225ML WASH SET